# Patient Record
Sex: FEMALE | Race: NATIVE HAWAIIAN OR OTHER PACIFIC ISLANDER | NOT HISPANIC OR LATINO | Employment: OTHER | URBAN - METROPOLITAN AREA
[De-identification: names, ages, dates, MRNs, and addresses within clinical notes are randomized per-mention and may not be internally consistent; named-entity substitution may affect disease eponyms.]

---

## 2017-02-10 ENCOUNTER — OFFICE VISIT (OUTPATIENT)
Dept: MEDICAL GROUP | Facility: MEDICAL CENTER | Age: 57
End: 2017-02-10
Payer: COMMERCIAL

## 2017-02-10 VITALS
HEIGHT: 62 IN | HEART RATE: 80 BPM | RESPIRATION RATE: 12 BRPM | WEIGHT: 144 LBS | SYSTOLIC BLOOD PRESSURE: 112 MMHG | OXYGEN SATURATION: 97 % | BODY MASS INDEX: 26.5 KG/M2 | DIASTOLIC BLOOD PRESSURE: 80 MMHG | TEMPERATURE: 97.9 F

## 2017-02-10 DIAGNOSIS — J45.21 MILD INTERMITTENT ASTHMA WITH ACUTE EXACERBATION: ICD-10-CM

## 2017-02-10 DIAGNOSIS — J20.9 ACUTE BRONCHITIS, UNSPECIFIED ORGANISM: ICD-10-CM

## 2017-02-10 PROCEDURE — 99214 OFFICE O/P EST MOD 30 MIN: CPT | Performed by: NURSE PRACTITIONER

## 2017-02-10 RX ORDER — PREDNISONE 20 MG/1
20 TABLET ORAL 2 TIMES DAILY
Qty: 10 TAB | Refills: 0 | Status: SHIPPED | OUTPATIENT
Start: 2017-02-10 | End: 2018-09-11

## 2017-02-10 RX ORDER — AZITHROMYCIN 250 MG/1
TABLET, FILM COATED ORAL
Qty: 6 TAB | Refills: 0 | Status: SHIPPED | OUTPATIENT
Start: 2017-02-10 | End: 2018-01-07

## 2017-02-10 NOTE — PROGRESS NOTES
"Chief Complaint   Patient presents with   • Cough     X 2 weeks/ started fever, having congestion and mucus       HPI  57-year-old established female here with complaint of cough, congestion and not feeling well for the past 2 weeks. She feels her symptoms are worsening. She denies associated sore throat, ear pain but has occasional headache. Denies nausea, vomiting or diarrhea. She does have asthma and she is using her Symbicort 2 puffs twice daily but not her albuterol. She does not smoke. She did have to miss work yesterday because she did not feel well. 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 She is not taking any medications over-the-counter.      Past medical, surgical, family, and social history is reviewed and updated in Epic chart by me today.   Medications and allergies reviewed and updated in Epic chart by me today.     ROS:   As documented in history of present illness above    Exam:  Blood pressure 112/80, pulse 80, temperature 36.6 °C (97.9 °F), resp. rate 12, height 1.575 m (5' 2\"), weight 65.318 kg (144 lb), SpO2 97 %.  Constitutional: Alert, no distress, plus 3 vital signs  Skin:  Warm, dry, no rashes invisible areas  Eye: Equal, round and reactive, conjunctiva clear  ENMT: Bilateral tympanic membranes are translucent without erythema. Slight right-sided maxillary sinus tenderness. Oropharynx is slightly injected without exudate. No tonsillar enlargement..    Neck: Mild anterior cervical, nontender lymphadenopathy  Respiratory: Unlabored respiration, lungs clear to auscultation with expiratory wheezing to her right upper lobe.  Cardiovascular: Normal rate and rhythm, no murmur, no edema  Psych: Alert, pleasant, well-groomed, normal affect      A/P:  1. Acute bronchitis, unspecified organism  -Reminded her of the importance of using albuterol as needed for shortness of breath or wheezing. Start prednisone and a Z-Kannan. Follow up here within 5 days if not improving, sooner with " worsening. Reviewed these of the emergency medical system over the weekend with acute onset shortness of breath or difficulty breathing.  - predniSONE (DELTASONE) 20 MG Tab; Take 1 Tab by mouth 2 times a day. Take with breakfast and lunch  Dispense: 10 Tab; Refill: 0  - azithromycin (ZITHROMAX) 250 MG Tab; 500 mg day one and 250 mg day 2-5  Dispense: 6 Tab; Refill: 0    2. Mild intermittent asthma with acute exacerbation  -Discussed potential side effects of prednisone with the patient and she verbalized understanding.  - predniSONE (DELTASONE) 20 MG Tab; Take 1 Tab by mouth 2 times a day. Take with breakfast and lunch  Dispense: 10 Tab; Refill: 0

## 2017-02-10 NOTE — Clinical Note
February 10, 2017       Patient: Dayan Sanchez   YOB: 1960   Date of Visit: 2/10/2017         To Whom It May Concern:    It is my medical opinion that Dayan Sanchez be excused from work 2/9/2017 due to illness.    If you have any questions or concerns, please don't hesitate to call [phone]          Sincerely,          FIDEL Mcdaniels.  Electronically Signed

## 2017-02-10 NOTE — MR AVS SNAPSHOT
"Dayan Sanchez   2/10/2017 11:00 AM   Office Visit   MRN: [id number]    Department:  [address]   Dept Phone:  [phone]    Description:  Female : 1960   Provider:  NATALIE Mcdaniels           Reason for Visit     Cough X 2 weeks/ started fever, having congestion and mucus      Allergies as of 2/10/2017     No Known Allergies      You were diagnosed with     Acute bronchitis, unspecified organism   [0844831]       Mild intermittent asthma with acute exacerbation   [446638]         Vital Signs     Blood Pressure Pulse Temperature Respirations Height Weight    112/80 mmHg 80 36.6 °C (97.9 °F) 12 1.575 m (5' 2\") 65.318 kg (144 lb)    Body Mass Index Oxygen Saturation Smoking Status             26.33 kg/m2 97% Never Smoker          Basic Information     Date Of Birth Sex Race Ethnicity Preferred Language    1960 Female Other Non- English      Problem List              ICD-10-CM Priority Class Noted - Resolved    Hypertension I10   2011 - Present    Hepatitis B carrier (CMS-HCC) B18.1   2011 - Present    Environmental allergies Z91.09   7/3/2013 - Present    Shoulder pain, left M25.512   2015 - Present    Right foot pain M79.671   2015 - Present    Family history of diabetes mellitus (DM) Z83.3   2016 - Present    Abnormal CXR R93.8   2016 - Present    Abnormal thyroid stimulating hormone (TSH) level R79.89   2016 - Present    Abnormal CT scan of lung R91.8   2016 - Present      Health Maintenance        Date Due Completion Dates    IMM DTaP/Tdap/Td Vaccine (1 - Tdap) 1979 ---    MAMMOGRAM 10/19/2017 10/19/2016, 2015, 2013, 2012, 2011, 2010, 2010, 2008, 2008, 2007, 2007, 11/3/2006, 11/3/2006, 2005, 2004    PAP SMEAR 2018, 2012    COLONOSCOPY 2021 (Prv Comp)    Override on 2011: Previously completed            Current Immunizations    " Influenza TIV (IM) 10/1/2015    Influenza Vaccine Quad Inj (Pf) 10/1/2016      Below and/or attached are the medications your provider expects you to take. Review all of your home medications and newly ordered medications with your provider and/or pharmacist. Follow medication instructions as directed by your provider and/or pharmacist. Please keep your medication list with you and share with your provider. Update the information when medications are discontinued, doses are changed, or new medications (including over-the-counter products) are added; and carry medication information at all times in the event of emergency situations     Allergies:  No Known Allergies          Medications  Valid as of: February 10, 2017 - 11:22 AM    Generic Name Brand Name Tablet Size Instructions for use    Albuterol Sulfate (Aero Soln) albuterol 108 (90 BASE) MCG/ACT Inhale 2 Puffs by mouth every 6 hours as needed for Shortness of Breath.        Aspirin (Chew Tab) ASA 81 MG Take 1 Tab by mouth every day.        Azithromycin (Tab) ZITHROMAX 250 MG Take as directed on package. Dispense one package.        Azithromycin (Tab) ZITHROMAX 250 MG Take as directed        Azithromycin (Tab) ZITHROMAX 250  mg day one and 250 mg day 2-5        Budesonide-Formoterol Fumarate (Aerosol) SYMBICORT 160-4.5 MCG/ACT Inhale 2 Puffs by mouth 2 Times a Day. Rinse mouth after each use        Calcium Carbonate Antacid (Chew Tab) Calcium Carbonate Antacid 1000 MG Take 1 Tab by mouth every day.        Cetirizine HCl (Tab) ZYRTEC 10 MG Take 10 mg by mouth every day.        Cholecalciferol (Tab) vitamin D 2000 UNIT Take 2 Tabs by mouth every day.        Fluticasone Propionate (Suspension) FLONASE 50 MCG/ACT Spray 1 Spray in nose 2 times a day.        Hydrocod Polst-Chlorphen Polst (Liquid CR) TUSSIONEX 10-8 MG/5ML Take 5 mL by mouth every 12 hours.        Ibuprofen (Tab) MOTRIN 800 MG Take 1 Tab by mouth every 8 hours as needed for Mild Pain.         Loratadine (Tab) CLARITIN 10 MG Take 10 mg by mouth every day.        Losartan Potassium (Tab) COZAAR 50 MG TAKE 1/2 TABLET BY MOUTH IN THE MORNING THEN TAKE 1 TABLET IN THE EVENING        Montelukast Sodium (Tab) SINGULAIR 10 MG Take 10 mg by mouth every day.        Multiple Vitamin (Cap) multivitamin  Take 1 Cap by mouth every day.        Nystatin (Powder) MYCOSTATIN 784578 UNIT/GM Apply to affected areas twice daily until resolved.        Omega-3 Fatty Acids (Cap) fish oil 1000 MG Take 1 Cap by mouth 3 times a day, with meals.        Omeprazole (CAPSULE DELAYED RELEASE) PRILOSEC 20 MG Take 1 Cap by mouth every day.        Pramoxine-Menthol   by Apply externally route.        PredniSONE (Tab) DELTASONE 20 MG Take 1 Tab by mouth 2 times a day. Take with breakfast and lunch        Promethazine-Codeine (Syrup) PHENERGAN-CODEINE 6.25-10 MG/5ML Take 5 mL by mouth every 12 hours as needed for Cough.        .                 Medicines prescribed today were sent to:     [facility]ACY #[id number] - [place] - [address] [address]    Phone: [phone] Fax: [phone]    Open 24 Hours?: No      Medication refill instructions:       If your prescription bottle indicates you have medication refills left, it is not necessary to call your provider’s office. Please contact your pharmacy and they will refill your medication.    If your prescription bottle indicates you do not have any refills left, you may request refills at any time through one of the following ways: The online [employer] system (except Urgent Care), by calling your provider’s office, or by asking your pharmacy to contact your provider’s office with a refill request. Medication refills are processed only during regular business hours and may not be available until the next business day. Your provider may request additional information or to have a follow-up visit with you prior to refilling your medication.   *Please Note: Medication refills are  assigned a new Rx number when refilled electronically. Your pharmacy may indicate that no refills were authorized even though a new prescription for the same medication is available at the pharmacy. Please request the medicine by name with the pharmacy before contacting your provider for a refill.           MyChart Access Code: Activation code not generated  Current [employer] Status: Active

## 2017-07-07 RX ORDER — LOSARTAN POTASSIUM 50 MG/1
TABLET ORAL
Qty: 135 TAB | Refills: 1 | Status: SHIPPED | OUTPATIENT
Start: 2017-07-07 | End: 2018-02-01 | Stop reason: SDUPTHER

## 2017-07-07 NOTE — TELEPHONE ENCOUNTER
Was the patient seen in the last year in this department? Yes     Does patient have an active prescription for medications requested? No     Received Request Via: Pharmacy     Last visit:2/10/17

## 2017-08-03 RX ORDER — BUDESONIDE AND FORMOTEROL FUMARATE DIHYDRATE 160; 4.5 UG/1; UG/1
AEROSOL RESPIRATORY (INHALATION)
Qty: 10.2 INHALER | Refills: 2 | OUTPATIENT
Start: 2017-08-03

## 2017-08-15 ENCOUNTER — OFFICE VISIT (OUTPATIENT)
Dept: MEDICAL GROUP | Facility: PHYSICIAN GROUP | Age: 57
End: 2017-08-15
Payer: COMMERCIAL

## 2017-08-15 VITALS
OXYGEN SATURATION: 96 % | HEIGHT: 62 IN | BODY MASS INDEX: 27.97 KG/M2 | SYSTOLIC BLOOD PRESSURE: 122 MMHG | HEART RATE: 89 BPM | DIASTOLIC BLOOD PRESSURE: 66 MMHG | RESPIRATION RATE: 16 BRPM | WEIGHT: 152 LBS | TEMPERATURE: 97.9 F

## 2017-08-15 DIAGNOSIS — B18.1 HEPATITIS B CARRIER (HCC): ICD-10-CM

## 2017-08-15 DIAGNOSIS — R91.1 PULMONARY NODULE: ICD-10-CM

## 2017-08-15 DIAGNOSIS — J47.9 BRONCHIECTASIS WITHOUT COMPLICATION (HCC): ICD-10-CM

## 2017-08-15 DIAGNOSIS — J45.20 MILD INTERMITTENT ASTHMA WITHOUT COMPLICATION: ICD-10-CM

## 2017-08-15 DIAGNOSIS — I10 ESSENTIAL HYPERTENSION: ICD-10-CM

## 2017-08-15 DIAGNOSIS — Z13.220 SCREENING, LIPID: ICD-10-CM

## 2017-08-15 DIAGNOSIS — Z13.1 SCREENING FOR DIABETES MELLITUS (DM): ICD-10-CM

## 2017-08-15 PROCEDURE — 99214 OFFICE O/P EST MOD 30 MIN: CPT | Performed by: FAMILY MEDICINE

## 2017-08-15 RX ORDER — ALBUTEROL SULFATE 90 UG/1
2 AEROSOL, METERED RESPIRATORY (INHALATION) EVERY 6 HOURS PRN
Qty: 8.5 G | Refills: 1 | Status: SHIPPED | OUTPATIENT
Start: 2017-08-15 | End: 2018-09-11

## 2017-08-15 RX ORDER — BUDESONIDE AND FORMOTEROL FUMARATE DIHYDRATE 160; 4.5 UG/1; UG/1
2 AEROSOL RESPIRATORY (INHALATION) 2 TIMES DAILY
Qty: 1 INHALER | Refills: 5 | Status: SHIPPED | OUTPATIENT
Start: 2017-08-15 | End: 2020-08-05

## 2017-08-15 ASSESSMENT — PATIENT HEALTH QUESTIONNAIRE - PHQ9: CLINICAL INTERPRETATION OF PHQ2 SCORE: 0

## 2017-08-15 ASSESSMENT — PAIN SCALES - GENERAL: PAINLEVEL: NO PAIN

## 2017-08-15 NOTE — MR AVS SNAPSHOT
"        Dayan Sanchez   8/15/2017 3:40 PM   Office Visit   MRN: [id number]    Department:  [facility]   Dept Phone:  [phone]    Description:  Female : 1960   Provider:  Noni Jansen M.D.           Reason for Visit     Establish Care Dr. Mccrary    Medication Refill           Allergies as of 8/15/2017     Allergen Noted Reactions    Seasonal 08/15/2017         You were diagnosed with     Essential hypertension   [1349524]       Hepatitis B carrier   [V02.61.ICD-9-CM]       Bronchiectasis without complication (CMS-HCC)   [187205]       Pulmonary nodule   [439190]       Mild intermittent asthma without complication   [284547]       Screening, lipid   [275470]       Screening for diabetes mellitus (DM)   [789550]         Vital Signs     Blood Pressure Pulse Temperature Respirations Height Weight    122/66 mmHg 89 36.6 °C (97.9 °F) 16 1.575 m (5' 2.01\") 68.947 kg (152 lb)    Body Mass Index Oxygen Saturation Breastfeeding? Smoking Status          27.79 kg/m2 96% No Never Smoker         Basic Information     Date Of Birth Sex Race Ethnicity Preferred Language    1960 Female Other Non- English      Your appointments     Sep 20, 2017  3:00 PM   Established Patient with Noni Jansen M.D.   [facility] (--)    [address]  [address]  [facility] [address] [phone]           You will be receiving a confirmation call a few days before your appointment from our automated call confirmation system.            Sep 21, 2017  2:40 PM   Established Patient Pul with A Rotation   [facility] (--)    [address] [place] [address] [address]  [place] [id number] [phone]              Problem List              ICD-10-CM Priority Class Noted - Resolved    Hypertension I10   2011 - Present    Hepatitis B carrier B18.1   2011 - Present    Environmental allergies Z91.09   7/3/2013 - Present    Shoulder pain, left M25.512   2015 - Present    Right foot pain " M79.671   11/13/2015 - Present    Family history of diabetes mellitus (DM) Z83.3   1/12/2016 - Present    Abnormal CXR R93.8   1/12/2016 - Present    Abnormal thyroid stimulating hormone (TSH) level R79.89   1/29/2016 - Present    Abnormal CT scan of lung R91.8   1/29/2016 - Present      Health Maintenance        Date Due Completion Dates    IMM DTaP/Tdap/Td Vaccine (1 - Tdap) 1/9/1979 ---    IMM INFLUENZA (1) 9/1/2017 10/1/2016, 10/1/2015    MAMMOGRAM 10/19/2017 10/19/2016, 5/27/2015, 2/28/2013, 2/27/2012, 2/8/2011, 1/26/2010, 1/26/2010, 12/23/2008, 12/23/2008, 11/5/2007, 11/5/2007, 11/3/2006, 11/3/2006, 9/19/2005, 9/16/2004    PAP SMEAR 5/4/2018 5/4/2015, 6/19/2012    COLONOSCOPY 1/1/2021 1/1/2011 (Prv Comp)    Override on 1/1/2011: Previously completed            Current Immunizations     Influenza TIV (IM) 10/1/2015    Influenza Vaccine Quad Inj (Pf) 10/1/2016      Below and/or attached are the medications your provider expects you to take. Review all of your home medications and newly ordered medications with your provider and/or pharmacist. Follow medication instructions as directed by your provider and/or pharmacist. Please keep your medication list with you and share with your provider. Update the information when medications are discontinued, doses are changed, or new medications (including over-the-counter products) are added; and carry medication information at all times in the event of emergency situations     Allergies:  SEASONAL - (reactions not documented)               Medications  Valid as of: August 15, 2017 -  4:32 PM    Generic Name Brand Name Tablet Size Instructions for use    Albuterol Sulfate (Aero Soln) albuterol 108 (90 BASE) MCG/ACT Inhale 2 Puffs by mouth every 6 hours as needed for Shortness of Breath.        Aspirin (Chew Tab) ASA 81 MG Take 1 Tab by mouth every day.        Azithromycin (Tab) ZITHROMAX 250 MG Take as directed on package. Dispense one package.        Azithromycin (Tab)  ZITHROMAX 250 MG Take as directed        Azithromycin (Tab) ZITHROMAX 250  mg day one and 250 mg day 2-5        Budesonide-Formoterol Fumarate (Aerosol) SYMBICORT 160-4.5 MCG/ACT Inhale 2 Puffs by mouth 2 Times a Day. Rinse mouth after each use        Calcium Carbonate Antacid (Chew Tab) Calcium Carbonate Antacid 1000 MG Take 1 Tab by mouth every day.        Cetirizine HCl (Tab) ZYRTEC 10 MG Take 10 mg by mouth every day.        Cholecalciferol (Tab) vitamin D 2000 UNIT Take 2 Tabs by mouth every day.        Fluticasone Propionate (Suspension) FLONASE 50 MCG/ACT Spray 1 Spray in nose 2 times a day.        Hydrocod Polst-Chlorphen Polst (Liquid CR) TUSSIONEX 10-8 MG/5ML Take 5 mL by mouth every 12 hours.        Ibuprofen (Tab) MOTRIN 800 MG Take 1 Tab by mouth every 8 hours as needed for Mild Pain.        Loratadine (Tab) CLARITIN 10 MG Take 10 mg by mouth every day.        Losartan Potassium (Tab) COZAAR 50 MG TAKE 1/2 TABLET BY MOUTH IN THE MORNING THEN TAKE 1 TABLET IN THE EVENING        Montelukast Sodium (Tab) SINGULAIR 10 MG Take 10 mg by mouth every day.        Multiple Vitamin (Cap) multivitamin  Take 1 Cap by mouth every day.        Nystatin (Powder) MYCOSTATIN 952025 UNIT/GM Apply to affected areas twice daily until resolved.        Omega-3 Fatty Acids (Cap) fish oil 1000 MG Take 1 Cap by mouth 3 times a day, with meals.        Omeprazole (CAPSULE DELAYED RELEASE) PRILOSEC 20 MG Take 1 Cap by mouth every day.        Pramoxine-Menthol   by Apply externally route.        PredniSONE (Tab) DELTASONE 20 MG Take 1 Tab by mouth 2 times a day. Take with breakfast and lunch        Promethazine-Codeine (Syrup) PHENERGAN-CODEINE 6.25-10 MG/5ML Take 5 mL by mouth every 12 hours as needed for Cough.        .                 Medicines prescribed today were sent to:     [facility]/PHARMACY #[id number] - [place] - [address] [address]    Phone: [phone] Fax: [phone]    Open 24 Hours?: No         Medication refill instructions:       If your prescription bottle indicates you have medication refills left, it is not necessary to call your provider’s office. Please contact your pharmacy and they will refill your medication.    If your prescription bottle indicates you do not have any refills left, you may request refills at any time through one of the following ways: The online [employer] system (except Urgent Care), by calling your provider’s office, or by asking your pharmacy to contact your provider’s office with a refill request. Medication refills are processed only during regular business hours and may not be available until the next business day. Your provider may request additional information or to have a follow-up visit with you prior to refilling your medication.   *Please Note: Medication refills are assigned a new Rx number when refilled electronically. Your pharmacy may indicate that no refills were authorized even though a new prescription for the same medication is available at the pharmacy. Please request the medicine by name with the pharmacy before contacting your provider for a refill.        Your To Do List     Future Labs/Procedures Complete By Expires    CBC WITH DIFFERENTIAL  As directed 8/16/2018    COMP METABOLIC PANEL  As directed 8/16/2018    LIPID PROFILE  As directed 8/16/2018    TSH  As directed 8/16/2018         [employer] Access Code: Activation code not generated  Current [employer] Status: Active

## 2017-08-18 NOTE — PROGRESS NOTES
Subjective:      Dayan Sanchez is a 57 y.o. female who presents with Establish Care and Medication Refill            HPI     This is a 57-year-old [place] female who is here as a new patient to get established. She was previously under the care of Dr. Epps.    She has history of hypertension for which she takes losartan with good control of her blood pressure.    She is also diagnosed as a hepatitis B carrier. She is followed closely by the gastroenterologist. She is not on medication for this with yearly abdominal ultrasound and AFP tumor marker to make sure she does not develop liver cancer. Her gastrin try ologist Dr. Ledbetter has left the practice so she has to establish with another GI specialist. She denies any problems with nausea, vomiting, abdominal pain.    She had tuberculosis about 30 years ago which was treated. She had a CAT scan done in January 2016 that showed bronchiectasis and 6 mm right upper lobe nodule with spiculations and calcifications probably postinflammatory but requires follow-up in one year. She did not do a CAT scan in January of this year. She has an appointment with the pulmonary clinic in September and she said she will wait until then to get the order. She denies any cough or shortness of breath. She does have history of mild intermittent asthma which is followed by her allergist Dr. Haines. She uses Symbicort inhaler with good results.    I reviewed the following    Past Medical History   Diagnosis Date   • Hypertension    • Hepatitis B carrier    • Positive TB test      treated 1990's   • History of pneumothorax    • Environmental allergies 7/3/2013   • Bronchiectasis (CMS-HCC)    • Bronchitis    • Pneumonia    • Tuberculosis exposure    • Tuberculosis         Past Surgical History   Procedure Laterality Date   • Liver biopsy     • Tubal ligation     • Cataract extraction with iol         Allergies   Allergen Reactions   • Seasonal        Current Outpatient Prescriptions    Medication Sig Dispense Refill   • budesonide-formoterol (SYMBICORT) 160-4.5 MCG/ACT Aerosol Inhale 2 Puffs by mouth 2 Times a Day. Rinse mouth after each use 1 Inhaler 5   • albuterol 108 (90 BASE) MCG/ACT Aero Soln inhalation aerosol Inhale 2 Puffs by mouth every 6 hours as needed for Shortness of Breath. 8.5 g 1   • Multiple Vitamin (MULTIVITAMIN) capsule Take 1 Cap by mouth every day. 100 Cap 0   • Cholecalciferol (VITAMIN D) 2000 UNIT TABS Take 2 Tabs by mouth every day. 30 Tab 0   • Calcium Carbonate Antacid (CALCIUM ANTACID ULTRA) 1000 MG CHEW Take 1 Tab by mouth every day. 60 Tab 0   • Omega-3 Fatty Acids (FISH OIL) 1000 MG CAPS capsule Take 1 Cap by mouth 3 times a day, with meals. 90 Cap 0   • losartan (COZAAR) 50 MG Tab TAKE 1/2 TABLET BY MOUTH IN THE MORNING THEN TAKE 1 TABLET IN THE EVENING 135 Tab 1   • predniSONE (DELTASONE) 20 MG Tab Take 1 Tab by mouth 2 times a day. Take with breakfast and lunch 10 Tab 0   • azithromycin (ZITHROMAX) 250 MG Tab 500 mg day one and 250 mg day 2-5 6 Tab 0   • azithromycin (ZITHROMAX) 250 MG Tab Take as directed (Patient not taking: Reported on 12/22/2016) 6 Tab 0   • azithromycin (ZITHROMAX) 250 MG Tab Take as directed on package. Dispense one package. (Patient not taking: Reported on 12/22/2016) 6 Tab 0   • promethazine-codeine (PHENERGAN-CODEINE) 6.25-10 MG/5ML Syrup Take 5 mL by mouth every 12 hours as needed for Cough. (Patient not taking: Reported on 12/22/2016) 120 mL 0   • cetirizine (ZYRTEC) 10 MG Tab Take 10 mg by mouth every day.     • montelukast (SINGULAIR) 10 MG Tab Take 10 mg by mouth every day.     • Pramoxine-Menthol (GOLD BOND MEDICATED ANTI ITCH EX) by Apply externally route.     • nystatin (MYCOSTATIN) powder Apply to affected areas twice daily until resolved. (Patient not taking: Reported on 12/22/2016) 15 g 1   • omeprazole (PRILOSEC) 20 MG delayed-release capsule Take 1 Cap by mouth every day. 30 Cap 3   • fluticasone (FLONASE) 50 MCG/ACT nasal  "spray Spray 1 Spray in nose 2 times a day. (Patient not taking: Reported on 12/22/2016) 1 Bottle 3   • hydrocod polst-chlorphen polst (TUSSIONEX) 10-8 MG/5ML Liquid CR Take 5 mL by mouth every 12 hours. 120 mL 0   • aspirin (ASA) 81 MG CHEW chewable tablet Take 1 Tab by mouth every day. 100 Tab 11   • ibuprofen (MOTRIN) 800 MG TABS Take 1 Tab by mouth every 8 hours as needed for Mild Pain. 90 Tab 3   • loratadine (CLARITIN) 10 MG TABS Take 10 mg by mouth every day.       No current facility-administered medications for this visit.        Family History   Problem Relation Age of Onset   • Heart Disease Mother    • Diabetes Mother    • Heart Disease Father    • Hypertension Sister    • Other Sister      1 sister - kidney transplant   • Diabetes Sister    • Diabetes Brother      1 brother       Social History     Social History   • Marital Status:      Spouse Name: N/A   • Number of Children: 3   • Years of Education: N/A     Occupational History   • nutrition services      Social History Main Topics   • Smoking status: Never Smoker    • Smokeless tobacco: Never Used   • Alcohol Use: 0.0 oz/week     0 Standard drinks or equivalent per week      Comment: once a month   • Drug Use: No   • Sexual Activity:     Partners: Male     Other Topics Concern   • Not on file     Social History Narrative        ROS     Review of systems as per history of present illness, dresser negative       Objective:     /66 mmHg  Pulse 89  Temp(Src) 36.6 °C (97.9 °F)  Resp 16  Ht 1.575 m (5' 2.01\")  Wt 68.947 kg (152 lb)  BMI 27.79 kg/m2  SpO2 96%  Breastfeeding? No     Physical Exam     Examined alert, awake, oriented, not in distress    Neck-supple, no lymphadenopathy, no thyromegaly  Lungs-clear to auscultation, no rales, no wheezes  Heart-regular rate and rhythm, no murmur  Extremities-no edema, clubbing, cyanosis         Assessment/Plan:     1. Essential hypertension  Controlled on her medication.  - LIPID PROFILE; " Future  - COMP METABOLIC PANEL; Future  - CBC WITH DIFFERENTIAL; Future  - TSH; Future    2. Hepatitis B carrier  Followed closely by GI specialist. She needs to establish with another gastroenterologist because her previous GI specialist has left the practice. She needs yearly ultrasound and AFP tumor marker. So far these have been unremarkable and normal respectively.  - LIPID PROFILE; Future  - COMP METABOLIC PANEL; Future  - CBC WITH DIFFERENTIAL; Future  - TSH; Future    3. Bronchiectasis without complication (CMS-HCC)  Currently asymptomatic. Keep appointment with pulmonologist.  - LIPID PROFILE; Future  - COMP METABOLIC PANEL; Future  - CBC WITH DIFFERENTIAL; Future  - TSH; Future    4. Pulmonary nodule  She needs follow-up CT scan. She said she will wait until seen by the pulmonary clinic in September to get that ordered.  - LIPID PROFILE; Future  - COMP METABOLIC PANEL; Future  - CBC WITH DIFFERENTIAL; Future  - TSH; Future    5. Mild intermittent asthma without complication  Stable on Symbicort.  - budesonide-formoterol (SYMBICORT) 160-4.5 MCG/ACT Aerosol; Inhale 2 Puffs by mouth 2 Times a Day. Rinse mouth after each use  Dispense: 1 Inhaler; Refill: 5  - albuterol 108 (90 BASE) MCG/ACT Aero Soln inhalation aerosol; Inhale 2 Puffs by mouth every 6 hours as needed for Shortness of Breath.  Dispense: 8.5 g; Refill: 1    6. Screening, lipid  We will do screening labs.  - LIPID PROFILE; Future  - COMP METABOLIC PANEL; Future  - CBC WITH DIFFERENTIAL; Future  - TSH; Future    7. Screening for diabetes mellitus (DM)  We will do screening labs.  - LIPID PROFILE; Future  - COMP METABOLIC PANEL; Future  - CBC WITH DIFFERENTIAL; Future  - TSH; Future  She will return in one month for GYN exam/Pap smear.    Please note that this dictation was created using voice recognition software. I have worked with consultants from the vendor as well as technical experts from [employer] to optimize the interface. I have made  every reasonable attempt to correct obvious errors, but I expect that there are errors of grammar and possibly content I did not discover before finalizing the note.

## 2017-09-16 ENCOUNTER — HOSPITAL ENCOUNTER (OUTPATIENT)
Dept: LAB | Facility: MEDICAL CENTER | Age: 57
End: 2017-09-16
Attending: FAMILY MEDICINE
Payer: COMMERCIAL

## 2017-09-16 DIAGNOSIS — Z13.220 SCREENING, LIPID: ICD-10-CM

## 2017-09-16 DIAGNOSIS — Z13.1 SCREENING FOR DIABETES MELLITUS (DM): ICD-10-CM

## 2017-09-16 DIAGNOSIS — R91.1 PULMONARY NODULE: ICD-10-CM

## 2017-09-16 DIAGNOSIS — B18.1 HEPATITIS B CARRIER (HCC): ICD-10-CM

## 2017-09-16 DIAGNOSIS — J47.9 BRONCHIECTASIS WITHOUT COMPLICATION (HCC): ICD-10-CM

## 2017-09-16 DIAGNOSIS — I10 ESSENTIAL HYPERTENSION: ICD-10-CM

## 2017-09-16 LAB
ALBUMIN SERPL BCP-MCNC: 4.2 G/DL (ref 3.2–4.9)
ALBUMIN/GLOB SERPL: 1.3 G/DL
ALP SERPL-CCNC: 63 U/L (ref 30–99)
ALT SERPL-CCNC: 22 U/L (ref 2–50)
ANION GAP SERPL CALC-SCNC: 8 MMOL/L (ref 0–11.9)
AST SERPL-CCNC: 22 U/L (ref 12–45)
BASOPHILS # BLD AUTO: 1.2 % (ref 0–1.8)
BASOPHILS # BLD: 0.07 K/UL (ref 0–0.12)
BILIRUB SERPL-MCNC: 0.6 MG/DL (ref 0.1–1.5)
BUN SERPL-MCNC: 11 MG/DL (ref 8–22)
CALCIUM SERPL-MCNC: 9.2 MG/DL (ref 8.5–10.5)
CHLORIDE SERPL-SCNC: 107 MMOL/L (ref 96–112)
CHOLEST SERPL-MCNC: 179 MG/DL (ref 100–199)
CO2 SERPL-SCNC: 26 MMOL/L (ref 20–33)
CREAT SERPL-MCNC: 0.64 MG/DL (ref 0.5–1.4)
EOSINOPHIL # BLD AUTO: 0.43 K/UL (ref 0–0.51)
EOSINOPHIL NFR BLD: 7.6 % (ref 0–6.9)
ERYTHROCYTE [DISTWIDTH] IN BLOOD BY AUTOMATED COUNT: 38.3 FL (ref 35.9–50)
GFR SERPL CREATININE-BSD FRML MDRD: >60 ML/MIN/1.73 M 2
GLOBULIN SER CALC-MCNC: 3.3 G/DL (ref 1.9–3.5)
GLUCOSE SERPL-MCNC: 86 MG/DL (ref 65–99)
HCT VFR BLD AUTO: 43 % (ref 37–47)
HDLC SERPL-MCNC: 70 MG/DL
HGB BLD-MCNC: 14.3 G/DL (ref 12–16)
IMM GRANULOCYTES # BLD AUTO: 0 K/UL (ref 0–0.11)
IMM GRANULOCYTES NFR BLD AUTO: 0 % (ref 0–0.9)
LDLC SERPL CALC-MCNC: 84 MG/DL
LYMPHOCYTES # BLD AUTO: 2.24 K/UL (ref 1–4.8)
LYMPHOCYTES NFR BLD: 39.6 % (ref 22–41)
MCH RBC QN AUTO: 29.7 PG (ref 27–33)
MCHC RBC AUTO-ENTMCNC: 33.3 G/DL (ref 33.6–35)
MCV RBC AUTO: 89.2 FL (ref 81.4–97.8)
MONOCYTES # BLD AUTO: 0.4 K/UL (ref 0–0.85)
MONOCYTES NFR BLD AUTO: 7.1 % (ref 0–13.4)
NEUTROPHILS # BLD AUTO: 2.51 K/UL (ref 2–7.15)
NEUTROPHILS NFR BLD: 44.5 % (ref 44–72)
NRBC # BLD AUTO: 0 K/UL
NRBC BLD AUTO-RTO: 0 /100 WBC
PLATELET # BLD AUTO: 299 K/UL (ref 164–446)
PMV BLD AUTO: 9.7 FL (ref 9–12.9)
POTASSIUM SERPL-SCNC: 4.1 MMOL/L (ref 3.6–5.5)
PROT SERPL-MCNC: 7.5 G/DL (ref 6–8.2)
RBC # BLD AUTO: 4.82 M/UL (ref 4.2–5.4)
SODIUM SERPL-SCNC: 141 MMOL/L (ref 135–145)
TRIGL SERPL-MCNC: 126 MG/DL (ref 0–149)
TSH SERPL DL<=0.005 MIU/L-ACNC: 0.36 UIU/ML (ref 0.3–3.7)
WBC # BLD AUTO: 5.7 K/UL (ref 4.8–10.8)

## 2017-09-16 PROCEDURE — 80061 LIPID PANEL: CPT

## 2017-09-16 PROCEDURE — 36415 COLL VENOUS BLD VENIPUNCTURE: CPT

## 2017-09-16 PROCEDURE — 80053 COMPREHEN METABOLIC PANEL: CPT

## 2017-09-16 PROCEDURE — 85025 COMPLETE CBC W/AUTO DIFF WBC: CPT

## 2017-09-16 PROCEDURE — 84443 ASSAY THYROID STIM HORMONE: CPT

## 2017-09-20 ENCOUNTER — OFFICE VISIT (OUTPATIENT)
Dept: MEDICAL GROUP | Facility: PHYSICIAN GROUP | Age: 57
End: 2017-09-20
Payer: COMMERCIAL

## 2017-09-20 ENCOUNTER — HOSPITAL ENCOUNTER (OUTPATIENT)
Dept: RADIOLOGY | Facility: MEDICAL CENTER | Age: 57
End: 2017-09-20
Attending: PHYSICIAN ASSISTANT
Payer: COMMERCIAL

## 2017-09-20 ENCOUNTER — HOSPITAL ENCOUNTER (OUTPATIENT)
Facility: MEDICAL CENTER | Age: 57
End: 2017-09-20
Attending: FAMILY MEDICINE
Payer: COMMERCIAL

## 2017-09-20 VITALS
OXYGEN SATURATION: 96 % | HEART RATE: 83 BPM | BODY MASS INDEX: 27.83 KG/M2 | WEIGHT: 151.24 LBS | DIASTOLIC BLOOD PRESSURE: 60 MMHG | TEMPERATURE: 97.9 F | SYSTOLIC BLOOD PRESSURE: 100 MMHG | HEIGHT: 62 IN

## 2017-09-20 DIAGNOSIS — Z01.419 ENCOUNTER FOR ROUTINE GYNECOLOGICAL EXAMINATION WITH PAPANICOLAOU SMEAR OF CERVIX: ICD-10-CM

## 2017-09-20 DIAGNOSIS — Z11.51 SCREENING FOR HUMAN PAPILLOMAVIRUS (HPV): ICD-10-CM

## 2017-09-20 DIAGNOSIS — B18.1 HEPATITIS B CARRIER (HCC): ICD-10-CM

## 2017-09-20 PROCEDURE — 99396 PREV VISIT EST AGE 40-64: CPT | Performed by: FAMILY MEDICINE

## 2017-09-20 PROCEDURE — 76705 ECHO EXAM OF ABDOMEN: CPT

## 2017-09-20 PROCEDURE — 99000 SPECIMEN HANDLING OFFICE-LAB: CPT | Performed by: FAMILY MEDICINE

## 2017-09-20 PROCEDURE — 88175 CYTOPATH C/V AUTO FLUID REDO: CPT

## 2017-09-20 NOTE — LETTER
[employer] [facility]  Noni Jansen M.D.  [address]  [place] [address]  Fax: [phone]   Authorization for Release/Disclosure of   Protected Health Information   Name: JACEY CHRISTIE : 1960 SSN: [id number]   Address: [address] [place] [address]  [place] [address] Phone:    [phone] (home)    I authorize the entity listed below to release/disclose the PHI below to:   [employer] [facility]/Noni Jansen M.D. and Noni Jansen M.D.   Provider or Entity Name:    [facility]   Address   City, State, Zip   Phone:      Fax:     Reason for request: continuity of care   Information to be released:    [ x ] LAST COLONOSCOPY,  including any PATH REPORT and follow-up  [  ] LAST FIT/COLOGUARD RESULT [  ] LAST DEXA  [  ] LAST MAMMOGRAM  [  ] LAST PAP  [  ] LAST LABS [  ] RETINA EXAM REPORT  [  ] IMMUNIZATION RECORDS  [  ] Release all info      [  ] Check here and initial the line next to each item to release ALL health information INCLUDING  _____ Care and treatment for drug and / or alcohol abuse  _____ HIV testing, infection status, or AIDS  _____ Genetic Testing    DATES OF SERVICE OR TIME PERIOD TO BE DISCLOSED: _____________  I understand and acknowledge that:  * This Authorization may be revoked at any time by you in writing, except if your health information has already been used or disclosed.  * Your health information that will be used or disclosed as a result of you signing this authorization could be re-disclosed by the recipient. If this occurs, your re-disclosed health information may no longer be protected by State or Federal laws.  * You may refuse to sign this Authorization. Your refusal will not affect your ability to obtain treatment.  * This Authorization becomes effective upon signing and will  on (date) __________.      If no date is indicated, this Authorization will  one (1) year from the signature date.    Name: Jacey Christie    Signature:   Date:     2017       PLEASE  FAX REQUESTED RECORDS BACK TO: [phone]

## 2017-09-21 ENCOUNTER — OFFICE VISIT (OUTPATIENT)
Dept: PULMONOLOGY | Facility: HOSPICE | Age: 57
End: 2017-09-21
Payer: COMMERCIAL

## 2017-09-21 VITALS
RESPIRATION RATE: 14 BRPM | TEMPERATURE: 98.3 F | DIASTOLIC BLOOD PRESSURE: 80 MMHG | WEIGHT: 150 LBS | HEIGHT: 62 IN | OXYGEN SATURATION: 94 % | BODY MASS INDEX: 27.6 KG/M2 | SYSTOLIC BLOOD PRESSURE: 118 MMHG | HEART RATE: 68 BPM

## 2017-09-21 DIAGNOSIS — Z86.11 HISTORY OF TUBERCULOSIS: ICD-10-CM

## 2017-09-21 DIAGNOSIS — J47.9 BRONCHIECTASIS WITHOUT COMPLICATION (HCC): ICD-10-CM

## 2017-09-21 DIAGNOSIS — J45.909 UNCOMPLICATED ASTHMA, UNSPECIFIED ASTHMA SEVERITY: ICD-10-CM

## 2017-09-21 LAB
CYTOLOGY REG CYTOL: NORMAL
HPV HR 12 DNA CVX QL NAA+PROBE: NEGATIVE
HPV16 DNA SPEC QL NAA+PROBE: NEGATIVE
HPV18 DNA SPEC QL NAA+PROBE: NEGATIVE
SPECIMEN SOURCE: NORMAL

## 2017-09-21 PROCEDURE — 99214 OFFICE O/P EST MOD 30 MIN: CPT | Performed by: INTERNAL MEDICINE

## 2017-09-21 RX ORDER — BUDESONIDE AND FORMOTEROL FUMARATE DIHYDRATE 160; 4.5 UG/1; UG/1
2 AEROSOL RESPIRATORY (INHALATION) 2 TIMES DAILY
Qty: 1 INHALER | Refills: 0 | Status: SHIPPED | OUTPATIENT
Start: 2017-09-21 | End: 2018-09-11

## 2017-09-21 NOTE — PATIENT INSTRUCTIONS
She will continue Symbicort and albuterol  We will give her a sample for Spiriva and if it helps she can fill a prescription for Spiriva  We will see her back in 6 months after a repeat CT scan for her bronchiectasis/nodules  She will continue to see  for her allergies, asthma, and immunotherapy

## 2017-09-21 NOTE — PROGRESS NOTES
Subjective:      Dayan Sanchez is a 57 y.o. female who presents with Gynecologic Exam            Gynecologic Exam         Patient is here for routine GYN exam/Pap smear. Her last Pap smear was in May 2015 that came back normal. No history of abnormal Pap smears. No history of STDs. She is  4 para 3, one miscarriage. Sband apostle. Her last mammogram was in 2016 which was within normal limits. Her last colonoscopy was in . She said she went to the health fair last weekend on 17 and she got tetanus shot, pneumonia shot and flu shot. She does not have documentation with her.    I reviewed the following    Past Medical History:   Diagnosis Date   • Environmental allergies 7/3/2013   • Bronchiectasis (CMS-HCC)    • Bronchitis    • Hepatitis B carrier    • History of pneumothorax    • Hypertension    • Pneumonia    • Positive TB test     treated    • Tuberculosis    • Tuberculosis exposure         Past Surgical History:   Procedure Laterality Date   • CATARACT EXTRACTION WITH IOL     • LIVER BIOPSY     • TUBAL LIGATION         Allergies   Allergen Reactions   • Seasonal        Current Outpatient Prescriptions   Medication Sig Dispense Refill   • budesonide-formoterol (SYMBICORT) 160-4.5 MCG/ACT Aerosol Inhale 2 Puffs by mouth 2 Times a Day. Rinse mouth after each use 1 Inhaler 5   • albuterol 108 (90 BASE) MCG/ACT Aero Soln inhalation aerosol Inhale 2 Puffs by mouth every 6 hours as needed for Shortness of Breath. 8.5 g 1   • losartan (COZAAR) 50 MG Tab TAKE 1/2 TABLET BY MOUTH IN THE MORNING THEN TAKE 1 TABLET IN THE EVENING 135 Tab 1   • cetirizine (ZYRTEC) 10 MG Tab Take 10 mg by mouth every day.     • nystatin (MYCOSTATIN) powder Apply to affected areas twice daily until resolved. 15 g 1   • omeprazole (PRILOSEC) 20 MG delayed-release capsule Take 1 Cap by mouth every day. 30 Cap 3   • fluticasone (FLONASE) 50 MCG/ACT nasal spray Spray 1 Spray in nose 2 times a day. 1 Bottle 3   •  Multiple Vitamin (MULTIVITAMIN) capsule Take 1 Cap by mouth every day. 100 Cap 0   • Cholecalciferol (VITAMIN D) 2000 UNIT TABS Take 2 Tabs by mouth every day. 30 Tab 0   • aspirin (ASA) 81 MG CHEW chewable tablet Take 1 Tab by mouth every day. 100 Tab 11   • Omega-3 Fatty Acids (FISH OIL) 1000 MG CAPS capsule Take 1 Cap by mouth 3 times a day, with meals. 90 Cap 0   • predniSONE (DELTASONE) 20 MG Tab Take 1 Tab by mouth 2 times a day. Take with breakfast and lunch 10 Tab 0   • azithromycin (ZITHROMAX) 250 MG Tab 500 mg day one and 250 mg day 2-5 6 Tab 0   • azithromycin (ZITHROMAX) 250 MG Tab Take as directed (Patient not taking: Reported on 12/22/2016) 6 Tab 0   • azithromycin (ZITHROMAX) 250 MG Tab Take as directed on package. Dispense one package. (Patient not taking: Reported on 12/22/2016) 6 Tab 0   • promethazine-codeine (PHENERGAN-CODEINE) 6.25-10 MG/5ML Syrup Take 5 mL by mouth every 12 hours as needed for Cough. (Patient not taking: Reported on 12/22/2016) 120 mL 0   • montelukast (SINGULAIR) 10 MG Tab Take 10 mg by mouth every day.     • Pramoxine-Menthol (GOLD BOND MEDICATED ANTI ITCH EX) by Apply externally route.     • hydrocod polst-chlorphen polst (TUSSIONEX) 10-8 MG/5ML Liquid CR Take 5 mL by mouth every 12 hours. 120 mL 0   • Calcium Carbonate Antacid (CALCIUM ANTACID ULTRA) 1000 MG CHEW Take 1 Tab by mouth every day. 60 Tab 0   • ibuprofen (MOTRIN) 800 MG TABS Take 1 Tab by mouth every 8 hours as needed for Mild Pain. 90 Tab 3   • loratadine (CLARITIN) 10 MG TABS Take 10 mg by mouth every day.       No current facility-administered medications for this visit.         Family History   Problem Relation Age of Onset   • Heart Disease Mother    • Diabetes Mother    • Heart Disease Father    • Hypertension Sister    • Other Sister      1 sister - kidney transplant   • Diabetes Sister    • Diabetes Brother      1 brother       Social History     Social History   • Marital status:      Spouse  "name: N/A   • Number of children: 3   • Years of education: N/A     Occupational History   • nutrition services      Social History Main Topics   • Smoking status: Never Smoker   • Smokeless tobacco: Never Used   • Alcohol use 0.0 oz/week      Comment: once a month   • Drug use: No   • Sexual activity: Yes     Partners: Male     Other Topics Concern   • Not on file     Social History Narrative   • No narrative on file        ROS     Review of systems as per history of present illness, the rest are negative.       Objective:     /60   Pulse 83   Temp 36.6 °C (97.9 °F)   Ht 1.575 m (5' 2\")   Wt 68.6 kg (151 lb 3.8 oz)   SpO2 96%   BMI 27.66 kg/m²      Physical Exam   Constitutional: She is oriented to person, place, and time. She appears well-developed and well-nourished. No distress.   HENT:   Head: Normocephalic and atraumatic.   Right Ear: External ear normal.   Left Ear: External ear normal.   Nose: Nose normal.   Mouth/Throat: Oropharynx is clear and moist. No oropharyngeal exudate.   Eyes: Conjunctivae and EOM are normal. Pupils are equal, round, and reactive to light. Right eye exhibits no discharge. Left eye exhibits no discharge. No scleral icterus.   Neck: Normal range of motion. Neck supple. No tracheal deviation present. No thyromegaly present.   Cardiovascular: Normal rate, regular rhythm and normal heart sounds.  Exam reveals no gallop.    No murmur heard.  Pulmonary/Chest: Effort normal and breath sounds normal. No stridor. No respiratory distress. She has no wheezes. She has no rales. Right breast exhibits no inverted nipple, no mass, no nipple discharge, no skin change and no tenderness. Left breast exhibits no inverted nipple, no mass, no nipple discharge, no skin change and no tenderness. Breasts are symmetrical.   Abdominal: Soft. Bowel sounds are normal. She exhibits no distension and no mass. There is no tenderness. There is no rebound and no guarding. Hernia confirmed negative in the " right inguinal area and confirmed negative in the left inguinal area.   Genitourinary: Vagina normal and uterus normal. Rectal exam shows no external hemorrhoid, no internal hemorrhoid, no fissure, no mass, no tenderness, anal tone normal and guaiac negative stool. No breast tenderness, discharge or bleeding. Pelvic exam was performed with patient supine. No labial fusion. There is no rash, tenderness, lesion or injury on the right labia. There is no rash, tenderness or injury on the left labia. Uterus is not deviated, not enlarged, not fixed and not tender. Cervix exhibits no motion tenderness, no discharge and no friability. Right adnexum displays no mass, no tenderness and no fullness. Left adnexum displays no mass, no tenderness and no fullness. No tenderness or bleeding in the vagina. No foreign body in the vagina. No vaginal discharge found.       Musculoskeletal: Normal range of motion. She exhibits no edema or tenderness.   Lymphadenopathy:     She has no cervical adenopathy.        Right: No inguinal adenopathy present.        Left: No inguinal adenopathy present.   Neurological: She is alert and oriented to person, place, and time. She displays normal reflexes. No cranial nerve deficit. She exhibits normal muscle tone. Coordination normal.   Skin: Skin is warm. No rash noted. She is not diaphoretic. No erythema. No pallor.   Psychiatric: She has a normal mood and affect. Her behavior is normal. Thought content normal.                    Assessment/Plan:     1. Encounter for routine gynecological examination with Papanicolaou smear of cervix  Complete exam was done. Pap smear was done. Specimen was packaged and sent to the lab. Up-to-date with immunizations. We will get records from [facility] of what she has received through the health fair this weekend. Up-to-date with mammogram and colonoscopy. I will get the colonoscopy report  - THINPREP PAP WITH HPV; Future    2. Screening for human papillomavirus  (HPV)  Screening HPV done. The cement was packaged and sent to the lab.  - THINPREP PAP WITH HPV; Future      Please note that this dictation was created using voice recognition software. I have worked with consultants from the vendor as well as technical experts from [facility] to optimize the interface. I have made every reasonable attempt to correct obvious errors, but I expect that there are errors of grammar and possibly content I did not discover before finalizing the note.

## 2017-09-22 NOTE — PROGRESS NOTES
Chief Complaint   Patient presents with   • Follow-Up     Dyspnea       HPI:     She has a remote history of tuberculosis some 30 years ago, treated in [place] with medications over a period of a year.  She has a history of chronic asthma, followed by Dr. Haines.  She had been on Symbicort 160/4.5 at 2 puffs twice a day in addition to an albuterol metered-dose inhaler.  At this time her symptoms are stable with chronic cough that produces only occasional amounts of clear sputum without purulence or hemoptysis.  She enjoys reasonable exercise tolerance and is not awakening at night with respiratory symptoms.  Recent pulmonary function studies demonstrated some decrease in mid maximal expiratory flow rates and some possible hyperinflation.Dr. Haines has suggested the addition of Spiriva if possible.     In addition she has a chronic cough and radiographic evidence for at least mild bronchiectasis involving primarily the right middle lobe with some scarring at both lung bases.    Past Medical History:   Diagnosis Date   • Environmental allergies 7/3/2013   • Bronchiectasis (CMS-HCC)    • Bronchitis    • Hepatitis B carrier    • History of pneumothorax    • Hypertension    • Pneumonia    • Positive TB test     treated 1990's   • Tuberculosis    • Tuberculosis exposure        ROS:   Constitutional: Denies fevers, chills, night sweats, fatigue or weight loss  Eyes: Denies vision loss, pain, drainage, double vision  Ears, Nose, Throat: Denies earache, tinnitus, hoarseness  Cardiovascular: Denies chest pain, tightness, palpitations  Respiratory: Denies shortness of breath, sputum production, cough, hemoptysis  Sleep: Denies, snoring, apnea  GI: Denies abdominal pain, nausea, vomiting, diarrhea  : Denies frequent urination, hematuria, painful urination  Musculoskeletal: Denies back pain, painful joints, sore muscles  Neurological: Denies headaches, seizures  Skin: Denies rashes, color changes  Psychiatric: Denies depression  or thoughts of suicide  Hematologic: Denies bleeding tendency or clotting tendency  Allergic/Immunologic: Denies rhinitis, skin sensitivity    Social History     Social History   • Marital status:      Spouse name: N/A   • Number of children: 3   • Years of education: N/A     Occupational History   • nutrition services      Social History Main Topics   • Smoking status: Never Smoker   • Smokeless tobacco: Never Used   • Alcohol use 0.0 oz/week      Comment: once a month   • Drug use: No   • Sexual activity: Yes     Partners: Male     Other Topics Concern   • Not on file     Social History Narrative   • No narrative on file     Seasonal  Current Outpatient Prescriptions on File Prior to Visit   Medication Sig Dispense Refill   • budesonide-formoterol (SYMBICORT) 160-4.5 MCG/ACT Aerosol Inhale 2 Puffs by mouth 2 Times a Day. Rinse mouth after each use 1 Inhaler 5   • albuterol 108 (90 BASE) MCG/ACT Aero Soln inhalation aerosol Inhale 2 Puffs by mouth every 6 hours as needed for Shortness of Breath. 8.5 g 1   • losartan (COZAAR) 50 MG Tab TAKE 1/2 TABLET BY MOUTH IN THE MORNING THEN TAKE 1 TABLET IN THE EVENING 135 Tab 1   • cetirizine (ZYRTEC) 10 MG Tab Take 10 mg by mouth every day.     • nystatin (MYCOSTATIN) powder Apply to affected areas twice daily until resolved. 15 g 1   • omeprazole (PRILOSEC) 20 MG delayed-release capsule Take 1 Cap by mouth every day. 30 Cap 3   • fluticasone (FLONASE) 50 MCG/ACT nasal spray Spray 1 Spray in nose 2 times a day. 1 Bottle 3   • Multiple Vitamin (MULTIVITAMIN) capsule Take 1 Cap by mouth every day. 100 Cap 0   • Cholecalciferol (VITAMIN D) 2000 UNIT TABS Take 2 Tabs by mouth every day. 30 Tab 0   • aspirin (ASA) 81 MG CHEW chewable tablet Take 1 Tab by mouth every day. 100 Tab 11   • Omega-3 Fatty Acids (FISH OIL) 1000 MG CAPS capsule Take 1 Cap by mouth 3 times a day, with meals. 90 Cap 0   • predniSONE (DELTASONE) 20 MG Tab Take 1 Tab by mouth 2 times a day. Take with  "breakfast and lunch 10 Tab 0   • azithromycin (ZITHROMAX) 250 MG Tab 500 mg day one and 250 mg day 2-5 6 Tab 0   • azithromycin (ZITHROMAX) 250 MG Tab Take as directed (Patient not taking: Reported on 12/22/2016) 6 Tab 0   • azithromycin (ZITHROMAX) 250 MG Tab Take as directed on package. Dispense one package. (Patient not taking: Reported on 12/22/2016) 6 Tab 0   • promethazine-codeine (PHENERGAN-CODEINE) 6.25-10 MG/5ML Syrup Take 5 mL by mouth every 12 hours as needed for Cough. (Patient not taking: Reported on 12/22/2016) 120 mL 0   • montelukast (SINGULAIR) 10 MG Tab Take 10 mg by mouth every day.     • Pramoxine-Menthol (GOLD BOND MEDICATED ANTI ITCH EX) by Apply externally route.     • hydrocod polst-chlorphen polst (TUSSIONEX) 10-8 MG/5ML Liquid CR Take 5 mL by mouth every 12 hours. 120 mL 0   • Calcium Carbonate Antacid (CALCIUM ANTACID ULTRA) 1000 MG CHEW Take 1 Tab by mouth every day. 60 Tab 0   • ibuprofen (MOTRIN) 800 MG TABS Take 1 Tab by mouth every 8 hours as needed for Mild Pain. 90 Tab 3   • loratadine (CLARITIN) 10 MG TABS Take 10 mg by mouth every day.       No current facility-administered medications on file prior to visit.      Blood pressure 118/80, pulse 68, temperature 36.8 °C (98.3 °F), resp. rate 14, height 1.575 m (5' 2\"), weight 68 kg (150 lb), SpO2 94 %.  Family History   Problem Relation Age of Onset   • Heart Disease Mother    • Diabetes Mother    • Heart Disease Father    • Hypertension Sister    • Other Sister      1 sister - kidney transplant   • Diabetes Sister    • Diabetes Brother      1 brother       Physical Exam:    HEENT: PERRLA, EOMI, no scleral icterus, no nasal or oral lesions  Neck: No thyromegaly, no adenopathy, no bruits  Mallampatti: Grade II  Lungs: Equal breath sounds, no wheezes or crackles  Heart: Regular rate and rhythm, no gallops or murmurs  Abdomen: Soft, benign, no organomegaly  Extremities: No clubbing, cyanosis, or edema  Neurologic: Cranial nerve, motor, " and sensory exam are normal    1. Bronchiectasis without complication (CMS-HCC)    2. Uncomplicated asthma, unspecified asthma severity    3. History of tuberculosis        She will continue Symbicort and albuterol  We will give her a sample for Spiriva and if it helps she can fill a prescription for Spiriva  We will see her back in 6 months after a repeat CT scan for her bronchiectasis/nodules  She will continue to see  for her allergies, asthma, and immunotherapy

## 2017-09-26 ENCOUNTER — HOSPITAL ENCOUNTER (OUTPATIENT)
Dept: LAB | Facility: MEDICAL CENTER | Age: 57
End: 2017-09-26
Attending: PHYSICIAN ASSISTANT
Payer: COMMERCIAL

## 2017-09-26 PROCEDURE — 86803 HEPATITIS C AB TEST: CPT

## 2017-09-26 PROCEDURE — 87340 HEPATITIS B SURFACE AG IA: CPT

## 2017-09-26 PROCEDURE — 86706 HEP B SURFACE ANTIBODY: CPT

## 2017-09-26 PROCEDURE — 82107 ALPHA-FETOPROTEIN L3: CPT

## 2017-09-26 PROCEDURE — 87517 HEPATITIS B DNA QUANT: CPT

## 2017-09-26 PROCEDURE — 86704 HEP B CORE ANTIBODY TOTAL: CPT

## 2017-09-26 PROCEDURE — 36415 COLL VENOUS BLD VENIPUNCTURE: CPT

## 2017-09-27 LAB
HBV CORE AB SERPL QL IA: REACTIVE
HBV SURFACE AB SERPL IA-ACNC: >1000 MIU/ML (ref 0–10)
HBV SURFACE AG SER QL: NEGATIVE
HCV AB SER QL: NEGATIVE

## 2017-09-28 LAB
AFP L3 MFR SERPL: <0.5 % (ref 0–9.9)
AFP SERPL-MCNC: 3 NG/ML (ref 0–15)

## 2017-09-30 LAB
HBV DNA PCR 551511: NORMAL IU/ML
LOG10 HBV IUML Z4769: NORMAL LOG10IU/ML
REF LAB TEST REF RANGE: NORMAL

## 2017-10-13 ENCOUNTER — HOSPITAL ENCOUNTER (OUTPATIENT)
Dept: RADIOLOGY | Facility: MEDICAL CENTER | Age: 57
End: 2017-10-13
Attending: INTERNAL MEDICINE
Payer: COMMERCIAL

## 2017-10-13 DIAGNOSIS — J47.9 BRONCHIECTASIS WITHOUT COMPLICATION (HCC): ICD-10-CM

## 2017-10-13 PROCEDURE — 71250 CT THORAX DX C-: CPT

## 2018-01-07 ENCOUNTER — OFFICE VISIT (OUTPATIENT)
Dept: URGENT CARE | Facility: CLINIC | Age: 58
End: 2018-01-07
Payer: COMMERCIAL

## 2018-01-07 VITALS
HEART RATE: 95 BPM | TEMPERATURE: 97.4 F | RESPIRATION RATE: 15 BRPM | WEIGHT: 155.2 LBS | SYSTOLIC BLOOD PRESSURE: 102 MMHG | BODY MASS INDEX: 28.56 KG/M2 | OXYGEN SATURATION: 93 % | DIASTOLIC BLOOD PRESSURE: 56 MMHG | HEIGHT: 62 IN

## 2018-01-07 DIAGNOSIS — J40 BRONCHITIS: ICD-10-CM

## 2018-01-07 PROCEDURE — 99214 OFFICE O/P EST MOD 30 MIN: CPT | Performed by: PHYSICIAN ASSISTANT

## 2018-01-07 RX ORDER — AZITHROMYCIN 250 MG/1
TABLET, FILM COATED ORAL
Qty: 6 TAB | Refills: 0 | Status: SHIPPED | OUTPATIENT
Start: 2018-01-07 | End: 2018-09-11

## 2018-01-07 RX ORDER — BENZONATATE 100 MG/1
100 CAPSULE ORAL 3 TIMES DAILY PRN
Qty: 60 CAP | Refills: 0 | Status: SHIPPED | OUTPATIENT
Start: 2018-01-07 | End: 2018-09-11

## 2018-01-07 ASSESSMENT — COPD QUESTIONNAIRES: COPD: 1

## 2018-01-07 ASSESSMENT — ENCOUNTER SYMPTOMS
DIZZINESS: 0
VOMITING: 0
WHEEZING: 1
ABDOMINAL PAIN: 0
SPUTUM PRODUCTION: 1
FEVER: 0
MUSCULOSKELETAL NEGATIVE: 1
NAUSEA: 0
SHORTNESS OF BREATH: 0
SORE THROAT: 0
CHILLS: 0
DIARRHEA: 0
COUGH: 1

## 2018-01-07 NOTE — PROGRESS NOTES
"Subjective:      Dayan Sanchez is a 57 y.o. female who presents with Cough (x1week, cough, wheezing)            Cough   This is a new problem. The current episode started 1 to 4 weeks ago (1 week). The problem has been unchanged. The problem occurs every few minutes. The cough is productive of sputum. Associated symptoms include nasal congestion, postnasal drip and wheezing. Pertinent negatives include no chest pain, chills, ear congestion, ear pain, fever, rash, sore throat or shortness of breath. The symptoms are aggravated by lying down. She has tried OTC cough suppressant for the symptoms. The treatment provided mild relief. Her past medical history is significant for bronchitis and COPD. There is no history of asthma or pneumonia.     Patient presents to urgent care reporting a 1 week history of a worsening cough, slightly productive. She has COPD and currently takes Singular and symbicort daily. No fevers, chills, body aches, chest pain or SOB. No history of pneumonia or recent use of antibiotics.     Review of Systems   Constitutional: Negative for chills and fever.   HENT: Positive for congestion and postnasal drip. Negative for ear pain and sore throat.    Respiratory: Positive for cough, sputum production and wheezing. Negative for shortness of breath.    Cardiovascular: Negative for chest pain.   Gastrointestinal: Negative for abdominal pain, diarrhea, nausea and vomiting.   Genitourinary: Negative.    Musculoskeletal: Negative.    Skin: Negative for rash.   Neurological: Negative for dizziness.        Objective:     /56   Pulse 95   Temp 36.3 °C (97.4 °F)   Resp 15   Ht 1.575 m (5' 2\")   Wt 70.4 kg (155 lb 3.2 oz)   SpO2 93%   BMI 28.39 kg/m²      Physical Exam   Constitutional: She is oriented to person, place, and time. She appears well-developed and well-nourished. No distress.   HENT:   Head: Normocephalic and atraumatic.   Right Ear: Hearing, tympanic membrane, external ear and ear " canal normal.   Left Ear: Hearing, tympanic membrane, external ear and ear canal normal.   Mouth/Throat: Oropharynx is clear and moist. No oropharyngeal exudate, posterior oropharyngeal edema or posterior oropharyngeal erythema.   Eyes: Conjunctivae are normal. Pupils are equal, round, and reactive to light.   Neck: Normal range of motion.   Cardiovascular: Normal rate, regular rhythm and normal heart sounds.    No murmur heard.  Pulmonary/Chest: Effort normal. No respiratory distress. She has wheezes. She has rales.   Musculoskeletal: Normal range of motion.   Lymphadenopathy:     She has no cervical adenopathy.   Neurological: She is alert and oriented to person, place, and time.   Skin: Skin is warm and dry. She is not diaphoretic.   Psychiatric: She has a normal mood and affect. Her behavior is normal.   Nursing note and vitals reviewed.         PMH:  has a past medical history of Bronchiectasis (CMS-HCC); Bronchitis; Environmental allergies (7/3/2013); Hepatitis B carrier (CMS-HCC); History of pneumothorax; Hypertension; Pneumonia; Positive TB test; Tuberculosis; and Tuberculosis exposure. She also has no past medical history of Breast cancer (CMS-HCC).  MEDS:   Current Outpatient Prescriptions:   •  benzonatate (TESSALON) 100 MG Cap, Take 1 Cap by mouth 3 times a day as needed for Cough., Disp: 60 Cap, Rfl: 0  •  azithromycin (ZITHROMAX) 250 MG Tab, Take 2 tablets by mouth on day one, then 1 tablet by mouth on days two through five, Disp: 6 Tab, Rfl: 0  •  budesonide-formoterol (SYMBICORT) 160-4.5 MCG/ACT Aerosol, Inhale 2 Puffs by mouth 2 Times a Day. Rinse mouth after each use, Disp: 1 Inhaler, Rfl: 5  •  albuterol 108 (90 BASE) MCG/ACT Aero Soln inhalation aerosol, Inhale 2 Puffs by mouth every 6 hours as needed for Shortness of Breath., Disp: 8.5 g, Rfl: 1  •  losartan (COZAAR) 50 MG Tab, TAKE 1/2 TABLET BY MOUTH IN THE MORNING THEN TAKE 1 TABLET IN THE EVENING, Disp: 135 Tab, Rfl: 1  •  Multiple Vitamin  (MULTIVITAMIN) capsule, Take 1 Cap by mouth every day., Disp: 100 Cap, Rfl: 0  •  Cholecalciferol (VITAMIN D) 2000 UNIT TABS, Take 2 Tabs by mouth every day., Disp: 30 Tab, Rfl: 0  •  aspirin (ASA) 81 MG CHEW chewable tablet, Take 1 Tab by mouth every day., Disp: 100 Tab, Rfl: 11  •  Omega-3 Fatty Acids (FISH OIL) 1000 MG CAPS capsule, Take 1 Cap by mouth 3 times a day, with meals., Disp: 90 Cap, Rfl: 0  •  Tiotropium Bromide Monohydrate (SPIRIVA RESPIMAT) 2.5 MCG/ACT Aero Soln, Inhale 2 Inhalation by mouth every day. Two inhalations once daily.  Assemble and prime., Disp: 60 Inhaler, Rfl: 11  •  Tiotropium Bromide Monohydrate (SPIRIVA RESPIMAT) 2.5 MCG/ACT Aero Soln, Inhale 2 Inhalation by mouth every day. Two inhalations once daily.  Assemble and prime., Disp: 60 Inhaler, Rfl: 0  •  budesonide-formoterol (SYMBICORT) 160-4.5 MCG/ACT Aerosol, Inhale 2 Puffs by mouth 2 Times a Day. Use with spacer.  Rinse mouth after each use., Disp: 1 Inhaler, Rfl: 0  •  predniSONE (DELTASONE) 20 MG Tab, Take 1 Tab by mouth 2 times a day. Take with breakfast and lunch, Disp: 10 Tab, Rfl: 0  •  promethazine-codeine (PHENERGAN-CODEINE) 6.25-10 MG/5ML Syrup, Take 5 mL by mouth every 12 hours as needed for Cough. (Patient not taking: Reported on 12/22/2016), Disp: 120 mL, Rfl: 0  •  cetirizine (ZYRTEC) 10 MG Tab, Take 10 mg by mouth every day., Disp: , Rfl:   •  montelukast (SINGULAIR) 10 MG Tab, Take 10 mg by mouth every day., Disp: , Rfl:   •  Pramoxine-Menthol (GOLD BOND MEDICATED ANTI ITCH EX), by Apply externally route., Disp: , Rfl:   •  nystatin (MYCOSTATIN) powder, Apply to affected areas twice daily until resolved., Disp: 15 g, Rfl: 1  •  omeprazole (PRILOSEC) 20 MG delayed-release capsule, Take 1 Cap by mouth every day., Disp: 30 Cap, Rfl: 3  •  fluticasone (FLONASE) 50 MCG/ACT nasal spray, Spray 1 Spray in nose 2 times a day., Disp: 1 Bottle, Rfl: 3  •  hydrocod polst-chlorphen polst (TUSSIONEX) 10-8 MG/5ML Liquid CR, Take 5  mL by mouth every 12 hours., Disp: 120 mL, Rfl: 0  •  Calcium Carbonate Antacid (CALCIUM ANTACID ULTRA) 1000 MG CHEW, Take 1 Tab by mouth every day., Disp: 60 Tab, Rfl: 0  •  ibuprofen (MOTRIN) 800 MG TABS, Take 1 Tab by mouth every 8 hours as needed for Mild Pain., Disp: 90 Tab, Rfl: 3  •  loratadine (CLARITIN) 10 MG TABS, Take 10 mg by mouth every day., Disp: , Rfl:   ALLERGIES:   Allergies   Allergen Reactions   • Seasonal      SURGHX:   Past Surgical History:   Procedure Laterality Date   • CATARACT EXTRACTION WITH IOL     • LIVER BIOPSY     • TUBAL LIGATION       SOCHX:  reports that she has never smoked. She has never used smokeless tobacco. She reports that she drinks alcohol. She reports that she does not use drugs.  FH: family history includes Diabetes in her brother, mother, and sister; Heart Disease in her father and mother; Hypertension in her sister; Other in her sister.       Assessment/Plan:     1. Bronchitis  - benzonatate (TESSALON) 100 MG Cap; Take 1 Cap by mouth 3 times a day as needed for Cough.  Dispense: 60 Cap; Refill: 0  - azithromycin (ZITHROMAX) 250 MG Tab; Take 2 tablets by mouth on day one, then 1 tablet by mouth on days two through five  Dispense: 6 Tab; Refill: 0   - Complete full course of antibiotics as prescribed     Advised patient symptoms are most likely viral in etiology, recommend supportive care. Increased fluids and rest. Contingent abx given if symptoms persist/worsen after 3-4 days. Call or return to office if symptoms persist or worsen. The patient demonstrated a good understanding and agreed with the treatment plan.

## 2018-04-03 ENCOUNTER — HOSPITAL ENCOUNTER (OUTPATIENT)
Dept: RADIOLOGY | Facility: MEDICAL CENTER | Age: 58
End: 2018-04-03
Attending: PHYSICIAN ASSISTANT
Payer: COMMERCIAL

## 2018-04-03 DIAGNOSIS — B18.1 HEPATITIS B CARRIER (HCC): ICD-10-CM

## 2018-04-03 PROCEDURE — 76705 ECHO EXAM OF ABDOMEN: CPT

## 2018-05-01 ENCOUNTER — HOSPITAL ENCOUNTER (OUTPATIENT)
Dept: LAB | Facility: MEDICAL CENTER | Age: 58
End: 2018-05-01
Attending: FAMILY MEDICINE
Payer: COMMERCIAL

## 2018-05-01 LAB
ALBUMIN SERPL BCP-MCNC: 4.3 G/DL (ref 3.2–4.9)
ALP SERPL-CCNC: 59 U/L (ref 30–99)
ALT SERPL-CCNC: 30 U/L (ref 2–50)
AST SERPL-CCNC: 26 U/L (ref 12–45)
BILIRUB CONJ SERPL-MCNC: <0.1 MG/DL (ref 0.1–0.5)
BILIRUB INDIRECT SERPL-MCNC: NORMAL MG/DL (ref 0–1)
BILIRUB SERPL-MCNC: 0.4 MG/DL (ref 0.1–1.5)
PROT SERPL-MCNC: 7.8 G/DL (ref 6–8.2)

## 2018-05-01 PROCEDURE — 36415 COLL VENOUS BLD VENIPUNCTURE: CPT

## 2018-05-01 PROCEDURE — 80076 HEPATIC FUNCTION PANEL: CPT

## 2018-05-01 PROCEDURE — 87517 HEPATITIS B DNA QUANT: CPT

## 2018-05-01 PROCEDURE — 82107 ALPHA-FETOPROTEIN L3: CPT

## 2018-05-04 LAB
AFP L3 MFR SERPL: <0.5 % (ref 0–9.9)
AFP SERPL-MCNC: 3 NG/ML (ref 0–15)
HBV DNA PCR 551511: NORMAL IU/ML
LOG10 HBV IUML Z4769: NORMAL LOG10 IU/ML
REF LAB TEST REF RANGE: NORMAL

## 2018-07-25 ENCOUNTER — HOSPITAL ENCOUNTER (EMERGENCY)
Facility: MEDICAL CENTER | Age: 58
End: 2018-07-25
Attending: EMERGENCY MEDICINE
Payer: COMMERCIAL

## 2018-07-25 VITALS
DIASTOLIC BLOOD PRESSURE: 80 MMHG | SYSTOLIC BLOOD PRESSURE: 140 MMHG | WEIGHT: 151.01 LBS | OXYGEN SATURATION: 94 % | TEMPERATURE: 96.9 F | HEART RATE: 77 BPM | RESPIRATION RATE: 18 BRPM | BODY MASS INDEX: 27.79 KG/M2 | HEIGHT: 62 IN

## 2018-07-25 DIAGNOSIS — S00.03XA HEMATOMA OF SCALP, INITIAL ENCOUNTER: ICD-10-CM

## 2018-07-25 DIAGNOSIS — S09.90XA CLOSED HEAD INJURY, INITIAL ENCOUNTER: ICD-10-CM

## 2018-07-25 DIAGNOSIS — S00.83XA FACIAL CONTUSION, INITIAL ENCOUNTER: ICD-10-CM

## 2018-07-25 PROCEDURE — 99283 EMERGENCY DEPT VISIT LOW MDM: CPT

## 2018-07-25 ASSESSMENT — PAIN SCALES - GENERAL: PAINLEVEL_OUTOF10: 8

## 2018-07-26 NOTE — ED NOTES
Patient reports falling on deck, denies blood thinners, lump to back of head, and swelling to right side of face.

## 2018-07-26 NOTE — ED PROVIDER NOTES
ED Provider Note    CHIEF COMPLAINT  Chief Complaint   Patient presents with   • Closed Head Injury       HPI  Dayan Sanchez is a 58 y.o. female who presents several hours after she had a mechanical fall from about a foot high up on a step, striking her head.  She had no loss of consciousness.  She got herself up and got in touch with her family.  She has had no vomiting since then.  They put ice on the bumps on her head, and they have gone down significantly.  There has been no unusual behavior.  She was dizzy when she first stood up, but she states that has improved as well.  She occasionally takes baby aspirin and Motrin, but she has not on any blood thinners.    REVIEW OF SYSTEMS  See HPI for further details.  No vomiting, no loss of consciousness    PAST MEDICAL HISTORY  Past Medical History:   Diagnosis Date   • Environmental allergies 7/3/2013   • Bronchiectasis (CMS-HCC) (HCC)    • Bronchitis    • Hepatitis B carrier (CMS-HCC) (HCC)    • History of pneumothorax    • Hypertension    • Pneumonia    • Positive TB test     treated 1990's   • Tuberculosis    • Tuberculosis exposure        FAMILY HISTORY  Family History   Problem Relation Age of Onset   • Heart Disease Mother    • Diabetes Mother    • Heart Disease Father    • Hypertension Sister    • Other Sister         1 sister - kidney transplant   • Diabetes Sister    • Diabetes Brother         1 brother       SOCIAL HISTORY  Social History     Social History   • Marital status:      Spouse name: N/A   • Number of children: 3   • Years of education: N/A     Occupational History   • nutrition services      Social History Main Topics   • Smoking status: Never Smoker   • Smokeless tobacco: Never Used   • Alcohol use 0.0 oz/week      Comment: once a month   • Drug use: No   • Sexual activity: Yes     Partners: Male     Other Topics Concern   • Not on file     Social History Narrative   • No narrative on file       SURGICAL HISTORY  Past Surgical  History:   Procedure Laterality Date   • CATARACT EXTRACTION WITH IOL     • LIVER BIOPSY     • TUBAL LIGATION         CURRENT MEDICATIONS  No current facility-administered medications for this encounter.     Current Outpatient Prescriptions:   •  losartan (COZAAR) 50 MG Tab, TAKE 1/2 TABLET BY MOUTH IN THE MORNING THEN TAKE 1 TABLET IN THE EVENING, Disp: 135 Tab, Rfl: 1  •  benzonatate (TESSALON) 100 MG Cap, Take 1 Cap by mouth 3 times a day as needed for Cough., Disp: 60 Cap, Rfl: 0  •  azithromycin (ZITHROMAX) 250 MG Tab, Take 2 tablets by mouth on day one, then 1 tablet by mouth on days two through five, Disp: 6 Tab, Rfl: 0  •  Tiotropium Bromide Monohydrate (SPIRIVA RESPIMAT) 2.5 MCG/ACT Aero Soln, Inhale 2 Inhalation by mouth every day. Two inhalations once daily.  Assemble and prime., Disp: 60 Inhaler, Rfl: 11  •  Tiotropium Bromide Monohydrate (SPIRIVA RESPIMAT) 2.5 MCG/ACT Aero Soln, Inhale 2 Inhalation by mouth every day. Two inhalations once daily.  Assemble and prime., Disp: 60 Inhaler, Rfl: 0  •  budesonide-formoterol (SYMBICORT) 160-4.5 MCG/ACT Aerosol, Inhale 2 Puffs by mouth 2 Times a Day. Use with spacer.  Rinse mouth after each use., Disp: 1 Inhaler, Rfl: 0  •  budesonide-formoterol (SYMBICORT) 160-4.5 MCG/ACT Aerosol, Inhale 2 Puffs by mouth 2 Times a Day. Rinse mouth after each use, Disp: 1 Inhaler, Rfl: 5  •  albuterol 108 (90 BASE) MCG/ACT Aero Soln inhalation aerosol, Inhale 2 Puffs by mouth every 6 hours as needed for Shortness of Breath., Disp: 8.5 g, Rfl: 1  •  predniSONE (DELTASONE) 20 MG Tab, Take 1 Tab by mouth 2 times a day. Take with breakfast and lunch, Disp: 10 Tab, Rfl: 0  •  promethazine-codeine (PHENERGAN-CODEINE) 6.25-10 MG/5ML Syrup, Take 5 mL by mouth every 12 hours as needed for Cough. (Patient not taking: Reported on 12/22/2016), Disp: 120 mL, Rfl: 0  •  cetirizine (ZYRTEC) 10 MG Tab, Take 10 mg by mouth every day., Disp: , Rfl:   •  montelukast (SINGULAIR) 10 MG Tab, Take 10  "mg by mouth every day., Disp: , Rfl:   •  Pramoxine-Menthol (GOLD BOND MEDICATED ANTI ITCH EX), by Apply externally route., Disp: , Rfl:   •  nystatin (MYCOSTATIN) powder, Apply to affected areas twice daily until resolved., Disp: 15 g, Rfl: 1  •  omeprazole (PRILOSEC) 20 MG delayed-release capsule, Take 1 Cap by mouth every day., Disp: 30 Cap, Rfl: 3  •  fluticasone (FLONASE) 50 MCG/ACT nasal spray, Spray 1 Spray in nose 2 times a day., Disp: 1 Bottle, Rfl: 3  •  hydrocod polst-chlorphen polst (TUSSIONEX) 10-8 MG/5ML Liquid CR, Take 5 mL by mouth every 12 hours., Disp: 120 mL, Rfl: 0  •  Multiple Vitamin (MULTIVITAMIN) capsule, Take 1 Cap by mouth every day., Disp: 100 Cap, Rfl: 0  •  Cholecalciferol (VITAMIN D) 2000 UNIT TABS, Take 2 Tabs by mouth every day., Disp: 30 Tab, Rfl: 0  •  Calcium Carbonate Antacid (CALCIUM ANTACID ULTRA) 1000 MG CHEW, Take 1 Tab by mouth every day., Disp: 60 Tab, Rfl: 0  •  aspirin (ASA) 81 MG CHEW chewable tablet, Take 1 Tab by mouth every day., Disp: 100 Tab, Rfl: 11  •  Omega-3 Fatty Acids (FISH OIL) 1000 MG CAPS capsule, Take 1 Cap by mouth 3 times a day, with meals., Disp: 90 Cap, Rfl: 0  •  ibuprofen (MOTRIN) 800 MG TABS, Take 1 Tab by mouth every 8 hours as needed for Mild Pain., Disp: 90 Tab, Rfl: 3  •  loratadine (CLARITIN) 10 MG TABS, Take 10 mg by mouth every day., Disp: , Rfl:       ALLERGIES  Allergies   Allergen Reactions   • Seasonal        PHYSICAL EXAM  VITAL SIGNS: /93   Pulse 74   Temp 36.1 °C (97 °F)   Resp 16   Ht 1.575 m (5' 2\")   Wt 68.5 kg (151 lb 0.2 oz)   SpO2 97%   BMI 27.62 kg/m²  @[facility][[id number]::@   Constitutional: Well developed, Well nourished, No acute respiratory distress, Non-toxic appearance.   HENT: Normocephalic, small hematomas above the right eyebrow and top of the occiput, Bilateral external ears normal, Oropharynx clear, mucous membranes are moist.  No hemotympanum bilaterally  Eyes: Conjunctiva normal, No discharge. No icterus.  " PERRLA at 4 mm bilaterally.  Extraocular movements are intact  Neck: Normal range of motion. Supple.  No tenderness to the C-spine  Skin: Warm, Dry, No erythema, No rash.  Ecchymosis above the right eyebrow  Musculoskeletal: Good range of motion in all major joints. Normal gait.  Neurologic: Alert & oriented x 3. No focal deficits noted.  Sensation is intact bilateral upper extremities and face.        COURSE & MEDICAL DECISION MAKING  Pertinent Labs & Imaging studies reviewed. (See chart for details)  Patient is neurologically intact.  No signs of skull fracture, intracranial hemorrhage.  She is not on any blood thinners.  It is many hours after the initial event, and she has done nothing but improved.  They are comfortable going home without radiographs.     The patient will return for new or worsening symptoms and is stable at the time of discharge.    The patient is referred to a primary physician for blood pressure management, diabetic screening, and for all other preventative health concerns.    DISPOSITION:  Patient will be discharged home in stable condition.    FOLLOW UP:  [facility], Emergency Dept  [facility] [facility] [id number] [phone]    As needed, If symptoms worsen      OUTPATIENT MEDICATIONS:  New Prescriptions    No medications on file       FINAL IMPRESSION  1. Closed head injury, initial encounter    2. Facial contusion, initial encounter    3. Hematoma of scalp, initial encounter               Electronically signed by: Rosalba Barton, 7/25/2018 9:46 PM

## 2018-08-13 RX ORDER — LOSARTAN POTASSIUM 50 MG/1
TABLET ORAL
Qty: 135 TAB | Refills: 1 | Status: SHIPPED | OUTPATIENT
Start: 2018-08-13 | End: 2019-02-27 | Stop reason: SDUPTHER

## 2018-08-24 ENCOUNTER — HOSPITAL ENCOUNTER (OUTPATIENT)
Dept: RADIOLOGY | Facility: MEDICAL CENTER | Age: 58
End: 2018-08-24
Attending: FAMILY MEDICINE
Payer: COMMERCIAL

## 2018-08-24 DIAGNOSIS — Z12.31 VISIT FOR SCREENING MAMMOGRAM: ICD-10-CM

## 2018-08-24 PROCEDURE — 77067 SCR MAMMO BI INCL CAD: CPT

## 2018-09-05 ENCOUNTER — APPOINTMENT (OUTPATIENT)
Dept: MEDICAL GROUP | Facility: PHYSICIAN GROUP | Age: 58
End: 2018-09-05
Payer: COMMERCIAL

## 2018-09-11 ENCOUNTER — OFFICE VISIT (OUTPATIENT)
Dept: MEDICAL GROUP | Facility: PHYSICIAN GROUP | Age: 58
End: 2018-09-11
Payer: COMMERCIAL

## 2018-09-11 VITALS
HEIGHT: 62 IN | SYSTOLIC BLOOD PRESSURE: 120 MMHG | HEART RATE: 88 BPM | DIASTOLIC BLOOD PRESSURE: 70 MMHG | TEMPERATURE: 97.6 F | BODY MASS INDEX: 26.98 KG/M2 | WEIGHT: 146.61 LBS | OXYGEN SATURATION: 97 %

## 2018-09-11 DIAGNOSIS — Z23 NEED FOR IMMUNIZATION AGAINST INFLUENZA: ICD-10-CM

## 2018-09-11 DIAGNOSIS — Z56.6 STRESS AT WORK: ICD-10-CM

## 2018-09-11 DIAGNOSIS — Z13.220 SCREENING, LIPID: ICD-10-CM

## 2018-09-11 DIAGNOSIS — B18.1 CHRONIC HEPATITIS B (HCC): ICD-10-CM

## 2018-09-11 DIAGNOSIS — Z00.00 ROUTINE PHYSICAL EXAMINATION: ICD-10-CM

## 2018-09-11 DIAGNOSIS — F32.9 REACTIVE DEPRESSION (SITUATIONAL): ICD-10-CM

## 2018-09-11 DIAGNOSIS — Z13.1 SCREENING FOR DIABETES MELLITUS (DM): ICD-10-CM

## 2018-09-11 PROCEDURE — 90471 IMMUNIZATION ADMIN: CPT | Performed by: FAMILY MEDICINE

## 2018-09-11 PROCEDURE — 99214 OFFICE O/P EST MOD 30 MIN: CPT | Mod: 25 | Performed by: FAMILY MEDICINE

## 2018-09-11 PROCEDURE — 99396 PREV VISIT EST AGE 40-64: CPT | Mod: 25 | Performed by: FAMILY MEDICINE

## 2018-09-11 PROCEDURE — 90686 IIV4 VACC NO PRSV 0.5 ML IM: CPT | Performed by: FAMILY MEDICINE

## 2018-09-11 RX ORDER — ESCITALOPRAM OXALATE 5 MG/1
5 TABLET ORAL DAILY
Qty: 30 TAB | Refills: 1 | Status: SHIPPED | OUTPATIENT
Start: 2018-09-11 | End: 2018-10-11 | Stop reason: SDUPTHER

## 2018-09-11 SDOH — HEALTH STABILITY - MENTAL HEALTH: OTHER PHYSICAL AND MENTAL STRAIN RELATED TO WORK: Z56.6

## 2018-09-13 ENCOUNTER — TELEPHONE (OUTPATIENT)
Dept: MEDICAL GROUP | Facility: PHYSICIAN GROUP | Age: 58
End: 2018-09-13

## 2018-09-13 ASSESSMENT — PATIENT HEALTH QUESTIONNAIRE - PHQ9
5. POOR APPETITE OR OVEREATING: 3 - NEARLY EVERY DAY
CLINICAL INTERPRETATION OF PHQ2 SCORE: 6
SUM OF ALL RESPONSES TO PHQ QUESTIONS 1-9: 24

## 2018-09-13 NOTE — PROGRESS NOTES
Subjective:      Dayan Sanchez is a 58 y.o. female who presents with Anxiety and Annual Exam (pap)          HPI    Patient is here for annual physical exam.  She is not due for GYN exam/Pap smear because we did that in 9/17 that came back normal and she has no abnormal Pap smears before.  Her last mammogram was done in 8/18 that came back no evidence of malignancy.  Her last Tdap was in 9/17.  Last colonoscopy was in 2011.  She needs flu shot today.    She has history of chronic hepatitis B infection followed closely by GI specialist.  Recently her blood work came back she now has hepatitis B surface antibodies and hepatitis B core antibodies therefore she has converted and now has immunity to hepatitis B virus.  Her hepatitis B surface antigen has come back negative recently.  Her gastroenterologist continues to follow her up regularly.  She was informed that there is still potential for her to have active hepatitis B infection especially in the setting of immunosuppressive drugs or when she is immunosuppressed such as inserted diseases.  So far there has not been any evidence of hepatocarcinoma with normal alpha-fetoprotein.  Ultrasound has been unremarkable.    She is also here because she has been having problems with anxiety and depression.  This started about a month ago.  She works for [employer].  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  She said she was told that her position has not been created so she has been getting the same salary as when she was doing production line.  She has been trying to get the position officially as a cook until about 2 weeks ago she was told that somebody got the position and she has to move to the production line.  She felt very discriminated.  She also has hearing some offensive remarks from other coworkers that  she did not get the job.  She filed a complaint regarding discrimination and harassment.  She has learned that her position went to a family member of the manager/supervisor.  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  She said she has not been able to sleep well.  She has been crying a lot.  She has not been able to concentrate.  She has been overeating.  She denies any suicidal thoughts.    No previous history of depression, anxiety.  She has not been treated before for these problems.    She wants paperwork filled out because she wants to have a time off from work from September 12-30, 2018.  Friday last week she only worked for half a day and she did not report yesterday.  This is because she has not been able to drive due to stress and above symptoms.  She does not feel she will be able to do her job because she cannot concentrate due to the amount of stress.      I reviewed the following    Past Medical History:   Diagnosis Date   • Bronchiectasis (HCC)    • Bronchitis    • Environmental allergies 7/3/2013   • Hepatitis B carrier (HCC)    • History of pneumothorax    • Hypertension    • Pneumonia    • Positive TB test     treated 1990's   • Tuberculosis    • Tuberculosis exposure         Past Surgical History:   Procedure Laterality Date   • CATARACT EXTRACTION WITH IOL     • LIVER BIOPSY     • TUBAL LIGATION         Allergies   Allergen Reactions   • Seasonal        Current Outpatient Prescriptions   Medication Sig Dispense Refill   • escitalopram (LEXAPRO) 5 MG tablet Take 1 Tab by mouth every day. 30 Tab 1   • losartan (COZAAR) 50 MG Tab TAKE 1/2 TABLET BY MOUTH IN THE MORNING THEN TAKE 1 TABLET IN THE EVENING 135 Tab 1   • Tiotropium Bromide Monohydrate (SPIRIVA RESPIMAT) 2.5 MCG/ACT Aero Soln Inhale 2 Inhalation by mouth every day. Two inhalations once daily.  Assemble and prime. 60 Inhaler 11   • budesonide-formoterol (SYMBICORT) 160-4.5 MCG/ACT  "Aerosol Inhale 2 Puffs by mouth 2 Times a Day. Rinse mouth after each use 1 Inhaler 5   • cetirizine (ZYRTEC) 10 MG Tab Take 10 mg by mouth every day.     • fluticasone (FLONASE) 50 MCG/ACT nasal spray Spray 1 Spray in nose 2 times a day. 1 Bottle 3   • Multiple Vitamin (MULTIVITAMIN) capsule Take 1 Cap by mouth every day. 100 Cap 0   • Cholecalciferol (VITAMIN D) 2000 UNIT TABS Take 2 Tabs by mouth every day. 30 Tab 0   • aspirin (ASA) 81 MG CHEW chewable tablet Take 1 Tab by mouth every day. 100 Tab 11   • Omega-3 Fatty Acids (FISH OIL) 1000 MG CAPS capsule Take 1 Cap by mouth 3 times a day, with meals. 90 Cap 0   • ibuprofen (MOTRIN) 800 MG TABS Take 1 Tab by mouth every 8 hours as needed for Mild Pain. 90 Tab 3   • loratadine (CLARITIN) 10 MG TABS Take 10 mg by mouth every day.       No current facility-administered medications for this visit.         Family History   Problem Relation Age of Onset   • Heart Disease Mother    • Diabetes Mother    • Heart Disease Father    • Hypertension Sister    • Other Sister         1 sister - kidney transplant   • Diabetes Sister    • Diabetes Brother         1 brother       Social History     Social History   • Marital status:      Spouse name: N/A   • Number of children: 3   • Years of education: N/A     Occupational History   • nutrition services      Social History Main Topics   • Smoking status: Never Smoker   • Smokeless tobacco: Never Used   • Alcohol use 0.0 oz/week      Comment: once a month   • Drug use: No   • Sexual activity: Yes     Partners: Male     Other Topics Concern   • Not on file     Social History Narrative   • No narrative on file                  ROS   As per HPI, the rest are negative.         Objective:     /70   Pulse 88   Temp 36.4 °C (97.6 °F)   Ht 1.575 m (5' 2\")   Wt 66.5 kg (146 lb 9.7 oz)   SpO2 97%   BMI 26.81 kg/m²      Physical Exam   Constitutional: She is oriented to person, place, and time. She appears well-developed " and well-nourished. No distress.   HENT:   Head: Normocephalic and atraumatic.   Right Ear: External ear normal.   Left Ear: External ear normal.   Nose: Nose normal.   Mouth/Throat: Oropharynx is clear and moist. No oropharyngeal exudate.   Eyes: Pupils are equal, round, and reactive to light. Conjunctivae and EOM are normal. Right eye exhibits no discharge. Left eye exhibits no discharge. No scleral icterus.   Neck: Normal range of motion. Neck supple. No JVD present. No tracheal deviation present. No thyromegaly present.   Cardiovascular: Normal rate, regular rhythm, normal heart sounds and intact distal pulses.  Exam reveals no gallop and no friction rub.    No murmur heard.  Pulmonary/Chest: Effort normal and breath sounds normal. No stridor. No respiratory distress. She has no wheezes. She has no rales. She exhibits no tenderness. Right breast exhibits no inverted nipple, no mass, no nipple discharge, no skin change and no tenderness. Left breast exhibits no inverted nipple, no mass, no nipple discharge, no skin change and no tenderness. Breasts are symmetrical. There is no breast swelling.   Abdominal: Soft. Bowel sounds are normal. She exhibits no distension and no mass. There is no tenderness. There is no rebound and no guarding. No hernia.   Genitourinary: No breast tenderness, discharge or bleeding.   Musculoskeletal: Normal range of motion. She exhibits no edema, tenderness or deformity.   Lymphadenopathy:     She has no cervical adenopathy.   Neurological: She is alert and oriented to person, place, and time. She has normal reflexes. She displays normal reflexes. No cranial nerve deficit. She exhibits normal muscle tone. Coordination normal.   Skin: Skin is warm and dry. No rash noted. She is not diaphoretic. No erythema. No pallor.   Psychiatric: She has a normal mood and affect. Her behavior is normal. Judgment and thought content normal.   She appears sad, she is tearful   Nursing note and vitals  reviewed.       PHQ-9 came back score is 24 consistent with severe depression, DEBRA-7 score is 18 consistent with severe anxiety            Assessment/Plan:     1. Routine physical examination  Complete exam was done.  We will send her for screening labs.  Up-to-date with Tdap.  Up-to-date with mammogram and Pap smear.  Up-to-date with colonoscopy.  - LIPID PROFILE; Future  - COMP METABOLIC PANEL; Future  - CBC WITH DIFFERENTIAL; Future    2. Screening, lipid  Screening labs ordered.  - LIPID PROFILE; Future  - COMP METABOLIC PANEL; Future  - CBC WITH DIFFERENTIAL; Future    3. Screening for diabetes mellitus (DM)  Screening labs ordered.  - LIPID PROFILE; Future  - COMP METABOLIC PANEL; Future  - CBC WITH DIFFERENTIAL; Future    4. Chronic hepatitis B (HCC)  She has converted with presence of hepatitis B surface antibody and hepatitis B core antibody with negative hepatitis B surface antigen.  Continue follow-up with GI specialist.    5. Reactive depression (situational)  Patient has severe reactive/situational depression due to stress at work.  We discussed treatment with SSRI and she agrees to proceed.  I will start with low-dose Lexapro 5 mg 1 tablet daily and reevaluate in 3 weeks.  We will check TSH to make sure we are not dealing with thyroid disorder.  Referral placed to psychologist for counseling.  Made aware it can take 2 weeks to about a month for the medication to work.  - escitalopram (LEXAPRO) 5 MG tablet; Take 1 Tab by mouth every day.  Dispense: 30 Tab; Refill: 1  - REFERRAL TO PSYCHOLOGY  - TSH; Future    6. Stress at work  She has situational anxiety which is severe.  We will put her on SSRI which is Lexapro 5 mg 1 tablet daily and reevaluate in 3 weeks.  We will follow-up in 3 weeks.  Referral placed to psychologist.  We will check TSH to rule out thyroid disorder.  - escitalopram (LEXAPRO) 5 MG tablet; Take 1 Tab by mouth every day.  Dispense: 30 Tab; Refill: 1  - REFERRAL TO PSYCHOLOGY  - TSH;  Future    7. Need for immunization against influenza  Flu shot was given.  - INFLUENZA VACCINE QUAD INJ >3Y(PF)      Please note that this dictation was created using voice recognition software. I have worked with consultants from the vendor as well as technical experts from [facility] to optimize the interface. I have made every reasonable attempt to correct obvious errors, but I expect that there are errors of grammar and possibly content I did not discover before finalizing the note.

## 2018-09-13 NOTE — TELEPHONE ENCOUNTER
Phone Number Called: [phone] (home)     Message: Spoke with patient, notified her she may  in front office.     Left Message for patient to call back: N\A

## 2018-09-14 ENCOUNTER — HOSPITAL ENCOUNTER (OUTPATIENT)
Dept: LAB | Facility: MEDICAL CENTER | Age: 58
End: 2018-09-14
Attending: FAMILY MEDICINE
Payer: COMMERCIAL

## 2018-09-14 ENCOUNTER — HOSPITAL ENCOUNTER (OUTPATIENT)
Dept: LAB | Facility: MEDICAL CENTER | Age: 58
End: 2018-09-14
Payer: COMMERCIAL

## 2018-09-14 DIAGNOSIS — F32.9 REACTIVE DEPRESSION (SITUATIONAL): ICD-10-CM

## 2018-09-14 DIAGNOSIS — Z13.220 SCREENING, LIPID: ICD-10-CM

## 2018-09-14 DIAGNOSIS — Z00.00 ROUTINE PHYSICAL EXAMINATION: ICD-10-CM

## 2018-09-14 DIAGNOSIS — Z56.6 STRESS AT WORK: ICD-10-CM

## 2018-09-14 DIAGNOSIS — Z13.1 SCREENING FOR DIABETES MELLITUS (DM): ICD-10-CM

## 2018-09-14 LAB
ALBUMIN SERPL BCP-MCNC: 4.5 G/DL (ref 3.2–4.9)
ALBUMIN/GLOB SERPL: 1.6 G/DL
ALP SERPL-CCNC: 56 U/L (ref 30–99)
ALT SERPL-CCNC: 21 U/L (ref 2–50)
ANION GAP SERPL CALC-SCNC: 9 MMOL/L (ref 0–11.9)
AST SERPL-CCNC: 20 U/L (ref 12–45)
BASOPHILS # BLD AUTO: 1 % (ref 0–1.8)
BASOPHILS # BLD: 0.05 K/UL (ref 0–0.12)
BDY FAT % MEASURED: 35.6 %
BILIRUB SERPL-MCNC: 0.6 MG/DL (ref 0.1–1.5)
BP DIAS: 66 MMHG
BP SYS: 124 MMHG
BUN SERPL-MCNC: 21 MG/DL (ref 8–22)
CALCIUM SERPL-MCNC: 9.2 MG/DL (ref 8.5–10.5)
CHLORIDE SERPL-SCNC: 104 MMOL/L (ref 96–112)
CHOLEST SERPL-MCNC: 169 MG/DL (ref 100–199)
CHOLEST SERPL-MCNC: 170 MG/DL (ref 100–199)
CO2 SERPL-SCNC: 27 MMOL/L (ref 20–33)
CREAT SERPL-MCNC: 0.57 MG/DL (ref 0.5–1.4)
DIABETES HTDIA: NO
EOSINOPHIL # BLD AUTO: 0.23 K/UL (ref 0–0.51)
EOSINOPHIL NFR BLD: 4.5 % (ref 0–6.9)
ERYTHROCYTE [DISTWIDTH] IN BLOOD BY AUTOMATED COUNT: 40.4 FL (ref 35.9–50)
EVENT NAME HTEVT: NORMAL
FASTING STATUS PATIENT QL REPORTED: NORMAL
FASTING STATUS PATIENT QL REPORTED: NORMAL
GLOBULIN SER CALC-MCNC: 2.9 G/DL (ref 1.9–3.5)
GLUCOSE SERPL-MCNC: 97 MG/DL (ref 65–99)
GLUCOSE SERPL-MCNC: 98 MG/DL (ref 65–99)
HCT VFR BLD AUTO: 42.2 % (ref 37–47)
HDLC SERPL-MCNC: 70 MG/DL
HDLC SERPL-MCNC: 72 MG/DL
HGB BLD-MCNC: 13.9 G/DL (ref 12–16)
HYPERTENSION HTHYP: YES
IMM GRANULOCYTES # BLD AUTO: 0.01 K/UL (ref 0–0.11)
IMM GRANULOCYTES NFR BLD AUTO: 0.2 % (ref 0–0.9)
LDLC SERPL CALC-MCNC: 79 MG/DL
LDLC SERPL CALC-MCNC: 83 MG/DL
LYMPHOCYTES # BLD AUTO: 1.6 K/UL (ref 1–4.8)
LYMPHOCYTES NFR BLD: 31.1 % (ref 22–41)
MCH RBC QN AUTO: 30.2 PG (ref 27–33)
MCHC RBC AUTO-ENTMCNC: 32.9 G/DL (ref 33.6–35)
MCV RBC AUTO: 91.7 FL (ref 81.4–97.8)
MONOCYTES # BLD AUTO: 0.28 K/UL (ref 0–0.85)
MONOCYTES NFR BLD AUTO: 5.4 % (ref 0–13.4)
NEUTROPHILS # BLD AUTO: 2.98 K/UL (ref 2–7.15)
NEUTROPHILS NFR BLD: 57.8 % (ref 44–72)
NRBC # BLD AUTO: 0 K/UL
NRBC BLD-RTO: 0 /100 WBC
PLATELET # BLD AUTO: 300 K/UL (ref 164–446)
PMV BLD AUTO: 10 FL (ref 9–12.9)
POTASSIUM SERPL-SCNC: 3.9 MMOL/L (ref 3.6–5.5)
PROT SERPL-MCNC: 7.4 G/DL (ref 6–8.2)
RBC # BLD AUTO: 4.6 M/UL (ref 4.2–5.4)
SCREENING LOC CITY HTCIT: NORMAL
SCREENING LOC STATE HTSTA: NORMAL
SCREENING LOCATION HTLOC: NORMAL
SMOKING HTSMO: NO
SODIUM SERPL-SCNC: 140 MMOL/L (ref 135–145)
SUBSCRIBER ID HTSID: NORMAL
TRIGL SERPL-MCNC: 87 MG/DL (ref 0–149)
TRIGL SERPL-MCNC: 90 MG/DL (ref 0–149)
TSH SERPL DL<=0.005 MIU/L-ACNC: 0.36 UIU/ML (ref 0.38–5.33)
WBC # BLD AUTO: 5.2 K/UL (ref 4.8–10.8)

## 2018-09-14 PROCEDURE — 85025 COMPLETE CBC W/AUTO DIFF WBC: CPT

## 2018-09-14 PROCEDURE — 36415 COLL VENOUS BLD VENIPUNCTURE: CPT

## 2018-09-14 PROCEDURE — 80053 COMPREHEN METABOLIC PANEL: CPT

## 2018-09-14 PROCEDURE — 80061 LIPID PANEL: CPT

## 2018-09-14 PROCEDURE — 84443 ASSAY THYROID STIM HORMONE: CPT

## 2018-09-14 SDOH — HEALTH STABILITY - MENTAL HEALTH: OTHER PHYSICAL AND MENTAL STRAIN RELATED TO WORK: Z56.6

## 2018-09-17 DIAGNOSIS — R79.89 LOW TSH LEVEL: ICD-10-CM

## 2018-10-10 ENCOUNTER — APPOINTMENT (OUTPATIENT)
Dept: MEDICAL GROUP | Facility: PHYSICIAN GROUP | Age: 58
End: 2018-10-10
Payer: COMMERCIAL

## 2018-10-11 ENCOUNTER — OFFICE VISIT (OUTPATIENT)
Dept: MEDICAL GROUP | Facility: PHYSICIAN GROUP | Age: 58
End: 2018-10-11
Payer: COMMERCIAL

## 2018-10-11 VITALS
BODY MASS INDEX: 27.06 KG/M2 | TEMPERATURE: 97.6 F | HEART RATE: 81 BPM | WEIGHT: 147.05 LBS | HEIGHT: 62 IN | DIASTOLIC BLOOD PRESSURE: 80 MMHG | OXYGEN SATURATION: 95 % | SYSTOLIC BLOOD PRESSURE: 120 MMHG

## 2018-10-11 DIAGNOSIS — Z56.6 STRESS AT WORK: ICD-10-CM

## 2018-10-11 DIAGNOSIS — R79.89 LOW TSH LEVEL: ICD-10-CM

## 2018-10-11 DIAGNOSIS — F32.9 REACTIVE DEPRESSION (SITUATIONAL): ICD-10-CM

## 2018-10-11 PROCEDURE — 99213 OFFICE O/P EST LOW 20 MIN: CPT | Performed by: FAMILY MEDICINE

## 2018-10-11 RX ORDER — ESCITALOPRAM OXALATE 5 MG/1
5 TABLET ORAL DAILY
Qty: 30 TAB | Refills: 4 | Status: SHIPPED | OUTPATIENT
Start: 2018-10-11 | End: 2019-01-07 | Stop reason: SDUPTHER

## 2018-10-11 SDOH — HEALTH STABILITY - MENTAL HEALTH: OTHER PHYSICAL AND MENTAL STRAIN RELATED TO WORK: Z56.6

## 2018-10-13 NOTE — PROGRESS NOTES
"Subjective:      Dayan Sanchez is a 58 y.o. female who presents with Anxiety            HPI     She returns for one-month follow-up of reactive/situational depression due to stress at work.  I started her on Lexapro 5 mg 1 tablet daily a month ago.  She said this is working well to help with her symptoms.  I referred her to psychologist for counseling and her appointment is next month.  We had her off work until September 30 and she went back to work on October 1.  She filed a complaint to the higher authority and they called her to have a hearing scheduled later this month.  She feels relieved that they are taking action on her complaint.    We did her blood work and her TSH is very mildly low.  She already had a slightly low TSH back in 2016 with mildly high T3.  Her TSH came back this is still very slightly low.  We need to do additional workup.    Past medical history, past surgical history, family history reviewed-no changes    Social history reviewed-no changes    Allergies reviewed-no changes    Medications reviewed-no changes    ROS     As per HPI, the rest are negative.     Objective:     /80 (BP Location: Left arm, Patient Position: Sitting, BP Cuff Size: Adult)   Pulse 81   Temp 36.4 °C (97.6 °F) (Temporal)   Ht 1.575 m (5' 2\")   Wt 66.7 kg (147 lb 0.8 oz)   SpO2 95%   BMI 26.90 kg/m²      Physical Exam     Examined alert, awake, oriented, not in distress    Neck-supple, no lymphadenopathy, no thyromegaly  Lungs-clear to auscultation, no rales, no wheezes  Heart-regular rate and rhythm, no murmur  Extremities-no edema, clubbing, cyanosis  Psych-she is more calm and composed, she is not tearful anymore, judgment normal, behavior normal, thought content normal            Assessment/Plan:     1. Reactive depression (situational)  Good results with Lexapro 5 mg 1 tablet daily.  She will stay on this medication for at least 6 months in depending on how she does we can slowly take her off " the medication after 6 months if she does not need it anymore.  - escitalopram (LEXAPRO) 5 MG tablet; Take 1 Tab by mouth every day.  Dispense: 30 Tab; Refill: 4    2. Stress at work  Plan the same as #1.  - escitalopram (LEXAPRO) 5 MG tablet; Take 1 Tab by mouth every day.  Dispense: 30 Tab; Refill: 4    3. Low TSH level  We will do additional workup TSH free T3, free T4, and check antibodies.  - TSH; Future  - FREE THYROXINE; Future  - T3 FREE; Future  - THYROID PEROXIDASE  (TPO) AB; Future  - ANTITHYROGLOBULIN AB; Future    I will see her for follow-up in 3 months or sooner if needed.      Please note that this dictation was created using voice recognition software. I have worked with consultants from the vendor as well as technical experts from [facility] to optimize the interface. I have made every reasonable attempt to correct obvious errors, but I expect that there are errors of grammar and possibly content I did not discover before finalizing the note.

## 2018-10-15 ENCOUNTER — OFFICE VISIT (OUTPATIENT)
Dept: PULMONOLOGY | Facility: HOSPICE | Age: 58
End: 2018-10-15
Payer: COMMERCIAL

## 2018-10-15 VITALS
OXYGEN SATURATION: 98 % | HEART RATE: 70 BPM | WEIGHT: 147 LBS | SYSTOLIC BLOOD PRESSURE: 118 MMHG | TEMPERATURE: 98.4 F | BODY MASS INDEX: 27.05 KG/M2 | DIASTOLIC BLOOD PRESSURE: 80 MMHG | HEIGHT: 62 IN | RESPIRATION RATE: 14 BRPM

## 2018-10-15 DIAGNOSIS — J45.20 MILD INTERMITTENT ASTHMA WITHOUT COMPLICATION: ICD-10-CM

## 2018-10-15 DIAGNOSIS — Z86.11 HISTORY OF TUBERCULOSIS: ICD-10-CM

## 2018-10-15 DIAGNOSIS — J47.9 BRONCHIECTASIS WITHOUT COMPLICATION (HCC): ICD-10-CM

## 2018-10-15 PROCEDURE — 99214 OFFICE O/P EST MOD 30 MIN: CPT | Performed by: INTERNAL MEDICINE

## 2018-10-15 RX ORDER — BUDESONIDE AND FORMOTEROL FUMARATE DIHYDRATE 80; 4.5 UG/1; UG/1
2 AEROSOL RESPIRATORY (INHALATION) 2 TIMES DAILY
Qty: 1 INHALER | Refills: 11 | Status: SHIPPED | OUTPATIENT
Start: 2018-10-15 | End: 2018-10-25

## 2018-10-15 ASSESSMENT — PAIN SCALES - GENERAL: PAINLEVEL: NO PAIN

## 2018-10-15 NOTE — PROGRESS NOTES
Chief Complaint   Patient presents with   • Follow-Up     Asthma       HPI:  The patient is a 58-year-old woman.  She has a remote history of tuberculosis some 30 years ago, treated in [place] with medications over a period of a year.  She has a history of chronic asthma, followed by Dr. Haines.  She had been on Symbicort 160/4.5 at 2 puffs twice a day in addition to an albuterol metered-dose inhaler.  Her Symbicort dose has been decreased to 80/4.5.  At this time her symptoms are stable with chronic cough that produces only occasional amounts of clear sputum without purulence or hemoptysis.  She enjoys reasonable exercise tolerance and is not awakening at night with respiratory symptoms.  Recent pulmonary function studies demonstrated some decrease in mid maximal expiratory flow rates and some possible hyperinflation. She has been getting allergy immunotherapy for the past 2 years and at this time feels that her breathing is much better.     In addition she has a mild chronic cough and radiographic evidence for at least mild bronchiectasis involving primarily the right middle lobe with some scarring at both lung bases.   She has been doing quite well over the past year.  She did not continue use of the Spiriva.    Past Medical History:   Diagnosis Date   • Bronchiectasis (HCC)    • Bronchitis    • Environmental allergies 7/3/2013   • Hepatitis B carrier (HCC)    • History of pneumothorax    • Hypertension    • Pneumonia    • Positive TB test     treated 1990's   • Tuberculosis    • Tuberculosis exposure        ROS:   Constitutional: Denies fevers, chills, night sweats, fatigue or weight loss  Eyes: Denies vision loss, pain, drainage, double vision  Ears, Nose, Throat: Denies earache, tinnitus, hoarseness  Cardiovascular: Denies chest pain, tightness, palpitations  Respiratory: See HPI  Sleep: Denies, snoring, apnea  GI: Denies abdominal pain, nausea, vomiting, diarrhea  : Denies frequent urination, hematuria,  painful urination  Musculoskeletal: Denies back pain, painful joints, sore muscles  Neurological: Denies headaches, seizures  Skin: Denies rashes, color changes  Psychiatric: Denies depression or thoughts of suicide  Hematologic: Denies bleeding tendency or clotting tendency  Allergic/Immunologic: Denies rhinitis, skin sensitivity    Social History     Social History   • Marital status:      Spouse name: N/A   • Number of children: 3   • Years of education: N/A     Occupational History   • nutrition services      Social History Main Topics   • Smoking status: Never Smoker   • Smokeless tobacco: Never Used   • Alcohol use 0.0 oz/week      Comment: once a month   • Drug use: No   • Sexual activity: Yes     Partners: Male     Other Topics Concern   • Not on file     Social History Narrative   • No narrative on file     Seasonal  Current Outpatient Prescriptions on File Prior to Visit   Medication Sig Dispense Refill   • escitalopram (LEXAPRO) 5 MG tablet Take 1 Tab by mouth every day. 30 Tab 4   • losartan (COZAAR) 50 MG Tab TAKE 1/2 TABLET BY MOUTH IN THE MORNING THEN TAKE 1 TABLET IN THE EVENING 135 Tab 1   • budesonide-formoterol (SYMBICORT) 160-4.5 MCG/ACT Aerosol Inhale 2 Puffs by mouth 2 Times a Day. Rinse mouth after each use 1 Inhaler 5   • cetirizine (ZYRTEC) 10 MG Tab Take 10 mg by mouth every day.     • fluticasone (FLONASE) 50 MCG/ACT nasal spray Spray 1 Spray in nose 2 times a day. 1 Bottle 3   • Multiple Vitamin (MULTIVITAMIN) capsule Take 1 Cap by mouth every day. 100 Cap 0   • Cholecalciferol (VITAMIN D) 2000 UNIT TABS Take 2 Tabs by mouth every day. 30 Tab 0   • aspirin (ASA) 81 MG CHEW chewable tablet Take 1 Tab by mouth every day. 100 Tab 11   • Omega-3 Fatty Acids (FISH OIL) 1000 MG CAPS capsule Take 1 Cap by mouth 3 times a day, with meals. 90 Cap 0   • loratadine (CLARITIN) 10 MG TABS Take 10 mg by mouth every day.     • Tiotropium Bromide Monohydrate (SPIRIVA RESPIMAT) 2.5 MCG/ACT Aero  "Soln Inhale 2 Inhalation by mouth every day. Two inhalations once daily.  Assemble and prime. 60 Inhaler 11   • ibuprofen (MOTRIN) 800 MG TABS Take 1 Tab by mouth every 8 hours as needed for Mild Pain. 90 Tab 3     No current facility-administered medications on file prior to visit.      Blood pressure 118/80, pulse 70, temperature 36.9 °C (98.4 °F), temperature source Oral, resp. rate 14, height 1.575 m (5' 2\"), weight 66.7 kg (147 lb), SpO2 98 %, not currently breastfeeding.  Family History   Problem Relation Age of Onset   • Heart Disease Mother    • Diabetes Mother    • Heart Disease Father    • Hypertension Sister    • Other Sister         1 sister - kidney transplant   • Diabetes Sister    • Diabetes Brother         1 brother       Physical Exam:    HEENT: PERRLA, EOMI, no scleral icterus, no nasal or oral lesions  Neck: No thyromegaly, no adenopathy, no bruits  Mallampatti: Grade II  Lungs: Equal breath sounds, no wheezes or crackles  Heart: Regular rate and rhythm, no gallops or murmurs  Abdomen: Soft, benign, no organomegaly  Extremities: No clubbing, cyanosis, or edema  Neurologic: Cranial nerve, motor, and sensory exam are normal    1. Mild intermittent asthma without complication    2. Bronchiectasis without complication (HCC)    3. History of tuberculosis        She will continue Symbicort 80/4.5, 2 puffs twice daily via spacer and then rinse.  She does have the higher dose still available and will use just 1 puff twice daily.  We will make no changes in her current therapy.  She will continue to follow-up with .  At this time I seen no reason to regularly repeat her CT scan.  "

## 2018-10-15 NOTE — LETTER
Demian Beach M.D.  [facility] Pulmonary Medicine   [address] [place] [address] [place]  [place] [address]  Phone: [phone] - Fax: [phone]           Encounter Date: 10/15/2018  Provider: Demian Beach M.D.  Location of Care: [facility] PULMONARY MEDICINE      Patient:   Dayan Sanchez   MR Number: [id number]   YOB: 1960     PROGRESS NOTE:  Chief Complaint   Patient presents with   • Follow-Up     Asthma       HPI:  The patient is a 58-year-old woman.  She has a remote history of tuberculosis some 30 years ago, treated in [place] with medications over a period of a year.  She has a history of chronic asthma, followed by Dr. Haines.  She had been on Symbicort 160/4.5 at 2 puffs twice a day in addition to an albuterol metered-dose inhaler.  Her Symbicort dose has been decreased to 80/4.5.  At this time her symptoms are stable with chronic cough that produces only occasional amounts of clear sputum without purulence or hemoptysis.  She enjoys reasonable exercise tolerance and is not awakening at night with respiratory symptoms.  Recent pulmonary function studies demonstrated some decrease in mid maximal expiratory flow rates and some possible hyperinflation. She has been getting allergy immunotherapy for the past 2 years and at this time feels that her breathing is much better.     In addition she has a mild chronic cough and radiographic evidence for at least mild bronchiectasis involving primarily the right middle lobe with some scarring at both lung bases.   She has been doing quite well over the past year.  She did not continue use of the Spiriva.    Past Medical History:   Diagnosis Date   • Bronchiectasis (HCC)    • Bronchitis    • Environmental allergies 7/3/2013   • Hepatitis B carrier (HCC)    • History of pneumothorax    • Hypertension    • Pneumonia    • Positive TB test     treated 1990's   • Tuberculosis    • Tuberculosis exposure        ROS:      Constitutional: Denies fevers, chills, night sweats, fatigue or weight loss  Eyes: Denies vision loss, pain, drainage, double vision  Ears, Nose, Throat: Denies earache, tinnitus, hoarseness  Cardiovascular: Denies chest pain, tightness, palpitations  Respiratory: See HPI  Sleep: Denies, snoring, apnea  GI: Denies abdominal pain, nausea, vomiting, diarrhea  : Denies frequent urination, hematuria, painful urination  Musculoskeletal: Denies back pain, painful joints, sore muscles  Neurological: Denies headaches, seizures  Skin: Denies rashes, color changes  Psychiatric: Denies depression or thoughts of suicide  Hematologic: Denies bleeding tendency or clotting tendency  Allergic/Immunologic: Denies rhinitis, skin sensitivity    Social History     Social History   • Marital status:      Spouse name: N/A   • Number of children: 3   • Years of education: N/A     Occupational History   • nutrition services      Social History Main Topics   • Smoking status: Never Smoker   • Smokeless tobacco: Never Used   • Alcohol use 0.0 oz/week      Comment: once a month   • Drug use: No   • Sexual activity: Yes     Partners: Male     Other Topics Concern   • Not on file     Social History Narrative   • No narrative on file     Seasonal  Current Outpatient Prescriptions on File Prior to Visit   Medication Sig Dispense Refill   • escitalopram (LEXAPRO) 5 MG tablet Take 1 Tab by mouth every day. 30 Tab 4   • losartan (COZAAR) 50 MG Tab TAKE 1/2 TABLET BY MOUTH IN THE MORNING THEN TAKE 1 TABLET IN THE EVENING 135 Tab 1   • budesonide-formoterol (SYMBICORT) 160-4.5 MCG/ACT Aerosol Inhale 2 Puffs by mouth 2 Times a Day. Rinse mouth after each use 1 Inhaler 5   • cetirizine (ZYRTEC) 10 MG Tab Take 10 mg by mouth every day.     • fluticasone (FLONASE) 50 MCG/ACT nasal spray Spray 1 Spray in nose 2 times a day. 1 Bottle 3   • Multiple Vitamin (MULTIVITAMIN) capsule Take 1 Cap by mouth every day. 100 Cap 0   • Cholecalciferol  "(VITAMIN D) 2000 UNIT TABS Take 2 Tabs by mouth every day. 30 Tab 0   • aspirin (ASA) 81 MG CHEW chewable tablet Take 1 Tab by mouth every day. 100 Tab 11   • Omega-3 Fatty Acids (FISH OIL) 1000 MG CAPS capsule Take 1 Cap by mouth 3 times a day, with meals. 90 Cap 0   • loratadine (CLARITIN) 10 MG TABS Take 10 mg by mouth every day.     • Tiotropium Bromide Monohydrate (SPIRIVA RESPIMAT) 2.5 MCG/ACT Aero Soln Inhale 2 Inhalation by mouth every day. Two inhalations once daily.  Assemble and prime. 60 Inhaler 11   • ibuprofen (MOTRIN) 800 MG TABS Take 1 Tab by mouth every 8 hours as needed for Mild Pain. 90 Tab 3     No current facility-administered medications on file prior to visit.      Blood pressure 118/80, pulse 70, temperature 36.9 °C (98.4 °F), temperature source Oral, resp. rate 14, height 1.575 m (5' 2\"), weight 66.7 kg (147 lb), SpO2 98 %, not currently breastfeeding.  Family History   Problem Relation Age of Onset   • Heart Disease Mother    • Diabetes Mother    • Heart Disease Father    • Hypertension Sister    • Other Sister         1 sister - kidney transplant   • Diabetes Sister    • Diabetes Brother         1 brother       Physical Exam:    HEENT: PERRLA, EOMI, no scleral icterus, no nasal or oral lesions  Neck: No thyromegaly, no adenopathy, no bruits  Mallampatti: Grade II  Lungs: Equal breath sounds, no wheezes or crackles  Heart: Regular rate and rhythm, no gallops or murmurs  Abdomen: Soft, benign, no organomegaly  Extremities: No clubbing, cyanosis, or edema  Neurologic: Cranial nerve, motor, and sensory exam are normal    1. Mild intermittent asthma without complication    2. Bronchiectasis without complication (HCC)    3. History of tuberculosis        She will continue Symbicort 80/4.5, 2 puffs twice daily via spacer and then rinse.  She does have the higher dose still available and will use just 1 puff twice daily.  We will make no changes in her current therapy.  She will continue to " follow-up with .  At this time I seen no reason to regularly repeat her CT scan.      Electronically signed by Demian Beach M.D.  on 10/15/18    No Recipients

## 2018-10-16 ENCOUNTER — TELEPHONE (OUTPATIENT)
Dept: MEDICAL GROUP | Facility: PHYSICIAN GROUP | Age: 58
End: 2018-10-16

## 2018-10-16 NOTE — TELEPHONE ENCOUNTER
Pt spouse came in and dropped off [facility] paperwork on my desk and left before I could ask any questions. He said  was expecting the paperwork. I left the envelope on MA's desk

## 2018-10-16 NOTE — TELEPHONE ENCOUNTER
"· [place] paperwork received from [facility]  requiring provider signature.     · All appropriate fields completed by Medical Assistant: Yes    · Paperwork placed in \"MA to Provider\" folder/basket. Awaiting provider completion/signature.    "

## 2018-10-20 ENCOUNTER — HOSPITAL ENCOUNTER (OUTPATIENT)
Dept: LAB | Facility: MEDICAL CENTER | Age: 58
End: 2018-10-20
Attending: FAMILY MEDICINE
Payer: COMMERCIAL

## 2018-10-20 DIAGNOSIS — R79.89 LOW TSH LEVEL: ICD-10-CM

## 2018-10-20 LAB
T3FREE SERPL-MCNC: 4.73 PG/ML (ref 2.4–4.2)
T4 FREE SERPL-MCNC: 1.09 NG/DL (ref 0.53–1.43)
THYROPEROXIDASE AB SERPL-ACNC: <0.2 IU/ML (ref 0–9)
TSH SERPL DL<=0.005 MIU/L-ACNC: 0.83 UIU/ML (ref 0.38–5.33)

## 2018-10-20 PROCEDURE — 84439 ASSAY OF FREE THYROXINE: CPT

## 2018-10-20 PROCEDURE — 86376 MICROSOMAL ANTIBODY EACH: CPT

## 2018-10-20 PROCEDURE — 84443 ASSAY THYROID STIM HORMONE: CPT

## 2018-10-20 PROCEDURE — 84481 FREE ASSAY (FT-3): CPT

## 2018-10-20 PROCEDURE — 36415 COLL VENOUS BLD VENIPUNCTURE: CPT

## 2018-10-20 PROCEDURE — 86800 THYROGLOBULIN ANTIBODY: CPT

## 2018-10-22 ENCOUNTER — TELEPHONE (OUTPATIENT)
Dept: MEDICAL GROUP | Facility: PHYSICIAN GROUP | Age: 58
End: 2018-10-22

## 2018-10-22 LAB — THYROGLOB AB SERPL-ACNC: <0.9 IU/ML (ref 0–4)

## 2018-10-25 ENCOUNTER — APPOINTMENT (OUTPATIENT)
Dept: RADIOLOGY | Facility: MEDICAL CENTER | Age: 58
End: 2018-10-25
Attending: INTERNAL MEDICINE
Payer: COMMERCIAL

## 2018-10-25 ENCOUNTER — APPOINTMENT (OUTPATIENT)
Dept: RADIOLOGY | Facility: MEDICAL CENTER | Age: 58
End: 2018-10-25
Attending: EMERGENCY MEDICINE
Payer: COMMERCIAL

## 2018-10-25 ENCOUNTER — HOSPITAL ENCOUNTER (OUTPATIENT)
Facility: MEDICAL CENTER | Age: 58
End: 2018-10-25
Attending: EMERGENCY MEDICINE | Admitting: INTERNAL MEDICINE
Payer: COMMERCIAL

## 2018-10-25 VITALS
RESPIRATION RATE: 18 BRPM | HEIGHT: 62 IN | SYSTOLIC BLOOD PRESSURE: 125 MMHG | WEIGHT: 147.93 LBS | OXYGEN SATURATION: 96 % | HEART RATE: 67 BPM | TEMPERATURE: 97.7 F | BODY MASS INDEX: 27.22 KG/M2 | DIASTOLIC BLOOD PRESSURE: 70 MMHG

## 2018-10-25 DIAGNOSIS — R42 VERTIGO: ICD-10-CM

## 2018-10-25 DIAGNOSIS — R20.2 PARESTHESIA: ICD-10-CM

## 2018-10-25 PROBLEM — F41.8 DEPRESSION WITH ANXIETY: Status: ACTIVE | Noted: 2018-10-25

## 2018-10-25 PROBLEM — R07.9 CHEST PAIN: Status: RESOLVED | Noted: 2018-10-25 | Resolved: 2018-10-25

## 2018-10-25 PROBLEM — R29.90 STROKE-LIKE SYMPTOMS: Status: ACTIVE | Noted: 2018-10-25

## 2018-10-25 PROBLEM — R07.9 CHEST PAIN: Status: ACTIVE | Noted: 2018-10-25

## 2018-10-25 PROBLEM — R29.90 STROKE-LIKE SYMPTOMS: Status: RESOLVED | Noted: 2018-10-25 | Resolved: 2018-10-25

## 2018-10-25 LAB
ALBUMIN SERPL BCP-MCNC: 4.9 G/DL (ref 3.2–4.9)
ALBUMIN/GLOB SERPL: 1.3 G/DL
ALP SERPL-CCNC: 77 U/L (ref 30–99)
ALT SERPL-CCNC: 24 U/L (ref 2–50)
ANION GAP SERPL CALC-SCNC: 6 MMOL/L (ref 0–11.9)
APTT PPP: 27.1 SEC (ref 24.7–36)
AST SERPL-CCNC: 27 U/L (ref 12–45)
BASOPHILS # BLD AUTO: 0.9 % (ref 0–1.8)
BASOPHILS # BLD: 0.06 K/UL (ref 0–0.12)
BILIRUB SERPL-MCNC: 0.7 MG/DL (ref 0.1–1.5)
BUN SERPL-MCNC: 16 MG/DL (ref 8–22)
CALCIUM SERPL-MCNC: 9.5 MG/DL (ref 8.4–10.2)
CHLORIDE SERPL-SCNC: 106 MMOL/L (ref 96–112)
CO2 SERPL-SCNC: 27 MMOL/L (ref 20–33)
CREAT SERPL-MCNC: 0.77 MG/DL (ref 0.5–1.4)
EOSINOPHIL # BLD AUTO: 0.29 K/UL (ref 0–0.51)
EOSINOPHIL NFR BLD: 4.4 % (ref 0–6.9)
ERYTHROCYTE [DISTWIDTH] IN BLOOD BY AUTOMATED COUNT: 38.4 FL (ref 35.9–50)
EST. AVERAGE GLUCOSE BLD GHB EST-MCNC: 114 MG/DL
GLOBULIN SER CALC-MCNC: 3.9 G/DL (ref 1.9–3.5)
GLUCOSE BLD-MCNC: 96 MG/DL (ref 65–99)
GLUCOSE SERPL-MCNC: 100 MG/DL (ref 65–99)
HBA1C MFR BLD: 5.6 % (ref 0–5.6)
HCT VFR BLD AUTO: 45.4 % (ref 37–47)
HGB BLD-MCNC: 15.4 G/DL (ref 12–16)
IMM GRANULOCYTES # BLD AUTO: 0.01 K/UL (ref 0–0.11)
IMM GRANULOCYTES NFR BLD AUTO: 0.2 % (ref 0–0.9)
INR PPP: 0.91 (ref 0.87–1.13)
LYMPHOCYTES # BLD AUTO: 1.68 K/UL (ref 1–4.8)
LYMPHOCYTES NFR BLD: 25.3 % (ref 22–41)
MCH RBC QN AUTO: 30.3 PG (ref 27–33)
MCHC RBC AUTO-ENTMCNC: 33.9 G/DL (ref 33.6–35)
MCV RBC AUTO: 89.4 FL (ref 81.4–97.8)
MONOCYTES # BLD AUTO: 0.44 K/UL (ref 0–0.85)
MONOCYTES NFR BLD AUTO: 6.6 % (ref 0–13.4)
NEUTROPHILS # BLD AUTO: 4.17 K/UL (ref 2–7.15)
NEUTROPHILS NFR BLD: 62.6 % (ref 44–72)
NRBC # BLD AUTO: 0 K/UL
NRBC BLD-RTO: 0 /100 WBC
PLATELET # BLD AUTO: 295 K/UL (ref 164–446)
PMV BLD AUTO: 9.1 FL (ref 9–12.9)
POTASSIUM SERPL-SCNC: 4 MMOL/L (ref 3.6–5.5)
PROT SERPL-MCNC: 8.8 G/DL (ref 6–8.2)
PROTHROMBIN TIME: 12.2 SEC (ref 12–14.6)
RBC # BLD AUTO: 5.08 M/UL (ref 4.2–5.4)
SODIUM SERPL-SCNC: 139 MMOL/L (ref 135–145)
TROPONIN I SERPL-MCNC: <0.02 NG/ML (ref 0–0.04)
TROPONIN I SERPL-MCNC: <0.02 NG/ML (ref 0–0.04)
WBC # BLD AUTO: 6.7 K/UL (ref 4.8–10.8)

## 2018-10-25 PROCEDURE — 96374 THER/PROPH/DIAG INJ IV PUSH: CPT

## 2018-10-25 PROCEDURE — 700105 HCHG RX REV CODE 258: Performed by: INTERNAL MEDICINE

## 2018-10-25 PROCEDURE — 80053 COMPREHEN METABOLIC PANEL: CPT

## 2018-10-25 PROCEDURE — 85025 COMPLETE CBC W/AUTO DIFF WBC: CPT

## 2018-10-25 PROCEDURE — 71045 X-RAY EXAM CHEST 1 VIEW: CPT

## 2018-10-25 PROCEDURE — 99285 EMERGENCY DEPT VISIT HI MDM: CPT

## 2018-10-25 PROCEDURE — 70450 CT HEAD/BRAIN W/O DYE: CPT

## 2018-10-25 PROCEDURE — 70551 MRI BRAIN STEM W/O DYE: CPT

## 2018-10-25 PROCEDURE — G8978 MOBILITY CURRENT STATUS: HCPCS | Mod: CI

## 2018-10-25 PROCEDURE — G8980 MOBILITY D/C STATUS: HCPCS | Mod: CI

## 2018-10-25 PROCEDURE — 70498 CT ANGIOGRAPHY NECK: CPT

## 2018-10-25 PROCEDURE — 700102 HCHG RX REV CODE 250 W/ 637 OVERRIDE(OP): Performed by: INTERNAL MEDICINE

## 2018-10-25 PROCEDURE — G0378 HOSPITAL OBSERVATION PER HR: HCPCS

## 2018-10-25 PROCEDURE — 700111 HCHG RX REV CODE 636 W/ 250 OVERRIDE (IP): Performed by: INTERNAL MEDICINE

## 2018-10-25 PROCEDURE — 36415 COLL VENOUS BLD VENIPUNCTURE: CPT

## 2018-10-25 PROCEDURE — 85610 PROTHROMBIN TIME: CPT

## 2018-10-25 PROCEDURE — 83036 HEMOGLOBIN GLYCOSYLATED A1C: CPT

## 2018-10-25 PROCEDURE — A9270 NON-COVERED ITEM OR SERVICE: HCPCS | Performed by: EMERGENCY MEDICINE

## 2018-10-25 PROCEDURE — 93005 ELECTROCARDIOGRAM TRACING: CPT | Performed by: EMERGENCY MEDICINE

## 2018-10-25 PROCEDURE — 84484 ASSAY OF TROPONIN QUANT: CPT

## 2018-10-25 PROCEDURE — 700117 HCHG RX CONTRAST REV CODE 255: Performed by: EMERGENCY MEDICINE

## 2018-10-25 PROCEDURE — G8979 MOBILITY GOAL STATUS: HCPCS | Mod: CI

## 2018-10-25 PROCEDURE — 700102 HCHG RX REV CODE 250 W/ 637 OVERRIDE(OP): Performed by: EMERGENCY MEDICINE

## 2018-10-25 PROCEDURE — 82962 GLUCOSE BLOOD TEST: CPT

## 2018-10-25 PROCEDURE — 70496 CT ANGIOGRAPHY HEAD: CPT

## 2018-10-25 PROCEDURE — 99219 PR INITIAL OBSERVATION CARE,LEVL II: CPT | Performed by: INTERNAL MEDICINE

## 2018-10-25 PROCEDURE — 97161 PT EVAL LOW COMPLEX 20 MIN: CPT

## 2018-10-25 PROCEDURE — A9270 NON-COVERED ITEM OR SERVICE: HCPCS | Performed by: INTERNAL MEDICINE

## 2018-10-25 PROCEDURE — 85730 THROMBOPLASTIN TIME PARTIAL: CPT

## 2018-10-25 RX ORDER — BUDESONIDE AND FORMOTEROL FUMARATE DIHYDRATE 160; 4.5 UG/1; UG/1
2 AEROSOL RESPIRATORY (INHALATION) 2 TIMES DAILY
Status: DISCONTINUED | OUTPATIENT
Start: 2018-10-25 | End: 2018-10-25 | Stop reason: HOSPADM

## 2018-10-25 RX ORDER — ASPIRIN 600 MG/1
300 SUPPOSITORY RECTAL DAILY
Status: DISCONTINUED | OUTPATIENT
Start: 2018-10-26 | End: 2018-10-25 | Stop reason: HOSPADM

## 2018-10-25 RX ORDER — LORATADINE 10 MG/1
10 TABLET ORAL DAILY
Status: DISCONTINUED | OUTPATIENT
Start: 2018-10-25 | End: 2018-10-25 | Stop reason: HOSPADM

## 2018-10-25 RX ORDER — ASPIRIN 81 MG/1
324 TABLET, CHEWABLE ORAL DAILY
Status: DISCONTINUED | OUTPATIENT
Start: 2018-10-26 | End: 2018-10-25 | Stop reason: HOSPADM

## 2018-10-25 RX ORDER — ASPIRIN 81 MG/1
324 TABLET, CHEWABLE ORAL ONCE
Status: COMPLETED | OUTPATIENT
Start: 2018-10-25 | End: 2018-10-25

## 2018-10-25 RX ORDER — LABETALOL HYDROCHLORIDE 5 MG/ML
10 INJECTION, SOLUTION INTRAVENOUS EVERY 4 HOURS PRN
Status: DISCONTINUED | OUTPATIENT
Start: 2018-10-25 | End: 2018-10-25 | Stop reason: HOSPADM

## 2018-10-25 RX ORDER — ONDANSETRON 4 MG/1
4 TABLET, ORALLY DISINTEGRATING ORAL EVERY 4 HOURS PRN
Status: DISCONTINUED | OUTPATIENT
Start: 2018-10-25 | End: 2018-10-25 | Stop reason: HOSPADM

## 2018-10-25 RX ORDER — ACETAMINOPHEN 500 MG
1000 TABLET ORAL EVERY 8 HOURS PRN
COMMUNITY

## 2018-10-25 RX ORDER — CHLORAL HYDRATE 500 MG
1000 CAPSULE ORAL DAILY
COMMUNITY

## 2018-10-25 RX ORDER — SODIUM CHLORIDE 9 MG/ML
INJECTION, SOLUTION INTRAVENOUS CONTINUOUS
Status: DISCONTINUED | OUTPATIENT
Start: 2018-10-25 | End: 2018-10-25 | Stop reason: HOSPADM

## 2018-10-25 RX ORDER — PROMETHAZINE HYDROCHLORIDE 25 MG/1
12.5-25 SUPPOSITORY RECTAL EVERY 4 HOURS PRN
Status: DISCONTINUED | OUTPATIENT
Start: 2018-10-25 | End: 2018-10-25 | Stop reason: HOSPADM

## 2018-10-25 RX ORDER — ESCITALOPRAM OXALATE 10 MG/1
5 TABLET ORAL DAILY
Status: DISCONTINUED | OUTPATIENT
Start: 2018-10-25 | End: 2018-10-25 | Stop reason: HOSPADM

## 2018-10-25 RX ORDER — POLYETHYLENE GLYCOL 3350 17 G/17G
1 POWDER, FOR SOLUTION ORAL
Status: DISCONTINUED | OUTPATIENT
Start: 2018-10-25 | End: 2018-10-25 | Stop reason: HOSPADM

## 2018-10-25 RX ORDER — LOSARTAN POTASSIUM 25 MG/1
25 TABLET ORAL 2 TIMES DAILY
Status: DISCONTINUED | OUTPATIENT
Start: 2018-10-25 | End: 2018-10-25 | Stop reason: HOSPADM

## 2018-10-25 RX ORDER — ASPIRIN 325 MG
325 TABLET ORAL DAILY
Status: DISCONTINUED | OUTPATIENT
Start: 2018-10-26 | End: 2018-10-25 | Stop reason: HOSPADM

## 2018-10-25 RX ORDER — AMOXICILLIN 250 MG
2 CAPSULE ORAL 2 TIMES DAILY
Status: DISCONTINUED | OUTPATIENT
Start: 2018-10-25 | End: 2018-10-25 | Stop reason: HOSPADM

## 2018-10-25 RX ORDER — ATORVASTATIN CALCIUM 40 MG/1
80 TABLET, FILM COATED ORAL EVERY EVENING
Status: DISCONTINUED | OUTPATIENT
Start: 2018-10-25 | End: 2018-10-25 | Stop reason: HOSPADM

## 2018-10-25 RX ORDER — ACETAMINOPHEN 500 MG
1000 TABLET ORAL EVERY 8 HOURS PRN
Status: DISCONTINUED | OUTPATIENT
Start: 2018-10-25 | End: 2018-10-25 | Stop reason: HOSPADM

## 2018-10-25 RX ORDER — MULTIVIT-MIN/IRON/FOLIC ACID/K 18-600-40
2000 CAPSULE ORAL DAILY
COMMUNITY

## 2018-10-25 RX ORDER — BISACODYL 10 MG
10 SUPPOSITORY, RECTAL RECTAL
Status: DISCONTINUED | OUTPATIENT
Start: 2018-10-25 | End: 2018-10-25 | Stop reason: HOSPADM

## 2018-10-25 RX ORDER — PROMETHAZINE HYDROCHLORIDE 25 MG/1
12.5-25 TABLET ORAL EVERY 4 HOURS PRN
Status: DISCONTINUED | OUTPATIENT
Start: 2018-10-25 | End: 2018-10-25 | Stop reason: HOSPADM

## 2018-10-25 RX ORDER — ONDANSETRON 2 MG/ML
4 INJECTION INTRAMUSCULAR; INTRAVENOUS EVERY 4 HOURS PRN
Status: DISCONTINUED | OUTPATIENT
Start: 2018-10-25 | End: 2018-10-25 | Stop reason: HOSPADM

## 2018-10-25 RX ORDER — LORAZEPAM 2 MG/ML
1 INJECTION INTRAMUSCULAR ONCE
Status: COMPLETED | OUTPATIENT
Start: 2018-10-25 | End: 2018-10-25

## 2018-10-25 RX ADMIN — ASPIRIN 81 MG 324 MG: 81 TABLET ORAL at 08:38

## 2018-10-25 RX ADMIN — SODIUM CHLORIDE: 9 INJECTION, SOLUTION INTRAVENOUS at 09:00

## 2018-10-25 RX ADMIN — ESCITALOPRAM OXALATE 5 MG: 10 TABLET ORAL at 10:56

## 2018-10-25 RX ADMIN — LORAZEPAM 1 MG: 2 INJECTION INTRAMUSCULAR; INTRAVENOUS at 15:55

## 2018-10-25 RX ADMIN — IOHEXOL 100 ML: 350 INJECTION, SOLUTION INTRAVENOUS at 07:15

## 2018-10-25 RX ADMIN — LORATADINE 10 MG: 10 TABLET ORAL at 10:55

## 2018-10-25 RX ADMIN — BUDESONIDE AND FORMOTEROL FUMARATE DIHYDRATE 2 PUFF: 160; 4.5 AEROSOL RESPIRATORY (INHALATION) at 13:26

## 2018-10-25 ASSESSMENT — ENCOUNTER SYMPTOMS
WEAKNESS: 0
FEVER: 0
SHORTNESS OF BREATH: 0
CHILLS: 0
SENSORY CHANGE: 1
CONSTIPATION: 0
DIZZINESS: 1
HEADACHES: 1
DEPRESSION: 0
ABDOMINAL PAIN: 0
VOMITING: 0
FALLS: 0
MYALGIAS: 0
SPUTUM PRODUCTION: 0
STRIDOR: 0
NECK PAIN: 1
DIARRHEA: 0
TINGLING: 0
PALPITATIONS: 0
NAUSEA: 0
COUGH: 0
LOSS OF CONSCIOUSNESS: 0

## 2018-10-25 ASSESSMENT — PATIENT HEALTH QUESTIONNAIRE - PHQ9
1. LITTLE INTEREST OR PLEASURE IN DOING THINGS: NOT AT ALL
2. FEELING DOWN, DEPRESSED, IRRITABLE, OR HOPELESS: NOT AT ALL
SUM OF ALL RESPONSES TO PHQ9 QUESTIONS 1 AND 2: 0

## 2018-10-25 ASSESSMENT — GAIT ASSESSMENTS
GAIT LEVEL OF ASSIST: SUPERVISED
DISTANCE (FEET): 150

## 2018-10-25 NOTE — ASSESSMENT & PLAN NOTE
-Not cardiac in nature, it is reproducible with palpation  -Musculoskeletal in nature  -Possibly also a component of anxiety  -Trend troponins, EKG is not concerning

## 2018-10-25 NOTE — ED NOTES
Med Rec completed per patient  Allergies reviewed  No ORAL antibiotics in last 30 days    Patient stated she had a full tablet of Losartan 10-25-18 instead of a half   Gets allergy shots every 5 weeks

## 2018-10-25 NOTE — H&P
Hospital Medicine History & Physical Note    Date of Service  10/25/2018    Primary Care Physician  Noni Jansen M.D.    Consultants  None    Code Status  Full    Chief Complaint  Vertigo    History of Presenting Illness  58 y.o. female who presented 10/25/2018 with vertigo.  Patient states she had sudden onset last night, she had just finished washing dishes after dinner, states it felt like the house was moving, initially she thought there was an earthquake.  She states this lasted approximately 5 seconds.  Patient then later in the evening had trouble sleeping, also noted some left-sided chest pain as well as some numbness and tingling on her left side.  This morning she woke with a headache.  She took a Tylenol, took a shower then ate and it drove to work.  While she was driving she had another episode where she thought the steering wheel was spinning.  Prior to that she did think a truck was going to hit her which caused her to become stressed, she slowed way down.  Patient does admit to left arm and leg numbness, intermittent.  Patient has also noted increased stress at work.  She does have a family history of both stroke and heart attack however this is in family members that smoked and drank alcohol.  Patient states she is never had anything happen like this before.  Patient states her chest pain is located on the left, mild in nature, no provoking or alleviating factors, reproducible with palpation.    Review of Systems  Review of Systems   Constitutional: Negative for chills, fever and malaise/fatigue.   HENT: Negative for congestion.    Respiratory: Negative for cough, sputum production, shortness of breath and stridor.    Cardiovascular: Positive for chest pain. Negative for palpitations and leg swelling.   Gastrointestinal: Negative for abdominal pain, constipation, diarrhea, nausea and vomiting.   Genitourinary: Negative for dysuria and urgency.   Musculoskeletal: Positive for neck pain. Negative for  falls and myalgias.   Neurological: Positive for dizziness, sensory change and headaches. Negative for tingling, loss of consciousness and weakness.   Psychiatric/Behavioral: Negative for depression and suicidal ideas.   All other systems reviewed and are negative.      Past Medical History   has a past medical history of Bronchiectasis (HCC); Bronchitis; Environmental allergies (7/3/2013); Hepatitis B carrier (HCC); History of pneumothorax; Hypertension; Pneumonia; Positive TB test; Tuberculosis; and Tuberculosis exposure.    Surgical History   has a past surgical history that includes liver biopsy; tubal ligation; and cataract extraction with iol.     Family History  family history includes Diabetes in her brother, mother, and sister; Heart Disease in her father and mother; Hypertension in her sister; Other in her sister.  Stroke in her brother and father    Social History   reports that she has never smoked. She has never used smokeless tobacco. She reports that she drinks alcohol. She reports that she does not use drugs.    Allergies  Allergies   Allergen Reactions   • Seasonal        Medications  Prior to Admission Medications   Prescriptions Last Dose Informant Patient Reported? Taking?   Cholecalciferol (VITAMIN D) 2000 units Cap 10/24/2018 at AM Patient Yes Yes   Sig: Take 2,000 Units by mouth every day.   Multiple Vitamin (MULTIVITAMIN) capsule 10/24/2018 at AM Patient No No   Sig: Take 1 Cap by mouth every day.   Omega-3 Fatty Acids (FISH OIL) 1000 MG Cap capsule 10/24/2018 at AM Patient Yes Yes   Sig: Take 1,000 mg by mouth every day.   acetaminophen (TYLENOL) 500 MG Tab 10/25/2018 at AM Patient Yes Yes   Sig: Take 1,000 mg by mouth every 8 hours as needed.   aspirin (ASA) 81 MG CHEW chewable tablet 10/24/2018 at AM Patient No No   Sig: Take 1 Tab by mouth every day.   budesonide-formoterol (SYMBICORT) 160-4.5 MCG/ACT Aerosol Taking Patient No No   Sig: Inhale 2 Puffs by mouth 2 Times a Day. Rinse mouth  after each use   escitalopram (LEXAPRO) 5 MG tablet 10/24/2018 at AM Patient No No   Sig: Take 1 Tab by mouth every day.   fluticasone (FLONASE) 50 MCG/ACT nasal spray 10/24/2018 at PM Patient No No   Sig: Spray 1 Spray in nose 2 times a day.   loratadine (CLARITIN) 10 MG TABS 10/24/2018 at AM Patient Yes No   Sig: Take 10 mg by mouth every day.   losartan (COZAAR) 50 MG Tab 10/25/2018 at AM Patient No No   Sig: TAKE 1/2 TABLET BY MOUTH IN THE MORNING THEN TAKE 1 TABLET IN THE EVENING      Facility-Administered Medications: None       Physical Exam  Temp:  [36.2 °C (97.1 °F)] 36.2 °C (97.1 °F)  Pulse:  [78-81] 78  Resp:  [15-18] 15  BP: (140)/(90) 140/90    Physical Exam   Constitutional: She is oriented to person, place, and time. She appears well-developed. She is cooperative. No distress.   HENT:   Head: Normocephalic and atraumatic. Not macrocephalic and not microcephalic. Head is without raccoon's eyes and without Pierre's sign.   Mouth/Throat: Oropharynx is clear and moist. No oropharyngeal exudate.   Eyes: Right eye exhibits no discharge. Left eye exhibits no discharge.   Neck: Neck supple. No tracheal deviation present.   Cardiovascular: Normal rate, regular rhythm and intact distal pulses.  Exam reveals no gallop, no distant heart sounds and no friction rub.    No murmur heard.  Pulmonary/Chest: Effort normal and breath sounds normal. No accessory muscle usage or stridor. No tachypnea and no bradypnea. No respiratory distress. She has no wheezes. She has no rales. She exhibits no tenderness.   Abdominal: Soft. Bowel sounds are normal. She exhibits no distension. There is no splenomegaly or hepatomegaly. There is no tenderness. There is no rebound and no guarding.   Musculoskeletal: Normal range of motion. She exhibits no edema or tenderness.   Lymphadenopathy:     She has no cervical adenopathy.   Neurological: She is alert and oriented to person, place, and time. No cranial nerve deficit. She displays no  seizure activity.   Skin: Skin is warm, dry and intact. No rash noted. She is not diaphoretic. No erythema. No pallor.   Psychiatric: She has a normal mood and affect. Her speech is normal and behavior is normal. Judgment and thought content normal. Cognition and memory are normal.   Nursing note and vitals reviewed.      Laboratory:  Recent Labs      10/25/18   0647   WBC  6.7   RBC  5.08   HEMOGLOBIN  15.4   HEMATOCRIT  45.4   MCV  89.4   MCH  30.3   MCHC  33.9   RDW  38.4   PLATELETCT  295   MPV  9.1     Recent Labs      10/25/18   0647   SODIUM  139   POTASSIUM  4.0   CHLORIDE  106   CO2  27   GLUCOSE  100*   BUN  16   CREATININE  0.77   CALCIUM  9.5     Recent Labs      10/25/18   0647   ALTSGPT  24   ASTSGOT  27   ALKPHOSPHAT  77   TBILIRUBIN  0.7   GLUCOSE  100*     Recent Labs      10/25/18   0647   APTT  27.1   INR  0.91             Recent Labs      10/25/18   0647   TROPONINI  <0.02       Urinalysis:    No results found     Imaging:  DX-CHEST-PORTABLE (1 VIEW)   Final Result      No acute cardiopulmonary abnormality.      CT-CTA NECK WITH & W/O-POST PROCESSING   Final Result      CT angiogram of the neck within normal limits.      CT-CTA HEAD WITH & W/O-POST PROCESS   Final Result      CT angiogram of the [place] within normal limits.      CT-HEAD W/O   Final Result      Normal CT scan of the head without contrast.               INTERPRETING LOCATION:  [address] [place] [address], [id number]      MR-BRAIN-W/O    (Results Pending)         Assessment/Plan:  I anticipate this patient is appropriate for observation status at this time.    Stroke-like symptoms   Assessment & Plan    -Symptoms currently resolved  -No need for permissive hypertension  -I think stroke is less likely, I think this is more likely to be stress/anxiety related  -Vertigo was only short lasting however patient is anxious about it, will obtain MRI of the brain for further evaluation  -Patient does understand and wants to proceed with MRI  brain        Depression with anxiety   Assessment & Plan    -Continue Lexapro  -Ativan will be given for MRI  -May need additional medications for anxiety however would advise against driving if Xanax was taken  -Anxiety and increased stress is likely the cause of her        Chest pain   Assessment & Plan    -Not cardiac in nature, it is reproducible with palpation  -Musculoskeletal in nature  -Possibly also a component of anxiety  -Trend troponins, EKG is not concerning        Hypertension- (present on admission)   Assessment & Plan    -Controlled with losartan, continue  -Place PRN labetalol  -No need for permissive hypertension at this time            VTE prophylaxis: SCDs

## 2018-10-25 NOTE — THERAPY
"Physical Therapy Evaluation completed.   Bed Mobility:  Supine to Sit: Supervised  Transfers: Sit to Stand: Supervised  Gait: Level Of Assist: Supervised with No Equipment Needed       Plan of Care: Patient with no further skilled PT needs in the acute care setting at this time  Discharge Recommendations: Equipment: No Equipment Needed. Post-acute therapy Discharge to home with outpatient or home health for additional skilled therapy services.    See \"Rehab Therapy-Acute\" Patient Summary Report for complete documentation.   Pt is a 58 y.o.f. who presented to the hospital with c/o 2 incidence of dizziness/vertigo this date.  The pt also c/o tingling in her left UE and chest.  Pt states she has experienced tingling in her low back , but this has been relieved by a chiropractic visit.  The pt has seen a chiropractor for her neck too, but she never had the tingling before.  Pt presents with increased muscle tightness in her upper back and neck.  She demonstrated guarding of her neck and maintained the right scapula in elevation in stance.  Pt educated in gentle chair stretch for her upper back and neck. Pt educated in shoulder rolls, scapular squeezes, and use of a small ball to decrease muscle spasms in her upper back.  Pt stated she was feeling better following therapy session.  Pt's  was present during therapy.  Discussed benefit of good posture and good upper back strength.  Pt demonstrated understanding of exercises.  Pt would benefit from cont skilled PT in OPPT following d/c to progress with exercise program.  Pt has no further skilled PT needs in acute care setting.  "

## 2018-10-25 NOTE — ED NOTES
Instructed to call Code stroke by Dr. Gupta.  However, she also stated pt's symptoms were resolving and non-reproducible and that she didn't need the whole team down here.  Pt would also not need tele-neuro.  CT called for Stat CT- EKG done, IV started and labs drawn and sent to lab. NIH score done by RN

## 2018-10-25 NOTE — ED NOTES
"Chief Complaint   Patient presents with   • Dizziness   • Numbness       Reports near syncope last evening.  Pt reports dizziness and numbness to upper extremities this morning.     /90   Pulse 81   Temp 36.2 °C (97.1 °F)   Resp 18   Ht 1.575 m (5' 2\")   Wt 67.1 kg (147 lb 14.9 oz)   BMI 27.06 kg/m²       "

## 2018-10-25 NOTE — ASSESSMENT & PLAN NOTE
-Continue Lexapro  -Ativan will be given for MRI  -May need additional medications for anxiety however would advise against driving if Xanax was taken  -Anxiety and increased stress is likely the cause of her

## 2018-10-25 NOTE — ED NOTES
On preparation for admission, this pt states that she has no symptoms now. The episodes described were that of room spinning, she is under a lot of stress at work and is planning to go out on a stress leave. 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

## 2018-10-25 NOTE — ASSESSMENT & PLAN NOTE
-Symptoms currently resolved  -No need for permissive hypertension  -I think stroke is less likely, I think this is more likely to be stress/anxiety related  -Vertigo was only short lasting however patient is anxious about it, will obtain MRI of the brain for further evaluation  -Patient does understand and wants to proceed with MRI brain

## 2018-10-25 NOTE — THERAPY
Occupational Therapy Contact Note    OT order received. Per RN, pt is up supervised with nursing. Per screen with pt and spouse, pt reports no ADL deficits, waiting on test results regarding intermittent dizziness. Pt does not appear to have OT needs in this setting at this time. Full OT evaluation not completed based on nursing recommendation and patient denial of need. Please re-order should pt's condition change.    Christina Lott, OTR/L

## 2018-10-25 NOTE — ED NOTES
Assumed care, all labs and imaging is done at this time. Pt was up to the bathroom, she is steady and alert with her  at the bedside. No complaints now. BS done by Too was 96.

## 2018-10-25 NOTE — ED PROVIDER NOTES
"ED Provider Note    CHIEF COMPLAINT  Chief Complaint   Patient presents with   • Dizziness   • Numbness       HPI  Dayan Sanchez is a 58 y.o. female who ambulates to the emergency department with her  through triage for dizziness and left-sided numbness.  Patient states dizziness began last night after dinner while she was doing the dishes, she called her  to hold her because she felt as though she was going to pass out.  Patient describes dizziness, the room was spinning around her.  She was able to drink some water and get assisted to a seated position and got better.  She did not sleep well overnight but got up this morning and was driving to work when she had another episode of \"the car caved in on me,\"\" then the steering will caved in.\"  Patient describes spinning sensation again.  She was able to get to the parking lot at work but felt again as though she might fall or pass out and was assisted by another employee inside before calling her  for help.  Recurrent spinning feeling began at 0345 this morning, associated with left-sided headache and neck pain as well.  Patient states while waiting at work for her  she began having left-sided numbness and tingling, denies focal weakness but does describe an intermittent \"heavy\" of her arm and leg.  No slurred speech.  No facial droop.  No chest pain, palpitations or shortness of breath.  No back pain or abdominal pain.  No nausea or vomiting.    Patient denies history of similar symptoms.  She states she has had increased stress at work and is being treated with Lexapro for anxiety and depression over the last month but states \"things have gotten better.\"    REVIEW OF SYSTEMS  See HPI for further details. All other systems are negative.     PAST MEDICAL HISTORY   has a past medical history of Bronchiectasis (HCC); Bronchitis; Environmental allergies (7/3/2013); Hepatitis B carrier (HCC); History of pneumothorax; Hypertension; " "Pneumonia; Positive TB test; Tuberculosis; and Tuberculosis exposure.    SOCIAL HISTORY  Social History     Social History Main Topics   • Smoking status: Never Smoker   • Smokeless tobacco: Never Used   • Alcohol use 0.0 oz/week      Comment: once a month   • Drug use: No   • Sexual activity: Yes     Partners: Male       SURGICAL HISTORY   has a past surgical history that includes liver biopsy; tubal ligation; and cataract extraction with iol.    CURRENT MEDICATIONS  Home Medications    **Home medications have not yet been reviewed for this encounter**         ALLERGIES  Allergies   Allergen Reactions   • Seasonal        PHYSICAL EXAM  VITAL SIGNS: /90   Pulse 81   Temp 36.2 °C (97.1 °F)   Resp 18   Ht 1.575 m (5' 2\")   Wt 67.1 kg (147 lb 14.9 oz)   BMI 27.06 kg/m²   Pulse ox interpretation:I interpret this pulse ox as normal.  Constitutional: Alert in no apparent distress.  HENT: Normocephalic, atraumatic. Bilateral external ears normal, Nose normal. Moist mucous membranes.    Eyes: Pupils are equal and reactive, Conjunctiva normal.  EOMI.  No nystagmus.  Neck: Normal range of motion, Supple.  No meningismus.  Reproducible discomfort with palpation of the left paravertebral cervical musculature that extends across the trapezius muscles.  Lymphatic: No lymphadenopathy noted.   Cardiovascular: Regular rate and rhythm, no murmurs. Distal pulses intact.  No peripheral edema.  Thorax & Lungs: Normal breath sounds.  No wheezing/rales/ronchi. No increased work of breathing  Abdomen: Soft, non-distended, non-tender to palpation. No palpable or pulsatile masses.  Skin: Warm, Dry, No erythema, No rash.   Musculoskeletal: Good range of motion in all major joints.   Neurologic: Alert and oriented x4.  Speech clear and cohesive.  No facial droop.  Cranial nerves II through XII intact bilaterally.  5 out of 5 strength in 4 extremities.  Gait stable independently without ataxia.  Straight leg raise intact " bilaterally, no leg drift.  No pronator drift.  Finger to nose intact bilaterally.  Psychiatric: Flat affect otherwise, Judgment normal, Mood normal.       DIAGNOSTIC STUDIES / PROCEDURES    EKG  I interpreted this EKG myself.  This is a 12-lead study.  The rhythm is sinus with a rate of 76.  There are no ST segment abnormalities.  Poor R wave progression.  Nonspecific T wave changes inferiorly, Q waves inferiorly.  Normal intervals.  Interpretation: No ST segment elevation myocardial infarction.  No ectopy.  No arrhythmia.    LABS  Results for orders placed or performed during the hospital encounter of 10/25/18   CBC WITH DIFFERENTIAL   Result Value Ref Range    WBC 6.7 4.8 - 10.8 K/uL    RBC 5.08 4.20 - 5.40 M/uL    Hemoglobin 15.4 12.0 - 16.0 g/dL    Hematocrit 45.4 37.0 - 47.0 %    MCV 89.4 81.4 - 97.8 fL    MCH 30.3 27.0 - 33.0 pg    MCHC 33.9 33.6 - 35.0 g/dL    RDW 38.4 35.9 - 50.0 fL    Platelet Count 295 164 - 446 K/uL    MPV 9.1 9.0 - 12.9 fL    Neutrophils-Polys 62.60 44.00 - 72.00 %    Lymphocytes 25.30 22.00 - 41.00 %    Monocytes 6.60 0.00 - 13.40 %    Eosinophils 4.40 0.00 - 6.90 %    Basophils 0.90 0.00 - 1.80 %    Immature Granulocytes 0.20 0.00 - 0.90 %    Nucleated RBC 0.00 /100 WBC    Neutrophils (Absolute) 4.17 2.00 - 7.15 K/uL    Lymphs (Absolute) 1.68 1.00 - 4.80 K/uL    Monos (Absolute) 0.44 0.00 - 0.85 K/uL    Eos (Absolute) 0.29 0.00 - 0.51 K/uL    Baso (Absolute) 0.06 0.00 - 0.12 K/uL    Immature Granulocytes (abs) 0.01 0.00 - 0.11 K/uL    NRBC (Absolute) 0.00 K/uL   COMP METABOLIC PANEL   Result Value Ref Range    Sodium 139 135 - 145 mmol/L    Potassium 4.0 3.6 - 5.5 mmol/L    Chloride 106 96 - 112 mmol/L    Co2 27 20 - 33 mmol/L    Anion Gap 6.0 0.0 - 11.9    Glucose 100 (H) 65 - 99 mg/dL    Bun 16 8 - 22 mg/dL    Creatinine 0.77 0.50 - 1.40 mg/dL    Calcium 9.5 8.4 - 10.2 mg/dL    AST(SGOT) 27 12 - 45 U/L    ALT(SGPT) 24 2 - 50 U/L    Alkaline Phosphatase 77 30 - 99 U/L    Total  Bilirubin 0.7 0.1 - 1.5 mg/dL    Albumin 4.9 3.2 - 4.9 g/dL    Total Protein 8.8 (H) 6.0 - 8.2 g/dL    Globulin 3.9 (H) 1.9 - 3.5 g/dL    A-G Ratio 1.3 g/dL   TROPONIN   Result Value Ref Range    Troponin I <0.02 0.00 - 0.04 ng/mL   PROTHROMBIN TIME   Result Value Ref Range    PT 12.2 12.0 - 14.6 sec    INR 0.91 0.87 - 1.13   APTT   Result Value Ref Range    APTT 27.1 24.7 - 36.0 sec   ESTIMATED GFR   Result Value Ref Range    GFR If African American >60 >60 mL/min/1.73 m 2    GFR If Non African American >60 >60 mL/min/1.73 m 2   ACCU-CHEK GLUCOSE   Result Value Ref Range    Glucose - Accu-Ck 96 65 - 99 mg/dL   EKG (NOW)   Result Value Ref Range    Report       [facility] Emergency Dept.    Test Date:  2018-10-25  Pt Name:    JACEY CHRISTIE              Department: [facility]  MRN:        [id number]                      [facility]  Gender:     Female                       Technician:   :        1960                   Requested By:MISSY BAXTER  Order #:    [id number]                    Reading MD:    Measurements  Intervals                                Axis  Rate:       76                           P:          61  FL:         157                          QRS:        -8  QRSD:       82                           T:          33  QT:         386  QTc:        435    Interpretive Statements  Sinus rhythm  Low voltage, precordial leads  Abnormal R-wave progression, early transition  No previous ECG available for comparison         RADIOLOGY  DX-CHEST-PORTABLE (1 VIEW)   Final Result      No acute cardiopulmonary abnormality.      CT-CTA NECK WITH & W/O-POST PROCESSING   Final Result      CT angiogram of the neck within normal limits.      CT-CTA HEAD WITH & W/O-POST PROCESS   Final Result      CT angiogram of the [place] within normal limits.      CT-HEAD W/O   Final Result      Normal CT scan of the head without contrast.               INTERPRETING LOCATION:  [address]  [place], [address]          COURSE & MEDICAL DECISION MAKING  Nursing notes and vital signs were reviewed. (See chart for details)  The patients records were reviewed, history was obtained from the patient and her ;    ED evaluation for vertigo and left-sided paresthesias is unrevealing, however symptomatology is concerning for TIA.  A cerebellar process or insult has not been entirely excluded either.  However, patient's left-sided numbness and heaviness resolved before my exam was complete.  NIH is 0 on multiple evaluations..  She is not a TPA candidate.  Initial evaluation is unremarkable, labs are within normal limits as are the CT head, CTA head and neck.  Blood pressure is well controlled and has improved without treatment since arrival.  Patient denies chest pain or true syncope at any time.  EKG within normal limits, first troponin negative.  Doubt ACS.  Aspirin was given.  She will be admitted to the hospitalist for further evaluation and treatment.  Patient and her  are aware the findings and agreeable to the disposition and plan .    8:05 AM Dr. Liang is aware the patient agreeable to admission.    The total critical care time on this patient is 40 minutes, continues hemodynamic monitoring multiple bedside evaluations and neurologic checks, resuscitating patient, speaking with admitting physician, and deciphering test results, and arranging hospital admission. This 40 minutes is exclusive of separately billable procedures.    FINAL IMPRESSION  (R42) Vertigo  (R20.2) Paresthesia      Electronically signed by: Hanh Gupta, 10/25/2018 7:51 AM      This dictation was created using voice recognition software. The accuracy of the dictation is limited to the abilities of the software. I expect there may be some errors of grammar and possibly content. The nursing notes were reviewed and certain aspects of this information were incorporated into this note.

## 2018-10-25 NOTE — ASSESSMENT & PLAN NOTE
-Controlled with losartan, continue  -Place PRN labetalol  -No need for permissive hypertension at this time

## 2018-10-25 NOTE — ED NOTES
[place] fall assessment completed. Pt is High risk for fall. Interventions complete. Pt placed in yellow non slip socks, wrist band placed, green sign on door. Bed locked in low position, call light in place. Personal possessions in place. Personal needs assessed. Charge nurse notified to move pt to a more visible room if available. Safety assessed. Will monitor frequently.

## 2018-10-26 NOTE — DISCHARGE INSTRUCTIONS
Discharge Instructions    Discharged to home by car with relative. Discharged via wheelchair, hospital escort: Yes.  Special equipment needed: Not Applicable    Be sure to schedule a follow-up appointment with your primary care doctor or any specialists as instructed.     Discharge Plan:   Diet Plan: Discussed  Activity Level: Discussed  Confirmed Follow up Appointment: Appointment Scheduled  Confirmed Symptoms Management: Discussed  Medication Reconciliation Updated: Yes    I understand that a diet low in cholesterol, fat, and sodium is recommended for good health. Unless I have been given specific instructions below for another diet, I accept this instruction as my diet prescription.   Other diet: as previously prescribed    Special Instructions: None    · Is patient discharged on Warfarin / Coumadin?   No       Stroke Prevention  Some health problems and behaviors may make it more likely for you to have a stroke. Below are ways to lessen your risk of having a stroke.  · Be active for at least 30 minutes on most or all days.  · Do not smoke. Try not to be around others who smoke.  · Do not drink too much alcohol.  ¨ Do not have more than 2 drinks a day if you are a man.  ¨ Do not have more than 1 drink a day if you are a woman and are not pregnant.  · Eat healthy foods, such as fruits and vegetables. If you were put on a specific diet, follow the diet as told.  · Keep your cholesterol levels under control through diet and medicines. Look for foods that are low in saturated fat, trans fat, cholesterol, and are high in fiber.  · If you have diabetes, follow all diet plans and take your medicine as told.  · Ask your doctor if you need treatment to lower your blood pressure. If you have high blood pressure (hypertension), follow all diet plans and take your medicine as told by your doctor.  · If you are 18-39 years old, have your blood pressure checked every 3-5 years. If you are age 40 or older, have your blood  pressure checked every year.  · Keep a healthy weight. Eat foods that are low in calories, salt, saturated fat, trans fat, and cholesterol.  · Do not take drugs.  · Avoid birth control pills, if this applies. Talk to your doctor about the risks of taking birth control pills.  · Talk to your doctor if you have sleep problems (sleep apnea).  · Take all medicine as told by your doctor.  ¨ You may be told to take aspirin or blood thinner medicine. Take this medicine as told by your doctor.  ¨ Understand your medicine instructions.  · Make sure any other conditions you have are being taken care of.  Get help right away if:  · You suddenly lose feeling (you feel numb) or have weakness in your face, arm, or leg.  · Your face or eyelid hangs down to one side.  · You suddenly feel confused.  · You have trouble talking (aphasia) or understanding what people are saying.  · You suddenly have trouble seeing in one or both eyes.  · You suddenly have trouble walking.  · You are dizzy.  · You lose your balance or your movements are clumsy (uncoordinated).  · You suddenly have a very bad headache and you do not know the cause.  · You have new chest pain.  · Your heart feels like it is fluttering or skipping a beat (irregular heartbeat).  Do not wait to see if the symptoms above go away. Get help right away. Call your local emergency services (911 in [place] Do not drive yourself to the hospital.   This information is not intended to replace advice given to you by your health care provider. Make sure you discuss any questions you have with your health care provider.  Document Released: 06/18/2013 Document Revised: 05/25/2017 Document Reviewed: 06/20/2014  [employer] Interactive Patient Education © 2017 [employer].        Vertigo  Introduction  Vertigo means that you feel like you are moving when you are not. Vertigo can also make you feel like things around you are moving when they are not. This feeling can come and go at any time.  Vertigo often goes away on its own.  Follow these instructions at home:  · Avoid making fast movements.  · Avoid driving.  · Avoid using heavy machinery.  · Avoid doing any task or activity that might cause danger to you or other people if you would have a vertigo attack while you are doing it.  · Sit down right away if you feel dizzy or have trouble with your balance.  · Take over-the-counter and prescription medicines only as told by your doctor.  · Follow instructions from your doctor about which positions or movements you should avoid.  · Drink enough fluid to keep your pee (urine) clear or pale yellow.  · Keep all follow-up visits as told by your doctor. This is important.  Contact a doctor if:  · Medicine does not help your vertigo.  · You have a fever.  · Your problems get worse or you have new symptoms.  · Your family or friends see changes in your behavior.  · You feel sick to your stomach (nauseous) or you throw up (vomit).  · You have a “pins and needles” feeling or you are numb in part of your body.  Get help right away if:  · You have trouble moving or talking.  · You are always dizzy.  · You pass out (faint).  · You get very bad headaches.  · You feel weak or have trouble using your hands, arms, or legs.  · You have changes in your hearing.  · You have changes in your seeing (vision).  · You get a stiff neck.  · Bright light starts to bother you.  This information is not intended to replace advice given to you by your health care provider. Make sure you discuss any questions you have with your health care provider.  Document Released: 09/26/2009 Document Revised: 05/25/2017 Document Reviewed: 04/11/2016  © 2017 Elsevier      Depression / Suicide Risk    As you are discharged from this [facility] facility, it is important to learn how to keep safe from harming yourself.    Recognize the warning signs:  · Abrupt changes in personality, positive or negative- including increase in energy   · Giving away  possessions  · Change in eating patterns- significant weight changes-  positive or negative  · Change in sleeping patterns- unable to sleep or sleeping all the time   · Unwillingness or inability to communicate  · Depression  · Unusual sadness, discouragement and loneliness  · Talk of wanting to die  · Neglect of personal appearance   · Rebelliousness- reckless behavior  · Withdrawal from people/activities they love  · Confusion- inability to concentrate     If you or a loved one observes any of these behaviors or has concerns about self-harm, here's what you can do:  · Talk about it- your feelings and reasons for harming yourself  · Remove any means that you might use to hurt yourself (examples: pills, rope, extension cords, firearm)  · Get professional help from the community (Mental Health, Substance Abuse, psychological counseling)  · Do not be alone:Call your Safe Contact- someone whom you trust who will be there for you.  · Call your local CRISIS HOTLINE 993-1667 or 717-070-2162  · Call your local Children's Mobile Crisis Response Team [place] (640) 363-1929 or www.[employer]  · Call the toll free National Suicide Prevention Hotlines   · National Suicide Prevention Lifeline 914-553-HSPU (8552)  · National Hope Line Network 800-SUICIDE (686-7215)

## 2018-10-26 NOTE — DISCHARGE SUMMARY
Discharge Summary    CHIEF COMPLAINT ON ADMISSION  Chief Complaint   Patient presents with   • Dizziness   • Numbness       Reason for Admission  Syncope, Numbness     Admission Date  10/25/2018    CODE STATUS  Prior    HPI & HOSPITAL COURSE  This is a 58 y.o. female here with vertigo.  Patient states this happened twice prior to admission.  The first time she was washing dishes and felt like the house was moving, initially she thought there was an earthquake.  She states both episodes were short lasting, a matter of seconds.  Patient states the next time she was driving to work and she thought the steering wheel was spinning.  She has been under increased stress at work, just prior to the steering well and said that she also thought the truck was going to hit her.  She also complained of some left-sided numbness and tingling, upper extremity and lower extremity during the night as well as some chest pain.  Chest pain was left upper chest, reproducible with palpation.  Upon arrival, patient stated the chest pain was not very significant, this was deemed to be musculoskeletal only since it was reproducible with palpation.  She did have troponins which remained negative as well as an EKG that showed no concerning changes, was monitored on telemetry.  At this point in time I do not feel her chest pain is concerning considering its reproducible and she has been under increased stress.  She was very concerned though about the vertigo, because of this an MRI brain was obtained.  MRI brain was normal, no acute changes.  Patient has had resolution of her symptoms, have not noticed anything on telemetry monitoring.  She has been explained the results of the MRI and is anxious to leave the hospital.  At this point in time I think she has approached her baseline, I do not think any of her symptoms were worrisome.       Therefore, she is discharged in good and stable condition to home with close outpatient  follow-up.      Discharge Date  10/25/2018    FOLLOW UP ITEMS POST DISCHARGE  Follow-up with primary care provider within 1 week  Emergency department or 911 in case of emergency    DISCHARGE DIAGNOSES  Active Problems:    Hypertension POA: Yes    Depression with anxiety POA: Unknown  Resolved Problems:    Stroke-like symptoms POA: Unknown    Chest pain POA: Unknown      FOLLOW UP  Future Appointments  Date Time Provider Department Center   11/21/2018 7:30 AM [address]   11/26/2018 2:00 PM Barbi Addison L.C[facility] OP [address]   12/11/2018 2:40 PM Noni Jansen M.D. [facility] None   10/14/2019 2:30 PM [facility] None     Noni Jansen M.D.  [address]  [place] [address]  [phone]            MEDICATIONS ON DISCHARGE     Medication List      CONTINUE taking these medications      Instructions   acetaminophen 500 MG Tabs  Commonly known as:  TYLENOL   Take 1,000 mg by mouth every 8 hours as needed.  Dose:  1000 mg     aspirin 81 MG Chew chewable tablet  Commonly known as:  ASA   Take 1 Tab by mouth every day.  Dose:  81 mg     budesonide-formoterol 160-4.5 MCG/ACT Aero  Commonly known as:  SYMBICORT   Inhale 2 Puffs by mouth 2 Times a Day. Rinse mouth after each use  Dose:  2 Puff     escitalopram 5 MG tablet  Commonly known as:  LEXAPRO   Take 1 Tab by mouth every day.  Dose:  5 mg     fish oil 1000 MG Caps capsule   Take 1,000 mg by mouth every day.  Dose:  1000 mg     fluticasone 50 MCG/ACT nasal spray  Commonly known as:  FLONASE   Spray 1 Spray in nose 2 times a day.  Dose:  1 Spray     loratadine 10 MG Tabs  Commonly known as:  CLARITIN   Take 10 mg by mouth every day.  Dose:  10 mg     losartan 50 MG Tabs  Commonly known as:  COZAAR   TAKE 1/2 TABLET BY MOUTH IN THE MORNING THEN TAKE 1 TABLET IN THE EVENING     multivitamin capsule   Take 1 Cap by mouth every day.  Dose:  1 Cap     Vitamin D 2000 units Caps   Take 2,000 Units by mouth every day.  Dose:  2000  Units            Allergies  Allergies   Allergen Reactions   • Seasonal    • Clavulanic Acid Hives and Nausea       DIET  No orders of the defined types were placed in this encounter.      ACTIVITY  As tolerated.  Weight bearing as tolerated    CONSULTATIONS  None    PROCEDURES  None    LABORATORY  Lab Results   Component Value Date    SODIUM 139 10/25/2018    POTASSIUM 4.0 10/25/2018    CHLORIDE 106 10/25/2018    CO2 27 10/25/2018    GLUCOSE 100 (H) 10/25/2018    BUN 16 10/25/2018    CREATININE 0.77 10/25/2018    CREATININE 0.72 03/20/2013        Lab Results   Component Value Date    WBC 6.7 10/25/2018    WBC 6.8 03/20/2013    HEMOGLOBIN 15.4 10/25/2018    HEMATOCRIT 45.4 10/25/2018    PLATELETCT 295 10/25/2018

## 2018-11-07 ENCOUNTER — OFFICE VISIT (OUTPATIENT)
Dept: MEDICAL GROUP | Facility: PHYSICIAN GROUP | Age: 58
End: 2018-11-07
Payer: COMMERCIAL

## 2018-11-07 VITALS
BODY MASS INDEX: 27.18 KG/M2 | WEIGHT: 147.71 LBS | OXYGEN SATURATION: 96 % | TEMPERATURE: 97.2 F | HEIGHT: 62 IN | DIASTOLIC BLOOD PRESSURE: 70 MMHG | HEART RATE: 82 BPM | SYSTOLIC BLOOD PRESSURE: 136 MMHG

## 2018-11-07 DIAGNOSIS — R07.89 ATYPICAL CHEST PAIN: ICD-10-CM

## 2018-11-07 DIAGNOSIS — R42 DIZZINESS: ICD-10-CM

## 2018-11-07 PROCEDURE — 99214 OFFICE O/P EST MOD 30 MIN: CPT | Performed by: FAMILY MEDICINE

## 2018-11-07 NOTE — LETTER
November 7, 2018         Patient: Dayan Sanchez   YOB: 1960   Date of Visit: 11/7/2018           To Whom it May Concern:    Dayan Sanchez was seen in my clinic on 11/7/2018.    Because of dizziness, I recommend that patient does not work at the fast [employer] table from 11/8/2018 to 12/11/2018.        Sincerely,           Noni Jansen M.D.  Electronically Signed

## 2018-11-08 NOTE — PROGRESS NOTES
"Subjective:      Dayan Sanchez is a 58 y.o. female who presents with Follow-Up            HPI   Patient returns for follow-up after recent hospitalization at [facility] for dizziness and atypical chest pain.      Troponins were negative x2.  EKG for interpretation showed sinus rhythm, low voltage, early transition, abnormal R wave progression.  No EKG image available.    She had labs which all came back within normal limits.    She had imaging studies with normal chest x-ray, CAT scan of the head without abnormalities, MRI of the brain without any abnormalities, CTA of the neck and the head without any abnormalities.    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  She would like to have a letter to excuse her from working at this stable.    Past medical history, past surgical history, family history reviewed-no changes    Social history reviewed-no changes    Allergies reviewed-no changes    Medications reviewed-no changes      ROS     Per HPI, the rest are negative.       Objective:     /70 (BP Location: Left arm, Patient Position: Sitting, BP Cuff Size: Adult)   Pulse 82   Temp 36.2 °C (97.2 °F) (Temporal)   Ht 1.575 m (5' 2\")   Wt 67 kg (147 lb 11.3 oz)   SpO2 96%   BMI 27.02 kg/m²      Physical Exam     Examined alert, awake, oriented, not in distress    HEENT-ATNC, positive PERRLA, no nystagmus, EOMs intact, tympanic membranes intact, no nasal discharge, no tonsillar pharyngeal injection, tongue at midline, no facial droop  Neck-supple, no lymphadenopathy, no thyromegaly  Lungs-clear to auscultation, no rales, no wheezes  Heart-regular rate and rhythm, no murmur  Extremities-no edema, clubbing, cyanosis  Neuro exam-grossly intact, negative Romberg       I reviewed hospital records.     Assessment/Plan:     1. Dizziness     2. Atypical chest pain       Workup " done in the hospital did not show any acute findings including imaging studies of the brain and the neck.  She was ruled out for acute coronary syndrome.  Most likely the dizziness is from working 4 hours in front of our rapid spinning table.  The chest pain is atypical in character.  Note was written that she is excused and not recommended to work at the rapid spinning table for 1 month until I see her again on 12/11/18.    Patient was seen for 25 minutes face to face of which > 50% of appointment time was spent on counseling and coordination of care regarding the above.      Please note that this dictation was created using voice recognition software. I have worked with consultants from the vendor as well as technical experts from [facility] to optimize the interface. I have made every reasonable attempt to correct obvious errors, but I expect that there are errors of grammar and possibly content I did not discover before finalizing the note.

## 2018-11-11 LAB — EKG IMPRESSION: NORMAL

## 2018-11-21 ENCOUNTER — HOSPITAL ENCOUNTER (OUTPATIENT)
Dept: RADIOLOGY | Facility: MEDICAL CENTER | Age: 58
End: 2018-11-21
Attending: PHYSICIAN ASSISTANT
Payer: COMMERCIAL

## 2018-11-21 DIAGNOSIS — B18.1 HEPATITIS B CARRIER (HCC): ICD-10-CM

## 2018-11-21 PROCEDURE — 76705 ECHO EXAM OF ABDOMEN: CPT

## 2018-11-26 ENCOUNTER — OFFICE VISIT (OUTPATIENT)
Dept: BEHAVIORAL HEALTH | Facility: CLINIC | Age: 58
End: 2018-11-26
Payer: COMMERCIAL

## 2018-11-26 DIAGNOSIS — F43.23 SITUATIONAL MIXED ANXIETY AND DEPRESSIVE DISORDER: ICD-10-CM

## 2018-11-26 PROCEDURE — 90791 PSYCH DIAGNOSTIC EVALUATION: CPT | Performed by: SOCIAL WORKER
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
## 2018-12-11 ENCOUNTER — OFFICE VISIT (OUTPATIENT)
Dept: MEDICAL GROUP | Facility: PHYSICIAN GROUP | Age: 58
End: 2018-12-11
Payer: COMMERCIAL

## 2018-12-11 VITALS
SYSTOLIC BLOOD PRESSURE: 110 MMHG | TEMPERATURE: 97.4 F | WEIGHT: 147.05 LBS | HEART RATE: 86 BPM | OXYGEN SATURATION: 98 % | HEIGHT: 62 IN | DIASTOLIC BLOOD PRESSURE: 60 MMHG | BODY MASS INDEX: 27.06 KG/M2

## 2018-12-11 DIAGNOSIS — R42 DIZZINESS: ICD-10-CM

## 2018-12-11 DIAGNOSIS — F43.23 SITUATIONAL MIXED ANXIETY AND DEPRESSIVE DISORDER: ICD-10-CM

## 2018-12-11 PROCEDURE — 99213 OFFICE O/P EST LOW 20 MIN: CPT | Performed by: FAMILY MEDICINE

## 2018-12-11 NOTE — PROGRESS NOTES
Subjective:      Dayan Sanchez is a 58 y.o. female who presents with Depression        HPI:  The patient presents today for follow up after previously being seen for depression and anxiety. She was seen by me on 10/11/2018 for increased stress due to a legal process occurring at work. She was prescribed Lexapro and referred to [facility] for counseling. There she was diagnosed with situational mixed anxiety and depressive disorder. She adds she is feeling much improved after taking Lexapro. There is some resolution to her previous work related issues, but it is not quite completed.  She has to go through investigation and hearing.  The psychologist at [facility] agrees with her taking Lexapro, and the patient feels she has had positive results from this treatment. She will have another session in a month.  She feels more hopeful, and she would not like to increase her dosage.    She was having issues with vertigo for which she saw an ENT specialist, Dr. Moore. He is having her try a no-caffeine diet, and in 2 weeks she will do further testing which I suppose is the ENG.  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  It is not quite as bad as the past circular conveyor belt, but she would still like another letter to continue this accomodation.  She said she would like me to include in the letter that she cannot work in the linear conveyor belt as well.  She says she has an appointment in 2 weeks with the ENT specialist to attempt vertigo manipulation. He also prescribed her with Valium 2 mg to take at night PRN for dizziness.        Past medical history, past surgical history, family history reviewed-no changes    Social history reviewed-no changes    Allergies reviewed-no changes    Medications reviewed-no changes      ROS:  As per the HPI as shown above, the rest are negative.       Objective:     /60 (BP Location: Left arm, Patient Position:  "Sitting, BP Cuff Size: Adult)   Pulse 86   Temp 36.3 °C (97.4 °F) (Temporal)   Ht 1.575 m (5' 2\")   Wt 66.7 kg (147 lb 0.8 oz)   SpO2 98%   BMI 26.90 kg/m²     Physical Exam   Examined alert, awake, oriented, not in distress     HEENT-ATNC, positive PERRLA, EOMs intact, no nystagmus, tympanic membranes intact bilaterally without evidence of infection, no nasal discharge, no facial droop, tongue midline, no tonsillar pharyngeal injection  Neck-supple, no lymphadenopathy, no thyromegaly  Lungs-clear to auscultation, no rales, no wheezes  Heart-regular rate and rhythm, no murmur  Extremities-no edema, clubbing, cyanosis  Neuro exam-grossly intact         Assessment/Plan:   1. 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  She is currently being followed by a ENT specialist, Dr. Moore. They are currently waiting on two tests before furthering treatment.  She was prescribed Valium by the ENT specialist to take at night.  She will see him in 2 weeks for vertigo manipulation.    2. Situational mixed anxiety and depressive disorder  Patient states she is feeling much improved after taking Lexapro and speaking with the psychologist at [facility]. She would not like to increase her dosage, so we will maintain the current treatment.          I will be providing the patient with a letter excusing her from working on the \"fast-spinning table and fast line\" secondary to ongoing dizziness.      I, Hugh Pathak (Scribe), am scribing for, and in the presence of, Noni Jansen MD     Electronically signed by: Hugh Pathak (Scribe), 12/11/2018    I, Noni Jansen MD personally performed the services described in this documentation, as scribed by Hugh Pathak in my presence, and it is both accurate and complete.         "

## 2018-12-11 NOTE — LETTER
December 11, 2018         Patient: Dayan Sanchez   YOB: 1960   Date of Visit: 12/11/2018           To Whom it May Concern:    Dayan Sanchez was seen in my clinic on 12/11/2018. 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    If you have any questions or concerns, please don't hesitate to call.        Sincerely,           Noni Jansen M.D.  Electronically Signed

## 2019-01-07 DIAGNOSIS — F32.9 REACTIVE DEPRESSION (SITUATIONAL): ICD-10-CM

## 2019-01-07 DIAGNOSIS — Z56.6 STRESS AT WORK: ICD-10-CM

## 2019-01-07 RX ORDER — ESCITALOPRAM OXALATE 5 MG/1
5 TABLET ORAL DAILY
Qty: 90 TAB | Refills: 1 | Status: SHIPPED | OUTPATIENT
Start: 2019-01-07 | End: 2019-09-19

## 2019-01-07 SDOH — HEALTH STABILITY - MENTAL HEALTH: OTHER PHYSICAL AND MENTAL STRAIN RELATED TO WORK: Z56.6

## 2019-01-07 NOTE — TELEPHONE ENCOUNTER
Was the patient seen in the last year in this department? Yes    Does patient have an active prescription for medications requested? No     Received Request Via: Pharmacy     Request for 90 day

## 2019-01-27 ENCOUNTER — OFFICE VISIT (OUTPATIENT)
Dept: URGENT CARE | Facility: CLINIC | Age: 59
End: 2019-01-27
Payer: COMMERCIAL

## 2019-01-27 VITALS
WEIGHT: 149 LBS | SYSTOLIC BLOOD PRESSURE: 130 MMHG | OXYGEN SATURATION: 92 % | BODY MASS INDEX: 27.42 KG/M2 | DIASTOLIC BLOOD PRESSURE: 70 MMHG | TEMPERATURE: 100.2 F | HEART RATE: 99 BPM | HEIGHT: 62 IN | RESPIRATION RATE: 18 BRPM

## 2019-01-27 DIAGNOSIS — R68.89 FLU-LIKE SYMPTOMS: ICD-10-CM

## 2019-01-27 DIAGNOSIS — J02.9 SORE THROAT: ICD-10-CM

## 2019-01-27 DIAGNOSIS — R05.9 COUGH: ICD-10-CM

## 2019-01-27 LAB
FLUAV+FLUBV AG SPEC QL IA: NORMAL
INT CON NEG: NORMAL
INT CON POS: NORMAL

## 2019-01-27 PROCEDURE — 87804 INFLUENZA ASSAY W/OPTIC: CPT | Performed by: PHYSICIAN ASSISTANT

## 2019-01-27 PROCEDURE — 99214 OFFICE O/P EST MOD 30 MIN: CPT | Performed by: PHYSICIAN ASSISTANT

## 2019-01-27 RX ORDER — OSELTAMIVIR PHOSPHATE 75 MG/1
75 CAPSULE ORAL 2 TIMES DAILY
Qty: 10 CAP | Refills: 0 | Status: SHIPPED | OUTPATIENT
Start: 2019-01-27 | End: 2019-02-01

## 2019-01-27 RX ORDER — PROMETHAZINE HYDROCHLORIDE AND CODEINE PHOSPHATE 6.25; 1 MG/5ML; MG/5ML
5-10 SYRUP ORAL 3 TIMES DAILY PRN
Qty: 150 ML | Refills: 0 | Status: SHIPPED | OUTPATIENT
Start: 2019-01-27 | End: 2019-02-01

## 2019-01-27 RX ORDER — AZITHROMYCIN 250 MG/1
TABLET, FILM COATED ORAL
Qty: 6 TAB | Refills: 1 | Status: SHIPPED | OUTPATIENT
Start: 2019-01-27 | End: 2019-03-15

## 2019-01-27 ASSESSMENT — ENCOUNTER SYMPTOMS
SORE THROAT: 1
MYALGIAS: 1
SHORTNESS OF BREATH: 0
EYES NEGATIVE: 1
CARDIOVASCULAR NEGATIVE: 1
COUGH: 1
FEVER: 1
WHEEZING: 0

## 2019-01-27 NOTE — LETTER
January 27, 2019         Patient: Dayan Sanchez   YOB: 1960   Date of Visit: 1/27/2019           To Whom it May Concern:    Dayan Sanchez was seen in my clinic on 1/27/2019 for acute illness; please excuse MOnday thru Wednesday.     If you have any questions or concerns, please don't hesitate to call.        Sincerely,           Jason Ellsworth P.A.-C.  Electronically Signed

## 2019-01-27 NOTE — PROGRESS NOTES
"Subjective:      Dayan Sanchez is a 59 y.o. female who presents with Cough (x1day, cough, fever, sore throat, headache, body aches, joint pain)            Cough   This is a new (fever, cough, st, aches) problem. The current episode started yesterday. The problem has been unchanged. The problem occurs every few minutes. The cough is non-productive. Associated symptoms include a fever, myalgias and a sore throat. Pertinent negatives include no shortness of breath or wheezing. Nothing aggravates the symptoms. She has tried nothing for the symptoms. The treatment provided no relief. There is no history of asthma.       Review of Systems   Constitutional: Positive for fever.   HENT: Positive for sore throat.         +phar./tons redn     Eyes: Negative.    Respiratory: Positive for cough. Negative for shortness of breath and wheezing.    Cardiovascular: Negative.    Musculoskeletal: Positive for myalgias.   Skin: Negative.           Objective:     /70 (BP Location: Left arm, Patient Position: Sitting, BP Cuff Size: Adult)   Pulse 99   Temp 37.9 °C (100.2 °F) (Temporal)   Resp 18   Ht 1.575 m (5' 2\")   Wt 67.6 kg (149 lb)   SpO2 92%   BMI 27.25 kg/m²      Physical Exam   Constitutional: She is oriented to person, place, and time. She appears well-developed and well-nourished. No distress.   HENT:   Head: Normocephalic and atraumatic.   Mouth/Throat: No oropharyngeal exudate.   +phar./tons redn     Eyes: Pupils are equal, round, and reactive to light. Conjunctivae and EOM are normal.   Neck: Normal range of motion. Neck supple.   Cardiovascular: Normal rate and regular rhythm.    Pulmonary/Chest: Effort normal and breath sounds normal. No respiratory distress. She has no wheezes. She has no rales.   Lymphadenopathy:     She has cervical adenopathy (+submandib. tend adenopathy).   Neurological: She is alert and oriented to person, place, and time.   Skin: Skin is warm and dry. Capillary refill takes " less than 2 seconds.   Psychiatric: She has a normal mood and affect. Her behavior is normal.   Nursing note and vitals reviewed.    Active Ambulatory Problems     Diagnosis Date Noted   • Hypertension 07/11/2011   • Hepatitis B carrier (HCC) 07/11/2011   • Environmental allergies 07/03/2013   • Shoulder pain, left 11/13/2015   • Right foot pain 11/13/2015   • Family history of diabetes mellitus (DM) 01/12/2016   • Abnormal CXR 01/12/2016   • Abnormal thyroid stimulating hormone (TSH) level 01/29/2016   • Abnormal CT scan of lung 01/29/2016   • Depression with anxiety 10/25/2018     Resolved Ambulatory Problems     Diagnosis Date Noted   • Persistent dry cough 01/12/2016   • Stroke-like symptoms 10/25/2018   • Chest pain 10/25/2018     Past Medical History:   Diagnosis Date   • Bronchiectasis (HCC)    • Bronchitis    • Environmental allergies 7/3/2013   • Hepatitis B carrier (HCC)    • History of pneumothorax    • Hypertension    • Pneumonia    • Positive TB test    • Tuberculosis    • Tuberculosis exposure      Current Outpatient Prescriptions on File Prior to Visit   Medication Sig Dispense Refill   • escitalopram (LEXAPRO) 5 MG tablet Take 1 Tab by mouth every day. 90 Tab 1   • Omega-3 Fatty Acids (FISH OIL) 1000 MG Cap capsule Take 1,000 mg by mouth every day.     • Cholecalciferol (VITAMIN D) 2000 units Cap Take 2,000 Units by mouth every day.     • acetaminophen (TYLENOL) 500 MG Tab Take 1,000 mg by mouth every 8 hours as needed.     • losartan (COZAAR) 50 MG Tab TAKE 1/2 TABLET BY MOUTH IN THE MORNING THEN TAKE 1 TABLET IN THE EVENING 135 Tab 1   • budesonide-formoterol (SYMBICORT) 160-4.5 MCG/ACT Aerosol Inhale 2 Puffs by mouth 2 Times a Day. Rinse mouth after each use 1 Inhaler 5   • fluticasone (FLONASE) 50 MCG/ACT nasal spray Spray 1 Spray in nose 2 times a day. 1 Bottle 3   • Multiple Vitamin (MULTIVITAMIN) capsule Take 1 Cap by mouth every day. 100 Cap 0   • aspirin (ASA) 81 MG CHEW chewable tablet Take  1 Tab by mouth every day. 100 Tab 11   • loratadine (CLARITIN) 10 MG TABS Take 10 mg by mouth every day.       No current facility-administered medications on file prior to visit.      Social History     Social History   • Marital status:      Spouse name: N/A   • Number of children: 3   • Years of education: N/A     Occupational History   • nutrition services      Social History Main Topics   • Smoking status: Never Smoker   • Smokeless tobacco: Never Used   • Alcohol use 0.0 oz/week      Comment: once a month   • Drug use: No   • Sexual activity: Yes     Partners: Male     Other Topics Concern   • Not on file     Social History Narrative   • No narrative on file     Family History   Problem Relation Age of Onset   • Heart Disease Mother    • Diabetes Mother    • Heart Disease Father    • Hypertension Sister    • Other Sister         1 sister - kidney transplant   • Diabetes Sister    • Diabetes Brother         1 brother     Seasonal and Clavulanic acid              Assessment/Plan:     ·  flu sx, cough/st      · rx meds; fever control

## 2019-02-04 DIAGNOSIS — J45.20 MILD INTERMITTENT ASTHMA WITHOUT COMPLICATION: ICD-10-CM

## 2019-02-04 RX ORDER — ALBUTEROL SULFATE 90 UG/1
2 AEROSOL, METERED RESPIRATORY (INHALATION) EVERY 6 HOURS PRN
Qty: 1 INHALER | Refills: 2 | Status: SHIPPED | OUTPATIENT
Start: 2019-02-04 | End: 2021-12-08 | Stop reason: SDUPTHER

## 2019-02-25 ENCOUNTER — OFFICE VISIT (OUTPATIENT)
Dept: BEHAVIORAL HEALTH | Facility: CLINIC | Age: 59
End: 2019-02-25
Payer: COMMERCIAL

## 2019-02-25 DIAGNOSIS — F43.23 SITUATIONAL MIXED ANXIETY AND DEPRESSIVE DISORDER: ICD-10-CM

## 2019-02-25 PROCEDURE — 90832 PSYTX W PT 30 MINUTES: CPT | Performed by: SOCIAL WORKER

## 2019-02-25 ASSESSMENT — PATIENT HEALTH QUESTIONNAIRE - PHQ9: CLINICAL INTERPRETATION OF PHQ2 SCORE: 0

## 2019-02-25 NOTE — BH THERAPY
[facility]  Therapy Progress Note    Patient Name: Dayan Sanchez  Patient MRN: [id number]  Today's Date: 2/25/2019     Type of session:Individual psychotherapy  Length of session: 30 minutes  Persons in attendance:Patient    Subjective/New Info: Client apologized for missing her last appointment. She was pretty ill. She stated her mood has improved greatly, as her job has changed and she feels comfortable in her new working environment. Gave client the PHQ 2, did not trigger the PHQ 9. She stated she attended some sports medicine therapy, which has helped with her dizziness spells. She is having those further and further apart. Provided some psychoeducation on anxiety and coping skills. Client currently has no further need for mental health services. Encouraged her to reach out if any symptoms returned. Termination of services.     Objective/Observations:   Participation: Active verbal participation and Engaged   Grooming: Good and Casual   Cognition: Alert and Fully Oriented   Eye contact: Good   Mood: Happy   Affect: Congruent with content   Thought process: Logical and Goal-directed   Speech: Rate within normal limits and Volume within normal limits   Other:     Diagnoses:   1. Situational mixed anxiety and depressive disorder     - in remission      Current risk:   SUICIDE: Not applicable   Homicide: Not applicable   Self-harm: Not applicable   Relapse: Not applicable   Other:    Safety Plan reviewed? Not Indicated   If evidence of imminent risk is present, intervention/plan:     Therapeutic Intervention(s): Supportive psychotherapy    Treatment Goal(s)/Objective(s) addressed: Treatment plan goals addressed today:        - Develop and utilize skills to manage mood and emotional suffering more effectively           Progress toward Treatment Goals: Significant improvement    Plan:  - Termination of therapy   - Patient is in agreement with the above plan:  YES    Barbi Addison,  GARRICK  2/25/2019

## 2019-03-14 ENCOUNTER — OFFICE VISIT (OUTPATIENT)
Dept: MEDICAL GROUP | Facility: PHYSICIAN GROUP | Age: 59
End: 2019-03-14
Payer: COMMERCIAL

## 2019-03-14 VITALS
HEART RATE: 92 BPM | WEIGHT: 147.49 LBS | HEIGHT: 62 IN | SYSTOLIC BLOOD PRESSURE: 110 MMHG | DIASTOLIC BLOOD PRESSURE: 60 MMHG | BODY MASS INDEX: 27.14 KG/M2 | TEMPERATURE: 97.1 F | OXYGEN SATURATION: 94 %

## 2019-03-14 DIAGNOSIS — J45.20 MILD INTERMITTENT ASTHMA WITHOUT COMPLICATION: ICD-10-CM

## 2019-03-14 DIAGNOSIS — I10 ESSENTIAL HYPERTENSION: ICD-10-CM

## 2019-03-14 DIAGNOSIS — F43.23 SITUATIONAL MIXED ANXIETY AND DEPRESSIVE DISORDER: ICD-10-CM

## 2019-03-14 PROCEDURE — 99214 OFFICE O/P EST MOD 30 MIN: CPT | Performed by: FAMILY MEDICINE

## 2019-03-14 NOTE — PROGRESS NOTES
Subjective:      Dayan Sanchez is a 59 y.o. female who presents with Follow-Up        HPI:    Patient is here for follow up on her medical problems.    Situational mixed anxiety and depressive disorder  Continues to take Lexapro and she has been going to [facility] for counseling.  Both medication and counseling are hoping her symptoms.  She was told by a counselor that she does not need to return since she has been doing well.  Investigation is still ongoing with regards to complaints she filed with regards to work discrimination. This has been causing her stress. However, she notes that she was moved to two different schools which has improved her stress levels.    She was previously seen here for issues with vertigo that was precipitated by working on a conveyer belt at her work. At last visit, she was given a note for work stating that she is not to work on any of the conveyer belts. She is no longer working on a conveyer belt at work and feels improved. Patient states the vertigo has now only been occurring occasionally. She is otherwise feeling well overall.    Mild intermittent asthma without complication  Patient has a history of asthma. States she is currently taking Symbicort regularly and albuterol as needed has a rescue inhaler. She has been doing well overall in regards to her asthma. She mentions that she was sick with the flu at the end of January 2019. States this illness lasted for a while. She was evaluated at Urgent Care at that time where she tested positive for flu. She states was prescribed Tamiflu and antibiotics. The flu symptoms have fully resolved since then. She mentions that she sees an allergist once per month.    Essential hypertension  She has a history of hypertension which is chronic in nature and stable. She has been compliant with taking her losartan medication. Blood pressure today is stable at 110/60. Last labs were noted to be in October 2018 through  [facility]    Past medical history, past surgical history, family history reviewed-no changes    Social history reviewed-no changes    Allergies reviewed-no changes    Medications reviewed-no changes     ROS:  As per the HPI as shown above, the rest are negative.       Objective:     /60 (BP Location: Left arm, Patient Position: Sitting, BP Cuff Size: Adult)   Pulse 92   Temp 36.2 °C (97.1 °F) (Temporal)   Ht 1.575 m (5' 2\")   Wt 66.9 kg (147 lb 7.8 oz)   SpO2 94%   BMI 26.98 kg/m²     Physical Exam  Examined alert, awake, oriented, not in distress    Neck-supple, no lymphadenopathy, no thyromegaly  Lungs-clear to auscultation, no rales, no wheezes  Heart-regular rate and rhythm, no murmur  Extremities-no edema, clubbing, cyanosis           Assessment/Plan:     1. Situational mixed anxiety and depressive disorder  - Patient states Lexapro continues to help. She was recently seen by Behavior Health for counseling for counseling and was told she no longer needs to follow up with them if she has improved significantly and is currently coping very well. She will continue taking Lexapro.  Her work stress is improved but desiccation with regards to work discrimination is still ongoing so I told the patient I do not think it would be a good idea to take her off Lexapro at this time.  We will plan on take her off the medication completely once the investigation has been completed and final decision has been arrived at.  Patient understands.  - CBC WITH DIFFERENTIAL; Future  - Comp Metabolic Panel; Future  - Lipid Profile; Future    2. Mild intermittent asthma without complication  - Under good control. Continue same regimen.   - Ordering updated labs  - CBC WITH DIFFERENTIAL; Future  - Comp Metabolic Panel; Future  - Lipid Profile; Future    3. Essential hypertension  - Under good control. Continue same regimen.   - Ordering updated labs  - CBC WITH DIFFERENTIAL; Future  - Comp Metabolic Panel; Future  - " Lipid Profile; Future     Patient will follow in 6 months.    Devon DEL ANGEL (Scribe), am scribing for, and in the presence of, Noni Jansen MD     Electronically signed by: Devon Lucas (Scribe), 3/14/2019    Noni DEL ANGEL MD personally performed the services described in this documentation, as scribed by Devon Lucas in my presence, and it is both accurate and complete.

## 2019-03-20 ENCOUNTER — HOSPITAL ENCOUNTER (OUTPATIENT)
Dept: RADIOLOGY | Facility: MEDICAL CENTER | Age: 59
End: 2019-03-20
Attending: NURSE PRACTITIONER
Payer: COMMERCIAL

## 2019-03-20 ENCOUNTER — OFFICE VISIT (OUTPATIENT)
Dept: URGENT CARE | Facility: CLINIC | Age: 59
End: 2019-03-20
Payer: COMMERCIAL

## 2019-03-20 VITALS
DIASTOLIC BLOOD PRESSURE: 74 MMHG | TEMPERATURE: 97.3 F | HEIGHT: 62 IN | SYSTOLIC BLOOD PRESSURE: 120 MMHG | HEART RATE: 75 BPM | RESPIRATION RATE: 16 BRPM | OXYGEN SATURATION: 100 % | WEIGHT: 147 LBS | BODY MASS INDEX: 27.05 KG/M2

## 2019-03-20 DIAGNOSIS — R59.0 ANTERIOR CERVICAL ADENOPATHY: ICD-10-CM

## 2019-03-20 DIAGNOSIS — M54.42 ACUTE LEFT-SIDED LOW BACK PAIN WITH LEFT-SIDED SCIATICA: ICD-10-CM

## 2019-03-20 LAB
APPEARANCE UR: CLEAR
BILIRUB UR STRIP-MCNC: NEGATIVE MG/DL
COLOR UR AUTO: YELLOW
GLUCOSE UR STRIP.AUTO-MCNC: NEGATIVE MG/DL
KETONES UR STRIP.AUTO-MCNC: NEGATIVE MG/DL
LEUKOCYTE ESTERASE UR QL STRIP.AUTO: NEGATIVE
NITRITE UR QL STRIP.AUTO: NEGATIVE
PH UR STRIP.AUTO: 6.5 [PH] (ref 5–8)
PROT UR QL STRIP: NEGATIVE MG/DL
RBC UR QL AUTO: NEGATIVE
SP GR UR STRIP.AUTO: 1.01
UROBILINOGEN UR STRIP-MCNC: 0.2 MG/DL

## 2019-03-20 PROCEDURE — 99213 OFFICE O/P EST LOW 20 MIN: CPT | Mod: 25 | Performed by: NURSE PRACTITIONER

## 2019-03-20 PROCEDURE — 76536 US EXAM OF HEAD AND NECK: CPT

## 2019-03-20 PROCEDURE — 81002 URINALYSIS NONAUTO W/O SCOPE: CPT | Performed by: NURSE PRACTITIONER

## 2019-03-20 NOTE — PROGRESS NOTES
Chief Complaint   Patient presents with   • Neck Pain     left side of neck,x2 days,patient says it is a sharp pain    • Back Pain     patients lower back hurts and has tingling all the way down to left foot,x7 days       HISTORY OF PRESENT ILLNESS: Patient is a 59 y.o. female who presents to urgent care today with complaints of left neck pain and swelling for the past 3 days.  The pain is exacerbated with touch.  She denies any pain with movement.  The pain is nonradiating.  Denies any injury or trauma.  Denies any fever, chills, sore throat, ear pain, dizziness, headache, numbness, tingling, weakness, or unilateral weakness.  She has tried nothing for symptom relief.  In addition, she admits to complaints of low back pain for the past week.  Her pain is left-sided with associated tingling sensation to her left foot.  She admits to a history of the same over the past 2 years, denies any significant injury or trauma.  The pain is worse with movement.  She has seen a physician in the past for this but cannot recall any provided diagnosis.  She denies fever, urinary changes, saddle anesthesia, unilateral weakness, loss of bowel or bladder.  Denies any recent injury or trauma.  She has taken Tylenol for symptom relief.  She has never seen a spine specialist for her recurrent pain.          Patient Active Problem List    Diagnosis Date Noted   • Situational mixed anxiety and depressive disorder 03/14/2019   • Depression with anxiety 10/25/2018   • Abnormal thyroid stimulating hormone (TSH) level 01/29/2016   • Abnormal CT scan of lung 01/29/2016   • Family history of diabetes mellitus (DM) 01/12/2016   • Abnormal CXR 01/12/2016   • Shoulder pain, left 11/13/2015   • Right foot pain 11/13/2015   • Environmental allergies 07/03/2013   • Hypertension 07/11/2011   • Hepatitis B carrier (HCC) 07/11/2011       Allergies:Seasonal and Clavulanic acid    Current Outpatient Prescriptions Ordered in [facility]   Medication Sig Dispense  Refill   • losartan (COZAAR) 50 MG Tab TAKE 1/2 TABLET BY MOUTH IN THE MORNING THEN TAKE 1 TABLET IN THE EVENING 135 Tab 1   • albuterol 108 (90 Base) MCG/ACT Aero Soln inhalation aerosol Inhale 2 Puffs by mouth every 6 hours as needed for Shortness of Breath. 1 Inhaler 2   • escitalopram (LEXAPRO) 5 MG tablet Take 1 Tab by mouth every day. 90 Tab 1   • Omega-3 Fatty Acids (FISH OIL) 1000 MG Cap capsule Take 1,000 mg by mouth every day.     • Cholecalciferol (VITAMIN D) 2000 units Cap Take 2,000 Units by mouth every day.     • acetaminophen (TYLENOL) 500 MG Tab Take 1,000 mg by mouth every 8 hours as needed.     • budesonide-formoterol (SYMBICORT) 160-4.5 MCG/ACT Aerosol Inhale 2 Puffs by mouth 2 Times a Day. Rinse mouth after each use 1 Inhaler 5   • fluticasone (FLONASE) 50 MCG/ACT nasal spray Spray 1 Spray in nose 2 times a day. 1 Bottle 3   • Multiple Vitamin (MULTIVITAMIN) capsule Take 1 Cap by mouth every day. 100 Cap 0   • aspirin (ASA) 81 MG CHEW chewable tablet Take 1 Tab by mouth every day. 100 Tab 11   • loratadine (CLARITIN) 10 MG TABS Take 10 mg by mouth every day.       No current [facility]-ordered facility-administered medications on file.        Past Medical History:   Diagnosis Date   • Bronchiectasis (HCC)    • Bronchitis    • Environmental allergies 7/3/2013   • Hepatitis B carrier (HCC)    • History of pneumothorax    • Hypertension    • Pneumonia    • Positive TB test     treated    • Tuberculosis    • Tuberculosis exposure        Social History   Substance Use Topics   • Smoking status: Never Smoker   • Smokeless tobacco: Never Used   • Alcohol use 0.0 oz/week      Comment: once a month       Family Status   Relation Status   • Mo    • Fa    • Sis Alive   • Bro Alive     Family History   Problem Relation Age of Onset   • Heart Disease Mother    • Diabetes Mother    • Heart Disease Father    • Hypertension Sister    • Other Sister         1 sister - kidney transplant   •  "Diabetes Sister    • Diabetes Brother         1 brother       ROS:  Review of Systems   Constitutional: Negative for fever, chills, weight loss, malaise, and fatigue.   HENT: Positive for left anterior neck pain and swelling.  Negative for ear pain, nosebleeds, congestion, sore throat and neck pain.    Eyes: Negative for vision changes.   Neuro: Positive for tingling sensation in the left foot.  Negative for headache, dizziness, weakness, seizure, LOC.   Cardiovascular: Negative for chest pain, palpitations, orthopnea and leg swelling.   Respiratory: Negative for cough, sputum production, shortness of breath and wheezing.   Gastrointestinal: Negative for abdominal pain, nausea, vomiting or diarrhea.   Genitourinary: Negative for dysuria, urgency and frequency.  Musculoskeletal: Positive for low back pain.  Negative for falls, joint pain, myalgias.   Skin: Negative for rash, diaphoresis.     Exam:  Blood pressure 120/74, pulse 75, temperature 36.3 °C (97.3 °F), temperature source Temporal, resp. rate 16, height 1.575 m (5' 2\"), weight 66.7 kg (147 lb), SpO2 100 %, not currently breastfeeding.  General: well-nourished, well-developed female in NAD  Head: normocephalic, atraumatic  Eyes: PERRLA, no conjunctival injection, acuity grossly intact, lids normal.  Ears: normal shape and symmetry, no tenderness, no discharge. External canals are without any significant edema or erythema. Tympanic membranes are without any inflammation, no effusion. Gross auditory acuity is intact.  Nose: symmetrical without tenderness, no discharge.  Mouth/Throat: reasonable hygiene, no erythema, exudates or tonsillar enlargement.  Neck: no masses, range of motion within normal limits, no tracheal deviation. No obvious thyroid enlargement.   Lymph: Bilateral cervical adenopathy, mild, tender. No supraclavicular adenopathy.   Neuro: alert and oriented. Cranial nerves 1-12 grossly intact. No sensory deficit.   Cardiovascular: regular rate and " "rhythm. No edema.  Pulmonary: no distress. Chest is symmetrical with respiration, no wheezes, crackles, or rhonchi.   Abdomen: soft, non-tender, no guarding, no hepatosplenomegaly.  No CVA tenderness.  Musculoskeletal: no clubbing, appropriate muscle tone, gait is stable.  There is no midline spinal tenderness.  There is tenderness to left para lumbar spinal region, positive left straight leg raise, bilateral patellar DTRs +2, bilateral strength 5/5.  Gait is steady.  Skin: warm, dry, intact, no clubbing, no cyanosis, no rashes.   Psych: appropriate mood, affect, judgement.         Assessment/Plan:  1. Acute left-sided low back pain with left-sided sciatica  POCT Urinalysis    REFERRAL TO SPINE SURGERY   2. Anterior cervical adenopathy  US-SOFT TISSUES OF HEAD - NECK           US reading \"Normal left cervical soft tissue ultrasound. As with any palpable circumstances, follow-up on clinical grounds is recommended\"          The patient is a pleasant 59-year-old female who is concerned about left anterior neck pain and swelling as well as low back pain.  The area she is of concern is her cervical lymph node region, bilateral aspects are mildly tender and swollen, suspect inflammatory, US negative.  She has been instructed to monitor, return for any worsening.  Regarding her low back, I have given a referral for recurrent low back pain.  Rest, ice, OTC NSAIDs for both complaints.  Her urine today is negative for any signs of infectious process or kidney stone.  Supportive care, differential diagnoses, and indications for immediate follow-up discussed with patient.   Pathogenesis of diagnosis discussed including typical length and natural progression.   Instructed to return to clinic or nearest emergency department for any change in condition, further concerns, or worsening of symptoms.  Patient states understanding of the plan of care and discharge instructions.  Instructed to make an appointment, for follow up, with " her primary care provider.        Please note that this dictation was created using voice recognition software. I have made every reasonable attempt to correct obvious errors, but I expect that there are errors of grammar and possibly content that I did not discover before finalizing the note.      DANUTA Cevallos.

## 2019-04-15 ENCOUNTER — PHYSICAL THERAPY (OUTPATIENT)
Dept: PHYSICAL THERAPY | Facility: REHABILITATION | Age: 59
End: 2019-04-15
Attending: PHYSICIAN ASSISTANT
Payer: COMMERCIAL

## 2019-04-15 DIAGNOSIS — M54.5 LOW BACK PAIN, UNSPECIFIED BACK PAIN LATERALITY, UNSPECIFIED CHRONICITY, WITH SCIATICA PRESENCE UNSPECIFIED: ICD-10-CM

## 2019-04-15 PROCEDURE — 97110 THERAPEUTIC EXERCISES: CPT

## 2019-04-15 PROCEDURE — 97012 MECHANICAL TRACTION THERAPY: CPT

## 2019-04-15 PROCEDURE — 97162 PT EVAL MOD COMPLEX 30 MIN: CPT

## 2019-04-15 ASSESSMENT — ENCOUNTER SYMPTOMS
QUALITY: ACHING
QUALITY: TINGLING
PAIN SCALE AT HIGHEST: 7
PAIN TIMING: WHEN ACTIVE
PAIN SCALE: 7
PAIN SCALE AT LOWEST: 3
QUALITY: SHOOTING

## 2019-04-15 NOTE — OP THERAPY EVALUATION
Outpatient Physical Therapy  INITIAL EVALUATION    [facility]  [address], [address]  [place] [address]  Phone:  [phone]    Date of Evaluation: 04/15/2019    Patient: Emily Sanchez  YOB: 1960  MRN: [id number]     Referring Provider: Jeimy Gilliam P.A.-C.  [address]  [place] [address]   Referring Diagnosis Low back pain [M54.5]     Time Calculation  Start time: 1400  Stop time: 1515 Time Calculation (min): 75 minutes     Physical Therapy Occurrence Codes    Date of onset of impairment:  4/15/14   Date physical therapy care plan established or reviewed:  4/15/19   Date physical therapy treatment started:  4/15/19          Chief Complaint: Back Pain and Leg Pain    Visit Diagnoses     ICD-10-CM   1. Low back pain, unspecified back pain laterality, unspecified chronicity, with sciatica presence unspecified M54.5         Subjective   History of Present Illness:     History of chief complaint:  Chronic c/o LBP and radiating sxs down L LE into ankle. Sxs relieved briefly after epidural but overall minimal improvement in the last 4 years.    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      Treatments:     Treatments to date:  Previously used chiropractor (x20yrs) w/good relief. Mostly recently went in 2 mos ago w/o relief.        Past Medical History:   Diagnosis Date   • Bronchiectasis (HCC)    • Bronchitis    • Environmental  allergies 7/3/2013   • Hepatitis B carrier (HCC)    • History of pneumothorax    • Hypertension    • Pneumonia    • Positive TB test     treated 1990's   • Tuberculosis    • Tuberculosis exposure      Past Surgical History:   Procedure Laterality Date   • CATARACT EXTRACTION WITH IOL     • LIVER BIOPSY     • TUBAL LIGATION         Precautions:       Objective   Observation and functional movement:  Flattened spinal curvatures throughout    Range of motion and strength:    AROM L/S:  Flex = FT to ankles (poor reversal of lordosis and decreased SI mobility noted)  Ext = 75%  SB = 50 %    LE strength grossly 5/5 bilaterally    Sensation and reflexes:     Sensation is intact.    Palpation and joint mobility:     Hypomobile and TTP w/reproduction of LE sxs L3-5 centrally and L unilaterally  TTP along L sacral border    Special tests:      SLR:   R = 70 deg  L = 50 deg w/pull at L/S worsening w/DF            Therapeutic Exercises (CPT 60148):     1. HEP, Hooklying neural glides, 3D pelvic tilts, HARLEY w/focus on lower L/S and no radicular sxs    Therapeutic Treatments and Modalities:     1. Mechanical Traction (CPT 03491), L/S w/MHP, 60/40#, 45/15s, x15 min    Time-based treatments/modalities:  Therapeutic exercise minutes (CPT 84714): 15 minutes       Assessment, Response and Plan:   Assessment details:  59 yr old female w/chronic LBP and L LE radicular sxs consistent w/disc derangement. Skilled PT is indicated to address the following goals.    Goals:   Short Term Goals:   Able to push lunch cart w/25% decreased c/o difficulty  Pt to report 50% reduction in occurrence of LE sxs  Pt able to sleep through the night w/o waking w/LBP >80% of the time  Short term goal time span:  2-4 weeks      Long Term Goals:    Able to push lunch cart w/80% decreased c/o difficulty  Pt to report 80% reduction in occurrence of LE sxs  Pt able to sleep through the night w/o waking w/LBP   Long term goal time span:  6-8 weeks    Plan:    Therapy options:  Physical therapy treatment to continue  Planned therapy interventions:  E Stim Unattended (CPT 31671), Manual Therapy (CPT 76246), Mechanical Traction (CPT 70381), Neuromuscular Re-education (CPT 46116) and Therapeutic Exercise (CPT 33546)  Frequency:  2x week  Duration in weeks:  8  Plan details:  Cont PT to address proper L/S segmental mobility/control.       Functional Limitations and Severity Modifiers  Bobby Beltran Low Back Pain and Disability Score: 8.33     Referring provider co-signature:  I have reviewed this plan of care and my co-signature certifies the need for services.  Certification Dates:   From 4/15/19     To 6/7/19    Physician Signature: ________________________________ Date: ______________

## 2019-04-17 ENCOUNTER — PHYSICAL THERAPY (OUTPATIENT)
Dept: PHYSICAL THERAPY | Facility: REHABILITATION | Age: 59
End: 2019-04-17
Attending: PHYSICIAN ASSISTANT
Payer: COMMERCIAL

## 2019-04-17 DIAGNOSIS — M54.5 LOW BACK PAIN, UNSPECIFIED BACK PAIN LATERALITY, UNSPECIFIED CHRONICITY, WITH SCIATICA PRESENCE UNSPECIFIED: ICD-10-CM

## 2019-04-17 PROCEDURE — 97110 THERAPEUTIC EXERCISES: CPT

## 2019-04-17 PROCEDURE — 97140 MANUAL THERAPY 1/> REGIONS: CPT

## 2019-04-17 NOTE — OP THERAPY DAILY TREATMENT
Outpatient Physical Therapy  DAILY TREATMENT     [facility] Outpatient Physical Therapy [place] [id number] [facility], [address]  [place] [address]  Phone:  [phone]    Date: 04/17/2019    Patient: Emily Sanchez  YOB: 1960  MRN: [id number]     Time Calculation  Start time: 1400  Stop time: 1430 Time Calculation (min): 30 minutes     Chief Complaint: No chief complaint on file.    Visit #: 2    SUBJECTIVE:  Felt better after last session; doing exercises at home and feels they are helping.  Has X-ray on 5/2/19.    OBJECTIVE:  Current objective measures:  Supine 90/90 Hip PROM ER/IR:  *L GAMALIEL: 70*, +ROS L buttock.    L anterior hip/thigh + tiss restrictions (assessed via S/Ling hip ext).    L  70 / 40  +ROS c IR.  R 80 / 20    HS 90/90:  R: -10  L: -10            Therapeutic Exercises (CPT 47470):     1. Prone on elbows, HEP    2. Prone on elbows + LS flx/Ext, HEP    3. Hk lying tibial nerve glides, 1x10, focus on L, +ankle AROM c knee flx, HEP    4. Review of current HEP    5. Hk lying knee drops, 1x5 ea, HEP  // Cues to reduce amplitude.    Therapeutic Treatments and Modalities:     1. Manual Therapy (CPT 38380), 1. STM L anterior hip, adductors., // Slight decr in back tightness., 2. S/Ling STM L LS parasp and lateral hip., // ++tiss tension. Decr symptoms after.    Time-based treatments/modalities:  Manual therapy minutes (CPT 52749): 15 minutes  Therapeutic exercise minutes (CPT 69150): 15 minutes       Pain rating before treatment: 5  Pain rating after treatment: 2    ASSESSMENT:   Response to treatment: Responded well to manual therapy with decreased back and leg pain. Required practice to recall all HEP exercises but demonstrated good technique after practice.    PLAN/RECOMMENDATIONS:   Plan for treatment: therapy treatment to continue next visit.  Planned interventions for next visit: continue with current treatment.

## 2019-04-22 ENCOUNTER — PHYSICAL THERAPY (OUTPATIENT)
Dept: PHYSICAL THERAPY | Facility: REHABILITATION | Age: 59
End: 2019-04-22
Attending: PHYSICIAN ASSISTANT
Payer: COMMERCIAL

## 2019-04-22 DIAGNOSIS — M54.5 LOW BACK PAIN, UNSPECIFIED BACK PAIN LATERALITY, UNSPECIFIED CHRONICITY, WITH SCIATICA PRESENCE UNSPECIFIED: ICD-10-CM

## 2019-04-22 PROCEDURE — 97140 MANUAL THERAPY 1/> REGIONS: CPT

## 2019-04-22 PROCEDURE — 97110 THERAPEUTIC EXERCISES: CPT

## 2019-04-22 NOTE — OP THERAPY DAILY TREATMENT
Outpatient Physical Therapy  DAILY TREATMENT     [facility] Physical Therapy [place] [id number] [facility], [address]  [place] [address]  Phone:  [phone]    Date: 04/22/2019    Patient: Emily Sanchez  YOB: 1960  MRN: [id number]     Time Calculation  Start time: 1400  Stop time: 1440 Time Calculation (min): 40 minutes     Chief Complaint: Back Problem    Visit #: 3    SUBJECTIVE:  Felt good after last session; keeping up with exercises.    OBJECTIVE:  Current objective measures:  Supine 90/90 Hip PROM ER/IR:  *L GAMALIEL: 70*, +ROS L buttock.    L anterior hip/thigh + tiss restrictions (assessed via S/Ling hip ext).    L  70 / 40  +ROS c IR.  R 80 / 20    HS 90/90:  R: -10  L: -10            Therapeutic Exercises (CPT 55954):     2. Prone on elbows + LS flx/Ext, 1x20, HEP    3. Hk lying tibial nerve glides, 1x10, focus on L, +ankle AROM c knee flx, HEP    4. Neutral LS position    5. Hk lying abdominal contraction, Green, 1x10, HEP    6. BP cuff, abd activtion, heel slides, // Significant difficulty using abdominals for posterior pelvic tilt and reduction of LS extn.    Therapeutic Treatments and Modalities:     1. Manual Therapy (CPT 51889), 1. STM L anterior hip, adductors., // Slight decr in back tightness., 2. S/Ling STM L LS parasp and lateral hip., // ++tiss tension. Decr symptoms after.    Time-based treatments/modalities:  Manual therapy minutes (CPT 89995): 10 minutes  Therapeutic exercise minutes (CPT 25278): 30 minutes       Pain rating before treatment: 3  Pain rating after treatment: 2    ASSESSMENT:   Response to treatment: Responded well to manual therapy with decreased LS paraspinal muscle guarding. Significant difficulty engaging abdominals to facilitate posterior pelvic tilt and reduced LS extn in hk lying. Will benefit from further practice; few reps of posterior pelvic tilt helped reduce LS pain.    PLAN/RECOMMENDATIONS:   Plan for treatment: therapy treatment to continue  next visit.  Planned interventions for next visit: continue with current treatment.

## 2019-04-24 ENCOUNTER — PHYSICAL THERAPY (OUTPATIENT)
Dept: PHYSICAL THERAPY | Facility: REHABILITATION | Age: 59
End: 2019-04-24
Attending: PHYSICIAN ASSISTANT
Payer: COMMERCIAL

## 2019-04-24 DIAGNOSIS — M54.5 LOW BACK PAIN, UNSPECIFIED BACK PAIN LATERALITY, UNSPECIFIED CHRONICITY, WITH SCIATICA PRESENCE UNSPECIFIED: ICD-10-CM

## 2019-04-24 PROCEDURE — 97110 THERAPEUTIC EXERCISES: CPT

## 2019-04-24 PROCEDURE — 97140 MANUAL THERAPY 1/> REGIONS: CPT

## 2019-04-24 NOTE — OP THERAPY DAILY TREATMENT
Outpatient Physical Therapy  DAILY TREATMENT     [facility] Physical Therapy [place] [address], [address]  [place] [address]  Phone:  [phone]    Date: 04/24/2019    Patient: Emily Sanchez  YOB: 1960  MRN: [id number]     Time Calculation  Start time: 1400  Stop time: 1440 Time Calculation (min): 40 minutes     Chief Complaint: Back Problem    Visit #: 4    SUBJECTIVE:  Felt good after last session; exercises were helpful. Did some yard work yesterday and was sore this morning, but better now.    OBJECTIVE:  Current objective measures:  Supine 90/90 L Hip PROM ER/IR:  70 / 30 -ROS.  *L GAMALIEL: 70*, +ROS L buttock.    L anterior hip/thigh + tiss restrictions (assessed via S/Ling hip ext).    L  70 / 40  +ROS c IR.  R 80 / 20    HS 90/90:  R: -10  L: -10            Therapeutic Exercises (CPT 96458):     2. Prone on elbows + LS flx/Ext, 1x20, Not done 4/24/19    3. Hk lying tibial nerve glides, 1x10, focus on L, +ankle AROM c knee flx, HEP    4. Neutral LS position, Standing, pushing, pulling, work simulation pushing cart.    5. Hk lying abdominal contraction, Green, 1x10, HEP    6. BP cuff, abd activtion, heel slides, // Significant difficulty using abdominals for posterior pelvic tilt and reduction of LS extn.    Therapeutic Treatments and Modalities:     1. Manual Therapy (CPT 44196), 1. STM L adductors., // Slight decr in back tightness. Abolition of LS symptoms with PROM hip abduction., 2. S/Ling STM L LS parasp and lateral hip., // ++tiss tension. Decr symptoms after.    Time-based treatments/modalities:  Manual therapy minutes (CPT 04806): 15 minutes  Therapeutic exercise minutes (CPT 92588): 25 minutes       Pain rating before treatment: 3  Pain rating after treatment: 2    ASSESSMENT:   Response to treatment: Responded well to manual therapy with decreased LS paraspinal muscle guarding. Improved ability engaging abdominals to facilitate posterior pelvic tilt and reduced LS  extn in standing and when pushing.    Will benefit from further practice; few reps of posterior pelvic tilt helped reduce LS pain.    PLAN/RECOMMENDATIONS:   Plan for treatment: therapy treatment to continue next visit.  Planned interventions for next visit: continue with current treatment.

## 2019-04-29 ENCOUNTER — PHYSICAL THERAPY (OUTPATIENT)
Dept: PHYSICAL THERAPY | Facility: REHABILITATION | Age: 59
End: 2019-04-29
Attending: PHYSICIAN ASSISTANT
Payer: COMMERCIAL

## 2019-04-29 DIAGNOSIS — M54.5 LOW BACK PAIN, UNSPECIFIED BACK PAIN LATERALITY, UNSPECIFIED CHRONICITY, WITH SCIATICA PRESENCE UNSPECIFIED: ICD-10-CM

## 2019-04-29 PROCEDURE — 97110 THERAPEUTIC EXERCISES: CPT

## 2019-04-29 NOTE — OP THERAPY DAILY TREATMENT
Outpatient Physical Therapy  DAILY TREATMENT     [facility] Physical Therapy [place] [address] [facility], [address]  [place] [address]  Phone:  [phone]    Date: 04/29/2019    Patient: Emily Sanchez  YOB: 1960  MRN: [id number]     Time Calculation  Start time: 1430  Stop time: 1500 Time Calculation (min): 30 minutes     Chief Complaint: Back Problem    Visit #: 5    SUBJECTIVE:  Felt good after last session; exercises were helpful. Did some yard work yesterday and was sore this morning, but better now.    OBJECTIVE:  Current objective measures:  Supine 90/90 L Hip PROM ER/IR:  70 / 30 -ROS.  *L GAMALIEL: 70*, +ROS L buttock.    L  70 / 40  +ROS c IR.  R 80 / 20    HS 90/90:  R: -10  L: -10            Therapeutic Exercises (CPT 26570):     1. Supine 90/90, 1x8, 10 sec hold, HEP    2. Hk lying tibial nerve glides, 1x10, focus on L, +ankle AROM c knee flx, HEP    3. Standing vinita abd band pull, Green, 2x10, HEP    4. Pallof press, Green 1x10 ea, HEP    5. Neutral LS position, Standing, pushing, pulling, work simulation pushing cart.    6. BP cuff, abd activtion, heel slides      Therapeutic Exercise Summary: Previous:  Prone on elbows + LS flx/Ext      Time-based treatments/modalities:  Therapeutic exercise minutes (CPT 91010): 30 minutes       Pain rating before treatment: 0  Pain rating after treatment: 0    ASSESSMENT:   Response to treatment: Pt continues progressing very well. No longer having significant back pain and now demonstrates ability to engage abdominals to reduce excessive anterior pelvic tilt and LS lordosis.     Will benefit from further practice; few reps of posterior pelvic tilt helped reduce LS pain.    PLAN/RECOMMENDATIONS:   Plan for treatment: therapy treatment to continue next visit.  Planned interventions for next visit: continue with current treatment.

## 2019-05-01 ENCOUNTER — PHYSICAL THERAPY (OUTPATIENT)
Dept: PHYSICAL THERAPY | Facility: REHABILITATION | Age: 59
End: 2019-05-01
Attending: PHYSICIAN ASSISTANT
Payer: COMMERCIAL

## 2019-05-01 DIAGNOSIS — M54.5 LOW BACK PAIN, UNSPECIFIED BACK PAIN LATERALITY, UNSPECIFIED CHRONICITY, WITH SCIATICA PRESENCE UNSPECIFIED: ICD-10-CM

## 2019-05-01 PROCEDURE — 97110 THERAPEUTIC EXERCISES: CPT

## 2019-05-01 NOTE — OP THERAPY DAILY TREATMENT
Outpatient Physical Therapy  DAILY TREATMENT     [facility] Physical Therapy [place] [id number] [facility], [address]  [place] [address]  Phone:  [phone]    Date: 05/01/2019    Patient: Emily Sanchez  YOB: 1960  MRN: [id number]     Time Calculation  Start time: 1430  Stop time: 1500 Time Calculation (min): 30 minutes     Chief Complaint: Back Problem    Visit #: 6    SUBJECTIVE:  Felt good after last session; exercises were helpful. Did some yard work yesterday and was sore this morning, but better now.    OBJECTIVE:  Current objective measures:  Supine 90/90 L Hip PROM ER/IR:  70 / 30 -ROS.  *L GAMALIEL: 70*, +ROS L buttock.    L  70 / 40  +ROS c IR.  R 80 / 20    HS 90/90:  R: -10  L: -10            Therapeutic Exercises (CPT 38512):     1. Supine 90/90, 1x8, 10 sec hold, HEP    5. Neutral LS position, Review    6. BP cuff, abd activtion, heel slides    8. TB TS rolling on wall, HEP    9. LS parasp stretch, prone on swiss ball, 2x45 sec, HEP    10. * Standing R glute set, SL Stance + L hip flx, Focus on level pelvis, 5 rounds., HEP      Therapeutic Exercise Summary: Previous:  Prone on elbows + LS flx/Ext    Therapeutic Treatments and Modalities:     1. Manual Therapy (CPT 48255), 1. STM L LS., // ++muscle guarding / excessive tension.    Time-based treatments/modalities:  Manual therapy minutes (CPT 15723): 5 minutes  Therapeutic exercise minutes (CPT 31430): 25 minutes       Pain rating before treatment: 0  Pain rating after treatment: 0    ASSESSMENT:   Response to treatment: Pt continues progressing very well. Mild L pelvic drop in R stance phase likely contributing to excessive tightness/spasm along L paraspinal muscles. R SL balance -> L trunk rotn and R hip adduction noted; no compensations during L SL stance. Tolerated R SL stance training well with UE support and without back pain.     Will benefit from further practice; few reps of posterior pelvic tilt helped reduce LS  pain.    PLAN/RECOMMENDATIONS:   Plan for treatment: therapy treatment to continue next visit.  Planned interventions for next visit: continue with current treatment.

## 2019-05-06 ENCOUNTER — PHYSICAL THERAPY (OUTPATIENT)
Dept: PHYSICAL THERAPY | Facility: REHABILITATION | Age: 59
End: 2019-05-06
Attending: PHYSICIAN ASSISTANT
Payer: COMMERCIAL

## 2019-05-06 DIAGNOSIS — M54.5 LOW BACK PAIN, UNSPECIFIED BACK PAIN LATERALITY, UNSPECIFIED CHRONICITY, WITH SCIATICA PRESENCE UNSPECIFIED: ICD-10-CM

## 2019-05-06 PROCEDURE — 97110 THERAPEUTIC EXERCISES: CPT

## 2019-05-06 NOTE — OP THERAPY DISCHARGE SUMMARY
Outpatient Physical Therapy  DISCHARGE SUMMARY NOTE      [facility] Physical Therapy [place] [address] [facility], [address]  [place] [address]  Phone:  [phone]    Date of Visit: 05/06/2019    Patient: Emily Sanchez  YOB: 1960  MRN: [id number]     Referring Provider: Jeimy Gilliam P.A.-C.  [address]  [place] [address]   Referring Diagnosis Low back pain [M54.5]     Physical Therapy Occurrence Codes    Date of onset of impairment:  4/15/14   Date physical therapy care plan established or reviewed:  4/15/19   Date physical therapy treatment started:  4/15/19          Functional Limitations and Severity Modifiers  Bobby Beltran Low Back Pain and Disability Score: 0       Your patient is being discharged from Physical Therapy with the following comments:   · Goals met    Comments:  Overall, the patient has made good progress with PT. Although she still has some intermittent back pain, she reports decreases in pain intensity. She feels her trunk muscles have gotten stronger and she has improved postural awareness. She demonstrates good technique with her HEP and states she is comfortable with DC from PT today.       Recommendations:  DC from PT.    Mauro Jaramillo, PT, DPT, OCS    Date: 5/6/2019

## 2019-05-06 NOTE — OP THERAPY DAILY TREATMENT
Outpatient Physical Therapy  DAILY TREATMENT     [facility] Therapy [place] [address] [facility], [address]  [place] [address]  Phone:  [phone]    Date: 05/06/2019    Patient: Emily Sanchez  YOB: 1960  MRN: [id number]     Time Calculation  Start time: 1400  Stop time: 1440 Time Calculation (min): 40 minutes     Chief Complaint: Back Problem    Visit #: 7    SUBJECTIVE:  Back was a little sore after last session, but was fine by the next day. Overall, back is getting better gradually.    OBJECTIVE:  Current objective measures:  GAMALIEL: 70% bilaterally, -ROS.    Supine hip 90/90:  L  70 / 40  -ROS.  R 80 / 30  -ROS.    HS 90/90:  R: 0  L: -10            Therapeutic Exercises (CPT 01963):     1. HS stretch, 1x3 ea, 10 sec hold, HEP    2. Supine 90/90, 1x3, 10 sec hold, HEP    3. Standing abd band pull, Green, 1x10, HEP  // Cues to avoid LS extn.    4. Pallof press, Green, 1x10 ea    5. TB TS rolling on wall, HEP    6. LS parasp stretch, prone on swiss ball, HEP    8. BP cuff, abd activtion, heel slides, HEP    10. * Standing R glute set, SL Stance + L hip flx, Focus on level pelvis., HEP      Therapeutic Exercise Summary: Previous:  Prone on elbows + LS flx/Ext      Time-based treatments/modalities:  Therapeutic exercise minutes (CPT 46053): 40 minutes       Pain rating before treatment: 0  Pain rating after treatment: 0    ASSESSMENT:   Response to treatment: Pt continues progressing very well with less back pain and improved trunk stability and spinal alignment during resisted exercises. Demonstrated good technique with review of HEP today.    PLAN/RECOMMENDATIONS:   Plan for treatment: discharge patient due to accomplished goals.

## 2019-05-07 ENCOUNTER — HOSPITAL ENCOUNTER (OUTPATIENT)
Dept: LAB | Facility: MEDICAL CENTER | Age: 59
End: 2019-05-07
Attending: PHYSICIAN ASSISTANT
Payer: COMMERCIAL

## 2019-05-07 LAB
ALBUMIN SERPL BCP-MCNC: 4.3 G/DL (ref 3.2–4.9)
ALP SERPL-CCNC: 76 U/L (ref 30–99)
ALT SERPL-CCNC: 23 U/L (ref 2–50)
AST SERPL-CCNC: 22 U/L (ref 12–45)
BILIRUB CONJ SERPL-MCNC: <0.1 MG/DL (ref 0.1–0.5)
BILIRUB INDIRECT SERPL-MCNC: NORMAL MG/DL (ref 0–1)
BILIRUB SERPL-MCNC: 0.2 MG/DL (ref 0.1–1.5)
PROT SERPL-MCNC: 7.7 G/DL (ref 6–8.2)

## 2019-05-07 PROCEDURE — 87517 HEPATITIS B DNA QUANT: CPT

## 2019-05-07 PROCEDURE — 82107 ALPHA-FETOPROTEIN L3: CPT

## 2019-05-07 PROCEDURE — 36415 COLL VENOUS BLD VENIPUNCTURE: CPT

## 2019-05-07 PROCEDURE — 80076 HEPATIC FUNCTION PANEL: CPT

## 2019-05-10 LAB
HBV DNA SERPL NAA+PROBE-ACNC: NOT DETECTED IU/ML
HBV DNA SERPL NAA+PROBE-LOG IU: NOT DETECTED LOG IU/ML
HBV DNA SERPL QL NAA+PROBE: NOT DETECTED

## 2019-05-11 LAB
AFP L3 MFR SERPL: <0.5 % (ref 0–9.9)
AFP SERPL-MCNC: 3 NG/ML (ref 0–15)

## 2019-05-29 ENCOUNTER — OFFICE VISIT (OUTPATIENT)
Dept: URGENT CARE | Facility: CLINIC | Age: 59
End: 2019-05-29
Payer: COMMERCIAL

## 2019-05-29 ENCOUNTER — APPOINTMENT (OUTPATIENT)
Dept: RADIOLOGY | Facility: IMAGING CENTER | Age: 59
End: 2019-05-29
Attending: NURSE PRACTITIONER
Payer: COMMERCIAL

## 2019-05-29 VITALS
HEIGHT: 62 IN | DIASTOLIC BLOOD PRESSURE: 78 MMHG | OXYGEN SATURATION: 97 % | SYSTOLIC BLOOD PRESSURE: 132 MMHG | BODY MASS INDEX: 27.05 KG/M2 | RESPIRATION RATE: 12 BRPM | HEART RATE: 94 BPM | WEIGHT: 147 LBS | TEMPERATURE: 97.7 F

## 2019-05-29 DIAGNOSIS — M25.562 LEFT MEDIAL KNEE PAIN: ICD-10-CM

## 2019-05-29 PROCEDURE — 73562 X-RAY EXAM OF KNEE 3: CPT | Mod: TC,LT | Performed by: NURSE PRACTITIONER

## 2019-05-29 PROCEDURE — 99213 OFFICE O/P EST LOW 20 MIN: CPT | Performed by: NURSE PRACTITIONER

## 2019-05-29 ASSESSMENT — ENCOUNTER SYMPTOMS
WEAKNESS: 0
FOCAL WEAKNESS: 0
SENSORY CHANGE: 0
TINGLING: 0
FEVER: 0
CHILLS: 0

## 2019-05-29 NOTE — PROGRESS NOTES
Subjective:      Emily Sanchez is a 59 y.o. female who presents with Knee Pain (LT knee pain x2-3 days no injury)            HPI New. 59 year old female with atraumatic knee pain x 2-3 days. She denies any associated ankle or hip pain. This pain is localized to medial aspect without radiation. Weight bearing makes this worse; rest makes it better. Denies any swelling or bruising. She has been taking ibuprofen for this issue.   Seasonal and Clavulanic acid  Current Outpatient Prescriptions on File Prior to Visit   Medication Sig Dispense Refill   • losartan (COZAAR) 50 MG Tab TAKE 1/2 TABLET BY MOUTH IN THE MORNING THEN TAKE 1 TABLET IN THE EVENING 135 Tab 1   • escitalopram (LEXAPRO) 5 MG tablet Take 1 Tab by mouth every day. 90 Tab 1   • Omega-3 Fatty Acids (FISH OIL) 1000 MG Cap capsule Take 1,000 mg by mouth every day.     • Cholecalciferol (VITAMIN D) 2000 units Cap Take 2,000 Units by mouth every day.     • fluticasone (FLONASE) 50 MCG/ACT nasal spray Spray 1 Spray in nose 2 times a day. 1 Bottle 3   • Multiple Vitamin (MULTIVITAMIN) capsule Take 1 Cap by mouth every day. 100 Cap 0   • aspirin (ASA) 81 MG CHEW chewable tablet Take 1 Tab by mouth every day. 100 Tab 11   • loratadine (CLARITIN) 10 MG TABS Take 10 mg by mouth every day.     • albuterol 108 (90 Base) MCG/ACT Aero Soln inhalation aerosol Inhale 2 Puffs by mouth every 6 hours as needed for Shortness of Breath. 1 Inhaler 2   • acetaminophen (TYLENOL) 500 MG Tab Take 1,000 mg by mouth every 8 hours as needed.     • budesonide-formoterol (SYMBICORT) 160-4.5 MCG/ACT Aerosol Inhale 2 Puffs by mouth 2 Times a Day. Rinse mouth after each use 1 Inhaler 5     No current facility-administered medications on file prior to visit.      Social History     Social History   • Marital status:      Spouse name: N/A   • Number of children: 3   • Years of education: N/A     Occupational History   • nutrition services      Social History Main Topics   •  "Smoking status: Never Smoker   • Smokeless tobacco: Never Used   • Alcohol use 0.0 oz/week      Comment: once a month   • Drug use: No   • Sexual activity: Yes     Partners: Male     Other Topics Concern   • Not on file     Social History Narrative   • No narrative on file     family history includes Diabetes in her brother, mother, and sister; Heart Disease in her father and mother; Hypertension in her sister; Other in her sister.      Review of Systems   Constitutional: Negative for chills and fever.   Musculoskeletal: Positive for joint pain.   Neurological: Negative for tingling, sensory change, focal weakness and weakness.          Objective:     /78 (BP Location: Left arm, Patient Position: Sitting, BP Cuff Size: Adult)   Pulse 94   Temp 36.5 °C (97.7 °F)   Resp 12   Ht 1.575 m (5' 2\")   Wt 66.7 kg (147 lb)   SpO2 97%   BMI 26.89 kg/m²      Physical Exam   Constitutional: She is oriented to person, place, and time. She appears well-developed and well-nourished. No distress.   Cardiovascular: Normal rate, regular rhythm and normal heart sounds.    No murmur heard.  Pulmonary/Chest: Effort normal and breath sounds normal. No respiratory distress.   Musculoskeletal:        Left knee: She exhibits decreased range of motion. She exhibits no effusion, no ecchymosis and no deformity. Tenderness found. Medial joint line tenderness noted.   Neurological: She is alert and oriented to person, place, and time.   Skin: Skin is warm and dry.   Psychiatric: She has a normal mood and affect. Her behavior is normal. Thought content normal.   Nursing note and vitals reviewed.              Assessment/Plan:     1. Left medial knee pain  DX-KNEE 3 VIEWS LEFT     X-ray with no bony abnormality  Reviewed RICE protocol with patient as well as ibuprofen dosing.  Differential diagnosis, natural history, supportive care, and indications for immediate follow-up discussed at length.     "

## 2019-05-29 NOTE — LETTER
May 29, 2019        Emily Sanchez  [address] [facility] [address] [address]        Emily Chaparro was seen in our clinic today and she is excused from work for tomorrow and Friday. Thank you.  If you have any questions or concerns, please don't hesitate to call.        Sincerely,        PATSY Cordoba.P.ARSENIO.    Electronically Signed

## 2019-06-10 ENCOUNTER — HOSPITAL ENCOUNTER (OUTPATIENT)
Dept: RADIOLOGY | Facility: MEDICAL CENTER | Age: 59
End: 2019-06-10
Attending: PHYSICIAN ASSISTANT
Payer: COMMERCIAL

## 2019-06-10 DIAGNOSIS — B18.1 HEPATITIS B CARRIER (HCC): ICD-10-CM

## 2019-06-10 PROCEDURE — 76705 ECHO EXAM OF ABDOMEN: CPT

## 2019-07-09 ENCOUNTER — OFFICE VISIT (OUTPATIENT)
Dept: URGENT CARE | Facility: CLINIC | Age: 59
End: 2019-07-09
Payer: COMMERCIAL

## 2019-07-09 ENCOUNTER — APPOINTMENT (OUTPATIENT)
Dept: RADIOLOGY | Facility: IMAGING CENTER | Age: 59
End: 2019-07-09
Attending: NURSE PRACTITIONER
Payer: COMMERCIAL

## 2019-07-09 VITALS
OXYGEN SATURATION: 96 % | DIASTOLIC BLOOD PRESSURE: 74 MMHG | WEIGHT: 146 LBS | RESPIRATION RATE: 14 BRPM | HEART RATE: 74 BPM | SYSTOLIC BLOOD PRESSURE: 122 MMHG | HEIGHT: 62 IN | BODY MASS INDEX: 26.87 KG/M2 | TEMPERATURE: 97.1 F

## 2019-07-09 DIAGNOSIS — J22 ACUTE LOWER RESPIRATORY TRACT INFECTION: ICD-10-CM

## 2019-07-09 DIAGNOSIS — R05.9 COUGH: ICD-10-CM

## 2019-07-09 DIAGNOSIS — R05.8 PRODUCTIVE COUGH: ICD-10-CM

## 2019-07-09 PROCEDURE — 99214 OFFICE O/P EST MOD 30 MIN: CPT | Performed by: NURSE PRACTITIONER

## 2019-07-09 PROCEDURE — 71046 X-RAY EXAM CHEST 2 VIEWS: CPT | Mod: TC | Performed by: NURSE PRACTITIONER

## 2019-07-09 RX ORDER — CLINDAMYCIN HYDROCHLORIDE 300 MG/1
CAPSULE ORAL
Refills: 0 | COMMUNITY
Start: 2019-06-06 | End: 2019-07-09

## 2019-07-09 RX ORDER — MONTELUKAST SODIUM 10 MG/1
TABLET ORAL
Refills: 4 | COMMUNITY
Start: 2019-05-08 | End: 2020-08-05

## 2019-07-09 RX ORDER — BENZONATATE 100 MG/1
100 CAPSULE ORAL 3 TIMES DAILY PRN
Qty: 60 CAP | Refills: 0 | Status: SHIPPED | OUTPATIENT
Start: 2019-07-09 | End: 2020-08-05

## 2019-07-09 RX ORDER — AZITHROMYCIN 250 MG/1
TABLET, FILM COATED ORAL
Qty: 6 TAB | Refills: 0 | Status: SHIPPED | OUTPATIENT
Start: 2019-07-09 | End: 2019-07-15

## 2019-07-09 RX ORDER — CEFUROXIME AXETIL 500 MG/1
500 TABLET ORAL 2 TIMES DAILY
Qty: 14 TAB | Refills: 0 | Status: SHIPPED | OUTPATIENT
Start: 2019-07-09 | End: 2019-07-16

## 2019-07-09 RX ORDER — PREDNISONE 10 MG/1
20 TABLET ORAL 2 TIMES DAILY
Qty: 20 TAB | Refills: 0 | Status: SHIPPED | OUTPATIENT
Start: 2019-07-09 | End: 2019-07-14

## 2019-07-09 RX ORDER — DILTIAZEM HYDROCHLORIDE 60 MG/1
TABLET, FILM COATED ORAL
Refills: 4 | COMMUNITY
Start: 2019-07-05 | End: 2021-12-20 | Stop reason: SDUPTHER

## 2019-07-09 RX ORDER — METHYLPREDNISOLONE 4 MG/1
TABLET ORAL
Refills: 0 | COMMUNITY
Start: 2019-06-06 | End: 2019-07-09

## 2019-07-09 NOTE — PROGRESS NOTES
Chief Complaint   Patient presents with   • Cough     cough, chest congestion x 3 weeks       HISTORY OF PRESENT ILLNESS: Patient is a 59 y.o. female who presents today due to symptoms that started three weeks ago. Pt reports a productive (yellowish sputum) cough. Reports associated initial sore throat, fatigue, malaise, and wheezing. Denies nasal congestion, sinus pressure, sore throat, ear pressure, fever, chills. Admits to h/o asthma, takes her home meds as directed for such. Has tried OTC cold medications without significant relief of symptoms. No recent ABX use. No other aggravating or alleviating factors.     Patient Active Problem List    Diagnosis Date Noted   • Situational mixed anxiety and depressive disorder 03/14/2019   • Depression with anxiety 10/25/2018   • Abnormal thyroid stimulating hormone (TSH) level 01/29/2016   • Abnormal CT scan of lung 01/29/2016   • Family history of diabetes mellitus (DM) 01/12/2016   • Abnormal CXR 01/12/2016   • Shoulder pain, left 11/13/2015   • Right foot pain 11/13/2015   • Environmental allergies 07/03/2013   • Hypertension 07/11/2011   • Hepatitis B carrier (HCC) 07/11/2011       Allergies:Seasonal; Amoxicillin-pot clavulanate; and Clavulanic acid    Current Outpatient Prescriptions Ordered in [facility]   Medication Sig Dispense Refill   • SYMBICORT 80-4.5 MCG/ACT Aerosol INHALE 1 TO 2 PUFFS BY MOUTH 2 TIMES A DAY. RINSE MOUTH WITH WATER AFTER EACH USE  4   • montelukast (SINGULAIR) 10 MG Tab TAKE 1 TABLET BY MOUTH EVERYDAY AT BEDTIME  4   • losartan (COZAAR) 50 MG Tab TAKE 1/2 TABLET BY MOUTH IN THE MORNING THEN TAKE 1 TABLET IN THE EVENING 135 Tab 1   • escitalopram (LEXAPRO) 5 MG tablet Take 1 Tab by mouth every day. 90 Tab 1   • Omega-3 Fatty Acids (FISH OIL) 1000 MG Cap capsule Take 1,000 mg by mouth every day.     • Cholecalciferol (VITAMIN D) 2000 units Cap Take 2,000 Units by mouth every day.     • budesonide-formoterol (SYMBICORT) 160-4.5 MCG/ACT Aerosol Inhale 2  Puffs by mouth 2 Times a Day. Rinse mouth after each use 1 Inhaler 5   • Multiple Vitamin (MULTIVITAMIN) capsule Take 1 Cap by mouth every day. 100 Cap 0   • aspirin (ASA) 81 MG CHEW chewable tablet Take 1 Tab by mouth every day. 100 Tab 11   • loratadine (CLARITIN) 10 MG TABS Take 10 mg by mouth every day.     • albuterol 108 (90 Base) MCG/ACT Aero Soln inhalation aerosol Inhale 2 Puffs by mouth every 6 hours as needed for Shortness of Breath. 1 Inhaler 2   • acetaminophen (TYLENOL) 500 MG Tab Take 1,000 mg by mouth every 8 hours as needed.     • fluticasone (FLONASE) 50 MCG/ACT nasal spray Spray 1 Spray in nose 2 times a day. 1 Bottle 3     No current Epic-ordered facility-administered medications on file.        Past Medical History:   Diagnosis Date   • Bronchiectasis (HCC)    • Bronchitis    • Environmental allergies 7/3/2013   • Hepatitis B carrier (HCC)    • History of pneumothorax    • Hypertension    • Pneumonia    • Positive TB test     treated    • Tuberculosis    • Tuberculosis exposure        Social History   Substance Use Topics   • Smoking status: Never Smoker   • Smokeless tobacco: Never Used   • Alcohol use 0.0 oz/week      Comment: once a month       Family Status   Relation Status   • Mo    • Fa    • Sis Alive   • Bro Alive     Family History   Problem Relation Age of Onset   • Heart Disease Mother    • Diabetes Mother    • Heart Disease Father    • Hypertension Sister    • Other Sister         1 sister - kidney transplant   • Diabetes Sister    • Diabetes Brother         1 brother       ROS:  Review of Systems   Constitutional: Positive for fatigue, malaise. Negative for fever, chills, weight loss.  HENT: Positive for sore throat. Negative for ear pain, nosebleeds, and neck pain.    Eyes: Negative for vision changes.   Cardiovascular: Negative for chest pain, palpitations, orthopnea and leg swelling.   Respiratory: Positive for cough and sputum production, wheezing. Negative  "for shortness of breath.  Gastrointestinal: Negative for abdominal pain, nausea, vomiting or diarrhea.   Skin: Negative for rash, diaphoresis.     Exam:  /74 (BP Location: Left arm, Patient Position: Sitting, BP Cuff Size: Adult)   Pulse 74   Temp 36.2 °C (97.1 °F) (Temporal)   Resp 14   Ht 1.575 m (5' 2.01\")   Wt 66.2 kg (146 lb)   SpO2 96%   General: well-nourished, well-developed female in NAD  Head: normocephalic, atraumatic  Eyes: PERRLA, EOM within normal limits, no conjunctival injection, no scleral icterus, visual fields and acuity grossly intact.  Ears: normal shape and symmetry, no tenderness, no discharge. External canals are without any significant edema or erythema. Tympanic membranes are without any inflammation, no effusion. Gross auditory acuity is intact.  Nose: symmetrical without tenderness, no discharge, no erythema present nares.  Mouth/Throat: reasonable hygiene, no exudates or tonsillar enlargement. No erythema present.   Neck: no masses, range of motion within normal limits, no tracheal deviation.  Lymph: no cervical adenopathy. No supraclavicular adenopathy.   Neuro: alert and oriented. Cranial nerves 1-12 grossly intact.   Cardiovascular: regular rate and rhythm without murmurs, rubs, or gallops. No edema.   Pulmonary: no distress. Chest is symmetrical with respiration. No crackles or rhonchi. Diminished lung sounds RLL. Mild scattered wheezes.   Musculoskeletal: appropriate muscle tone, gait is stable.  Skin: warm, dry, intact, no clubbing, no cyanosis.   Psych: appropriate mood, affect, judgement.         Assessment/Plan:  1. Acute lower respiratory tract infection  cefUROXime (CEFTIN) 500 MG Tab    azithromycin (ZITHROMAX Z-COLIN) 250 MG Tab    predniSONE (DELTASONE) 10 MG Tab   2. Productive cough  DX-CHEST-2 VIEWS    benzonatate (TESSALON) 100 MG Cap         DX chest reviewed by myself, radiology reading \"RIGHT pleural scar with overlying atelectasis and scar, not " "demonstrably changed\"        Ceftin and azithromycin as directed. Prednisone as directed. Tessalon as needed for cough.   Rest, increase fluids, hand and respiratory hygiene. Continue home asthma meds as needed/directed.   Supportive care, differential diagnoses, and indications for immediate follow-up discussed with patient.   Pathogenesis of diagnosis discussed including typical length and natural progression.  Instructed to return to clinic or nearest emergency department for any change in condition, further concerns, or worsening of symptoms.  Patient states understanding of the plan of care and discharge instructions.  Instructed to make an appointment with her primary care provider in the next 3-5 days if not significantly improving and for further care.         Please note that this dictation was created using voice recognition software. I have made every reasonable attempt to correct obvious errors, but I expect that there are errors of grammar and possibly content that I did not discover before finalizing the note.  A mask was worn for the entire visit with the patient.     DANUTA Cevallos.             "

## 2019-08-31 ENCOUNTER — HOSPITAL ENCOUNTER (OUTPATIENT)
Dept: LAB | Facility: MEDICAL CENTER | Age: 59
End: 2019-08-31
Attending: FAMILY MEDICINE
Payer: COMMERCIAL

## 2019-08-31 DIAGNOSIS — J45.20 MILD INTERMITTENT ASTHMA WITHOUT COMPLICATION: ICD-10-CM

## 2019-08-31 DIAGNOSIS — F43.23 SITUATIONAL MIXED ANXIETY AND DEPRESSIVE DISORDER: ICD-10-CM

## 2019-08-31 DIAGNOSIS — I10 ESSENTIAL HYPERTENSION: ICD-10-CM

## 2019-08-31 LAB
ALBUMIN SERPL BCP-MCNC: 4.5 G/DL (ref 3.2–4.9)
ALBUMIN/GLOB SERPL: 1.5 G/DL
ALP SERPL-CCNC: 66 U/L (ref 30–99)
ALT SERPL-CCNC: 23 U/L (ref 2–50)
ANION GAP SERPL CALC-SCNC: 8 MMOL/L (ref 0–11.9)
AST SERPL-CCNC: 21 U/L (ref 12–45)
BASOPHILS # BLD AUTO: 1.1 % (ref 0–1.8)
BASOPHILS # BLD: 0.07 K/UL (ref 0–0.12)
BILIRUB SERPL-MCNC: 0.6 MG/DL (ref 0.1–1.5)
BUN SERPL-MCNC: 14 MG/DL (ref 8–22)
CALCIUM SERPL-MCNC: 9.5 MG/DL (ref 8.5–10.5)
CHLORIDE SERPL-SCNC: 104 MMOL/L (ref 96–112)
CHOLEST SERPL-MCNC: 193 MG/DL (ref 100–199)
CO2 SERPL-SCNC: 27 MMOL/L (ref 20–33)
CREAT SERPL-MCNC: 0.71 MG/DL (ref 0.5–1.4)
EOSINOPHIL # BLD AUTO: 0.42 K/UL (ref 0–0.51)
EOSINOPHIL NFR BLD: 6.5 % (ref 0–6.9)
ERYTHROCYTE [DISTWIDTH] IN BLOOD BY AUTOMATED COUNT: 41.4 FL (ref 35.9–50)
FASTING STATUS PATIENT QL REPORTED: NORMAL
GLOBULIN SER CALC-MCNC: 3.1 G/DL (ref 1.9–3.5)
GLUCOSE SERPL-MCNC: 95 MG/DL (ref 65–99)
HCT VFR BLD AUTO: 45.7 % (ref 37–47)
HDLC SERPL-MCNC: 69 MG/DL
HGB BLD-MCNC: 14.3 G/DL (ref 12–16)
IMM GRANULOCYTES # BLD AUTO: 0.01 K/UL (ref 0–0.11)
IMM GRANULOCYTES NFR BLD AUTO: 0.2 % (ref 0–0.9)
LDLC SERPL CALC-MCNC: 102 MG/DL
LYMPHOCYTES # BLD AUTO: 2.32 K/UL (ref 1–4.8)
LYMPHOCYTES NFR BLD: 35.9 % (ref 22–41)
MCH RBC QN AUTO: 29.4 PG (ref 27–33)
MCHC RBC AUTO-ENTMCNC: 31.3 G/DL (ref 33.6–35)
MCV RBC AUTO: 94 FL (ref 81.4–97.8)
MONOCYTES # BLD AUTO: 0.39 K/UL (ref 0–0.85)
MONOCYTES NFR BLD AUTO: 6 % (ref 0–13.4)
NEUTROPHILS # BLD AUTO: 3.25 K/UL (ref 2–7.15)
NEUTROPHILS NFR BLD: 50.3 % (ref 44–72)
NRBC # BLD AUTO: 0 K/UL
NRBC BLD-RTO: 0 /100 WBC
PLATELET # BLD AUTO: 311 K/UL (ref 164–446)
PMV BLD AUTO: 9.9 FL (ref 9–12.9)
POTASSIUM SERPL-SCNC: 4.4 MMOL/L (ref 3.6–5.5)
PROT SERPL-MCNC: 7.6 G/DL (ref 6–8.2)
RBC # BLD AUTO: 4.86 M/UL (ref 4.2–5.4)
SODIUM SERPL-SCNC: 139 MMOL/L (ref 135–145)
TRIGL SERPL-MCNC: 112 MG/DL (ref 0–149)
WBC # BLD AUTO: 6.5 K/UL (ref 4.8–10.8)

## 2019-08-31 PROCEDURE — 36415 COLL VENOUS BLD VENIPUNCTURE: CPT

## 2019-08-31 PROCEDURE — 85025 COMPLETE CBC W/AUTO DIFF WBC: CPT

## 2019-08-31 PROCEDURE — 80061 LIPID PANEL: CPT

## 2019-08-31 PROCEDURE — 80053 COMPREHEN METABOLIC PANEL: CPT

## 2019-09-19 ENCOUNTER — OFFICE VISIT (OUTPATIENT)
Dept: MEDICAL GROUP | Facility: PHYSICIAN GROUP | Age: 59
End: 2019-09-19
Payer: COMMERCIAL

## 2019-09-19 VITALS
OXYGEN SATURATION: 97 % | DIASTOLIC BLOOD PRESSURE: 60 MMHG | BODY MASS INDEX: 27.91 KG/M2 | WEIGHT: 151.68 LBS | HEART RATE: 86 BPM | SYSTOLIC BLOOD PRESSURE: 100 MMHG | HEIGHT: 62 IN | TEMPERATURE: 96.8 F

## 2019-09-19 DIAGNOSIS — Z23 NEED FOR IMMUNIZATION AGAINST INFLUENZA: ICD-10-CM

## 2019-09-19 DIAGNOSIS — I10 ESSENTIAL HYPERTENSION: ICD-10-CM

## 2019-09-19 DIAGNOSIS — J45.20 MILD INTERMITTENT ASTHMA WITHOUT COMPLICATION: ICD-10-CM

## 2019-09-19 DIAGNOSIS — E78.5 DYSLIPIDEMIA: ICD-10-CM

## 2019-09-19 DIAGNOSIS — Z12.39 SCREENING FOR BREAST CANCER: ICD-10-CM

## 2019-09-19 DIAGNOSIS — F43.23 SITUATIONAL MIXED ANXIETY AND DEPRESSIVE DISORDER: ICD-10-CM

## 2019-09-19 DIAGNOSIS — Z91.09 ENVIRONMENTAL ALLERGIES: ICD-10-CM

## 2019-09-19 PROCEDURE — 90471 IMMUNIZATION ADMIN: CPT | Performed by: FAMILY MEDICINE

## 2019-09-19 PROCEDURE — 99214 OFFICE O/P EST MOD 30 MIN: CPT | Mod: 25 | Performed by: FAMILY MEDICINE

## 2019-09-19 PROCEDURE — 90686 IIV4 VACC NO PRSV 0.5 ML IM: CPT | Performed by: FAMILY MEDICINE

## 2019-09-19 NOTE — PROGRESS NOTES
Subjective:      Emily Sanchez is a 59 y.o. female who presents with Annual Exam and Results (labs)    HPI:    The patient is here for follow up on her chronic medical problems.    Essential hypertension  Chronic and stable. She has been compliant with taking her losartan medication. Blood pressure today is stable at 100/60. She reports she has been walking 9,000 steps a day recently to keep her heart healthy.    Dyslipidemia  She continues to manage her dyslipidemia through her own efforts. Lab results from 8/31/19 show elevated LDL of 102 the rest at target.  The rest at target.. Her ASCVD risk score is 2%.    Mild intermittent asthma without complication  Patient has a history of asthma. States she is currently taking Symbicort regularly and albuterol as needed. She has been doing well overall in regards to her asthma.      Environmental allergens  She also follows up with her allergist and she continues to get immunotherapy.  Lately they have extended her immunotherapy from every 4 weeks to every 6 weeks since she has been doing much better.    Situational mixed anxiety and depressive disorder  She slowly took herself off the Lexapro and has not taken it anymore in the last 1 month.  She states she is no longer having any problems with her anxiety.There was an investigation on complaints she filed with regards to work discrimination. 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  She is happy with the outcome of the complaint that she filed.    Immunizations  She needs a flu shot.    Preventative Screenings  Patient is overdue for a mammogram.    Past medical history, past surgical history, family history reviewed-no changes    Social history reviewed-no changes    Allergies reviewed-no  "changes    Medications reviewed-no changes    ROS:  As per the HPI as shown above, the rest are negative.     Objective:     /60 (BP Location: Left arm, Patient Position: Sitting, BP Cuff Size: Adult)   Pulse 86   Temp 36 °C (96.8 °F) (Temporal)   Ht 1.575 m (5' 2\")   Wt 68.8 kg (151 lb 10.8 oz)   SpO2 97%   BMI 27.74 kg/m²     Physical Exam  Examined alert, awake, oriented, not in distress    Neck-supple, no lymphadenopathy, no thyromegaly  Lungs-clear to auscultation, no rales, no wheezes  Heart-regular rate and rhythm, no murmur  Extremities-no edema, clubbing, cyanosis    Labs:  Hospital Outpatient Visit on 08/31/2019   Component Date Value Ref Range Status   • Cholesterol,Tot 08/31/2019 193  100 - 199 mg/dL Final   • Triglycerides 08/31/2019 112  0 - 149 mg/dL Final   • HDL 08/31/2019 69  >=40 mg/dL Final   • LDL 08/31/2019 102* <100 mg/dL Final   • Sodium 08/31/2019 139  135 - 145 mmol/L Final   • Potassium 08/31/2019 4.4  3.6 - 5.5 mmol/L Final   • Chloride 08/31/2019 104  96 - 112 mmol/L Final   • Co2 08/31/2019 27  20 - 33 mmol/L Final   • Anion Gap 08/31/2019 8.0  0.0 - 11.9 Final   • Glucose 08/31/2019 95  65 - 99 mg/dL Final   • Bun 08/31/2019 14  8 - 22 mg/dL Final   • Creatinine 08/31/2019 0.71  0.50 - 1.40 mg/dL Final   • Calcium 08/31/2019 9.5  8.5 - 10.5 mg/dL Final   • AST(SGOT) 08/31/2019 21  12 - 45 U/L Final   • ALT(SGPT) 08/31/2019 23  2 - 50 U/L Final   • Alkaline Phosphatase 08/31/2019 66  30 - 99 U/L Final   • Total Bilirubin 08/31/2019 0.6  0.1 - 1.5 mg/dL Final   • Albumin 08/31/2019 4.5  3.2 - 4.9 g/dL Final   • Total Protein 08/31/2019 7.6  6.0 - 8.2 g/dL Final   • Globulin 08/31/2019 3.1  1.9 - 3.5 g/dL Final   • A-G Ratio 08/31/2019 1.5  g/dL Final   • WBC 08/31/2019 6.5  4.8 - 10.8 K/uL Final   • RBC 08/31/2019 4.86  4.20 - 5.40 M/uL Final   • Hemoglobin 08/31/2019 14.3  12.0 - 16.0 g/dL Final   • Hematocrit 08/31/2019 45.7  37.0 - 47.0 % Final   • MCV 08/31/2019 94.0  " 81.4 - 97.8 fL Final   • MCH 08/31/2019 29.4  27.0 - 33.0 pg Final   • MCHC 08/31/2019 31.3* 33.6 - 35.0 g/dL Final   • RDW 08/31/2019 41.4  35.9 - 50.0 fL Final   • Platelet Count 08/31/2019 311  164 - 446 K/uL Final   • MPV 08/31/2019 9.9  9.0 - 12.9 fL Final   • Neutrophils-Polys 08/31/2019 50.30  44.00 - 72.00 % Final   • Lymphocytes 08/31/2019 35.90  22.00 - 41.00 % Final   • Monocytes 08/31/2019 6.00  0.00 - 13.40 % Final   • Eosinophils 08/31/2019 6.50  0.00 - 6.90 % Final   • Basophils 08/31/2019 1.10  0.00 - 1.80 % Final   • Immature Granulocytes 08/31/2019 0.20  0.00 - 0.90 % Final   • Nucleated RBC 08/31/2019 0.00  /100 WBC Final   • Neutrophils (Absolute) 08/31/2019 3.25  2.00 - 7.15 K/uL Final    Includes immature neutrophils, if present.   • Lymphs (Absolute) 08/31/2019 2.32  1.00 - 4.80 K/uL Final   • Monos (Absolute) 08/31/2019 0.39  0.00 - 0.85 K/uL Final   • Eos (Absolute) 08/31/2019 0.42  0.00 - 0.51 K/uL Final   • Baso (Absolute) 08/31/2019 0.07  0.00 - 0.12 K/uL Final   • Immature Granulocytes (abs) 08/31/2019 0.01  0.00 - 0.11 K/uL Final   • NRBC (Absolute) 08/31/2019 0.00  K/uL Final   • Fasting Status 08/31/2019 Fasting   Final   • GFR If  08/31/2019 >60  >60 mL/min/1.73 m 2 Final   • GFR If Non  08/31/2019 >60  >60 mL/min/1.73 m 2 Final     Assessment/Plan:     1. Situational mixed anxiety and depressive disorder  These have already resolved.  No more stressor that caused her anxiety depression.  Patient is no longer taking Lexapro and is no longer seeing a counselor.     2. Essential hypertension  Blood pressure is stable and within goal on current medications. We will continue the current dosages. I have advised her to avoid salty foods and exercise regularly. Labs have been ordered for the next follow up visit.   - Lipid Profile; Future  - Comp Metabolic Panel; Future    3. Dyslipidemia  Her lipid panel shows elevated LDL cholesterol of 102.  10-year ASCVD  risk is only at 2% therefore she does not need any medication.  I have advised the patient to increase her exercise regimen and avoid fatty foods. Labs have been ordered for the next follow up visit.   - Lipid Profile; Future  - Comp Metabolic Panel; Future    4. Mild intermittent asthma without complication  Stable on inhalers.  Continue follow-up with pulmonary specialist.    5. Environmental allergies  Chronic and stable.  She follows up with her allergist and getting immunotherapy.    6. Need for immunization against influenza  Flu shot was given.  - Influenza Vaccine Quad Injection (PF)    7. Screening for breast cancer  Breast cancer screening test has been ordered. She will call and schedule an appointment.  - MA-SCREEN MAMMO W/CAD-BILAT; Future    IHugh (Scribe), am scribing for, and in the presence of, Noni Jansen MD     Electronically signed by: Hugh Youssef (Scribe), 9/19/2019    Noni DEL ANGEL MD personally performed the services described in this documentation, as scribed by Hugh Youssef in my presence, and it is both accurate and complete.

## 2019-10-14 ENCOUNTER — OFFICE VISIT (OUTPATIENT)
Dept: PULMONOLOGY | Facility: HOSPICE | Age: 59
End: 2019-10-14
Payer: COMMERCIAL

## 2019-10-14 VITALS
DIASTOLIC BLOOD PRESSURE: 62 MMHG | SYSTOLIC BLOOD PRESSURE: 100 MMHG | WEIGHT: 149 LBS | HEART RATE: 82 BPM | RESPIRATION RATE: 14 BRPM | HEIGHT: 62 IN | TEMPERATURE: 98 F | BODY MASS INDEX: 27.42 KG/M2 | OXYGEN SATURATION: 95 %

## 2019-10-14 DIAGNOSIS — J47.9 BRONCHIECTASIS WITHOUT COMPLICATION (HCC): ICD-10-CM

## 2019-10-14 DIAGNOSIS — J45.20 MILD INTERMITTENT ASTHMA WITHOUT COMPLICATION: ICD-10-CM

## 2019-10-14 DIAGNOSIS — Z86.11 HISTORY OF TUBERCULOSIS: ICD-10-CM

## 2019-10-14 PROCEDURE — 99214 OFFICE O/P EST MOD 30 MIN: CPT | Performed by: INTERNAL MEDICINE

## 2019-10-14 ASSESSMENT — PAIN SCALES - GENERAL: PAINLEVEL: NO PAIN

## 2019-10-14 NOTE — PROGRESS NOTES
Chief Complaint   Patient presents with   • Follow-Up     History of Asthma       HPI:  The patient is a 59-year-old woman.  She has a remote history of tuberculosis some 30 years ago, treated in [place] with medications over a period of a year.  She has a history of chronic asthma, followed by Dr. Haines.  She had been on Symbicort 160/4.5 at 2 puffs twice a day in addition to an albuterol metered-dose inhaler.  Her Symbicort dose has been decreased to 80/4.5.  At this time her symptoms are stable with chronic cough that produces only occasional amounts of clear sputum without purulence or hemoptysis.  She enjoys reasonable exercise tolerance and is not awakening at night with respiratory symptoms.  Recent pulmonary function studies demonstrated some decrease in mid maximal expiratory flow rates and some possible hyperinflation. She has been getting allergy immunotherapy for the past 2 years and at this time feels that her breathing is much better.  She has no history of fever, chills, or night sweats     In addition she has a mild chronic cough and radiographic evidence for at least mild bronchiectasis involving primarily the right middle lobe with some scarring at both lung bases.   She has been doing quite well over the past year.  She did not continue use of the Spiriva.  She had a chest x-ray in July which showed no change.       Past Medical History:   Diagnosis Date   • Bronchiectasis (HCC)    • Bronchitis    • Environmental allergies 7/3/2013   • Hepatitis B carrier (HCC)    • History of pneumothorax    • Hypertension    • Pneumonia    • Positive TB test     treated 1990's   • Tuberculosis    • Tuberculosis exposure        ROS:   Constitutional: Denies fevers, chills, night sweats, fatigue or weight loss  Eyes: Denies vision loss, pain, drainage, double vision  Ears, Nose, Throat: Denies earache, tinnitus, hoarseness  Cardiovascular: Denies chest pain, tightness, palpitations  Respiratory: See HPI  Sleep:  Denies, snoring, apnea  GI: Denies abdominal pain, nausea, vomiting, diarrhea  : Denies frequent urination, hematuria, painful urination  Musculoskeletal: Denies back pain, painful joints, sore muscles  Neurological: Denies headaches, seizures  Skin: Denies rashes, color changes  Psychiatric: Denies depression or thoughts of suicide  Hematologic: Denies bleeding tendency or clotting tendency  Allergic/Immunologic: Denies rhinitis, skin sensitivity    Social History     Socioeconomic History   • Marital status:      Spouse name: Not on file   • Number of children: 3   • Years of education: Not on file   • Highest education level: Not on file   Occupational History   • Occupation: nutrition services   Social Needs   • Financial resource strain: Not on file   • Food insecurity:     Worry: Not on file     Inability: Not on file   • Transportation needs:     Medical: Not on file     Non-medical: Not on file   Tobacco Use   • Smoking status: Never Smoker   • Smokeless tobacco: Never Used   Substance and Sexual Activity   • Alcohol use: Yes     Alcohol/week: 0.0 oz     Comment: once a month   • Drug use: No   • Sexual activity: Yes     Partners: Male   Lifestyle   • Physical activity:     Days per week: Not on file     Minutes per session: Not on file   • Stress: Not on file   Relationships   • Social connections:     Talks on phone: Not on file     Gets together: Not on file     Attends Taoist service: Not on file     Active member of club or organization: Not on file     Attends meetings of clubs or organizations: Not on file     Relationship status: Not on file   • Intimate partner violence:     Fear of current or ex partner: Not on file     Emotionally abused: Not on file     Physically abused: Not on file     Forced sexual activity: Not on file   Other Topics Concern   • Not on file   Social History Narrative   • Not on file     Amoxicillin-pot clavulanate; Seasonal; and Clavulanic acid  Current Outpatient  "Medications on File Prior to Visit   Medication Sig Dispense Refill   • losartan (COZAAR) 50 MG Tab TAKE 1/2 TABLET BY MOUTH IN THE MORNING THEN TAKE 1 TABLET IN THE EVENING 135 Tab 1   • SYMBICORT 80-4.5 MCG/ACT Aerosol INHALE 1 TO 2 PUFFS BY MOUTH 2 TIMES A DAY. RINSE MOUTH WITH WATER AFTER EACH USE  4   • montelukast (SINGULAIR) 10 MG Tab TAKE 1 TABLET BY MOUTH EVERYDAY AT BEDTIME  4   • albuterol 108 (90 Base) MCG/ACT Aero Soln inhalation aerosol Inhale 2 Puffs by mouth every 6 hours as needed for Shortness of Breath. 1 Inhaler 2   • Omega-3 Fatty Acids (FISH OIL) 1000 MG Cap capsule Take 1,000 mg by mouth every day.     • Cholecalciferol (VITAMIN D) 2000 units Cap Take 2,000 Units by mouth every day.     • acetaminophen (TYLENOL) 500 MG Tab Take 1,000 mg by mouth every 8 hours as needed.     • budesonide-formoterol (SYMBICORT) 160-4.5 MCG/ACT Aerosol Inhale 2 Puffs by mouth 2 Times a Day. Rinse mouth after each use 1 Inhaler 5   • fluticasone (FLONASE) 50 MCG/ACT nasal spray Spray 1 Spray in nose 2 times a day. 1 Bottle 3   • Multiple Vitamin (MULTIVITAMIN) capsule Take 1 Cap by mouth every day. 100 Cap 0   • aspirin (ASA) 81 MG CHEW chewable tablet Take 1 Tab by mouth every day. 100 Tab 11   • loratadine (CLARITIN) 10 MG TABS Take 10 mg by mouth every day.     • benzonatate (TESSALON) 100 MG Cap Take 1 Cap by mouth 3 times a day as needed for Cough. 60 Cap 0     No current facility-administered medications on file prior to visit.      /62   Pulse 82   Temp 36.7 °C (98 °F) (Oral)   Resp 14   Ht 1.575 m (5' 2\")   Wt 67.6 kg (149 lb)   SpO2 95%   Family History   Problem Relation Age of Onset   • Heart Disease Mother    • Diabetes Mother    • Heart Disease Father    • Hypertension Sister    • Other Sister         1 sister - kidney transplant   • Diabetes Sister    • Diabetes Brother         1 brother   • Drug abuse Brother    • Kidney Disease Brother        Physical Exam:    HEENT: PERWILLIE, YINGMI, no " scleral icterus, no nasal or oral lesions  Neck: No thyromegaly, no adenopathy, no bruits  Mallampatti: Grade II  Lungs: Equal breath sounds, no wheezes or crackles  Heart: Regular rate and rhythm, no gallops or murmurs  Abdomen: Soft, benign, no organomegaly  Extremities: No clubbing, cyanosis, or edema  Neurologic: Cranial nerve, motor, and sensory exam are normal    1. Mild intermittent asthma without complication    2. Bronchiectasis without complication (HCC)    3. History of tuberculosis        She has been very stable over the past several years.  Her Symbicort dosage has been reduced to 80/4.5.  She still uses 2 puffs twice daily.  I think she could go to 1 puff twice daily.  If her symptoms seem to get a bit worse she can go back to the prior dose.  She will continue to follow-up with allergy.  We will see her back in a year or sooner if she has issues.

## 2019-10-18 ENCOUNTER — HOSPITAL ENCOUNTER (OUTPATIENT)
Dept: RADIOLOGY | Facility: MEDICAL CENTER | Age: 59
End: 2019-10-18
Attending: FAMILY MEDICINE
Payer: COMMERCIAL

## 2019-10-18 DIAGNOSIS — Z12.39 SCREENING FOR BREAST CANCER: ICD-10-CM

## 2019-10-18 PROCEDURE — 77063 BREAST TOMOSYNTHESIS BI: CPT

## 2019-11-19 ENCOUNTER — OFFICE VISIT (OUTPATIENT)
Dept: URGENT CARE | Facility: CLINIC | Age: 59
End: 2019-11-19
Payer: COMMERCIAL

## 2019-11-19 ENCOUNTER — APPOINTMENT (OUTPATIENT)
Dept: RADIOLOGY | Facility: IMAGING CENTER | Age: 59
End: 2019-11-19
Attending: PHYSICIAN ASSISTANT
Payer: COMMERCIAL

## 2019-11-19 VITALS
BODY MASS INDEX: 27.79 KG/M2 | HEIGHT: 62 IN | HEART RATE: 82 BPM | SYSTOLIC BLOOD PRESSURE: 122 MMHG | OXYGEN SATURATION: 95 % | WEIGHT: 151 LBS | DIASTOLIC BLOOD PRESSURE: 82 MMHG | TEMPERATURE: 97.2 F | RESPIRATION RATE: 16 BRPM

## 2019-11-19 DIAGNOSIS — M77.8 LEFT SHOULDER TENDINITIS: ICD-10-CM

## 2019-11-19 PROCEDURE — 73030 X-RAY EXAM OF SHOULDER: CPT | Mod: TC,LT | Performed by: PHYSICIAN ASSISTANT

## 2019-11-19 PROCEDURE — 99214 OFFICE O/P EST MOD 30 MIN: CPT | Performed by: PHYSICIAN ASSISTANT

## 2019-11-19 ASSESSMENT — ENCOUNTER SYMPTOMS
MUSCLE WEAKNESS: 0
TINGLING: 0
NUMBNESS: 0

## 2019-11-19 NOTE — PROGRESS NOTES
Subjective:   Emily Sanchez is a 59 y.o. female who presents today with   Chief Complaint   Patient presents with   • Arm Pain     x 2 months, pain on Lt. upper arm after flu vaccine, pain is constant       Arm Pain    The incident occurred more than 1 week ago. There was no injury mechanism. The pain is present in the left shoulder. The quality of the pain is described as aching. The pain radiates to the left arm. The pain is moderate. The pain has been constant since the incident. Pertinent negatives include no chest pain, muscle weakness, numbness or tingling. The symptoms are aggravated by movement. She has tried NSAIDs for the symptoms. The treatment provided mild relief.   Patient began having left shoulder pain radiating into her upper arm in the area where she had a flu shot which has persisted on for the past 2 months.  She denies any trauma or previous injury to the shoulder.    PMH:  has a past medical history of Bronchiectasis (HCC), Bronchitis, Environmental allergies (7/3/2013), Hepatitis B carrier (HCC), History of pneumothorax, Hypertension, Pneumonia, Positive TB test, Tuberculosis, and Tuberculosis exposure.  MEDS:   Current Outpatient Medications:   •  losartan (COZAAR) 50 MG Tab, TAKE 1/2 TABLET BY MOUTH IN THE MORNING THEN TAKE 1 TABLET IN THE EVENING, Disp: 135 Tab, Rfl: 1  •  SYMBICORT 80-4.5 MCG/ACT Aerosol, INHALE 1 TO 2 PUFFS BY MOUTH 2 TIMES A DAY. RINSE MOUTH WITH WATER AFTER EACH USE, Disp: , Rfl: 4  •  montelukast (SINGULAIR) 10 MG Tab, TAKE 1 TABLET BY MOUTH EVERYDAY AT BEDTIME, Disp: , Rfl: 4  •  albuterol 108 (90 Base) MCG/ACT Aero Soln inhalation aerosol, Inhale 2 Puffs by mouth every 6 hours as needed for Shortness of Breath., Disp: 1 Inhaler, Rfl: 2  •  Omega-3 Fatty Acids (FISH OIL) 1000 MG Cap capsule, Take 1,000 mg by mouth every day., Disp: , Rfl:   •  Cholecalciferol (VITAMIN D) 2000 units Cap, Take 2,000 Units by mouth every day., Disp: , Rfl:   •  acetaminophen  "(TYLENOL) 500 MG Tab, Take 1,000 mg by mouth every 8 hours as needed., Disp: , Rfl:   •  budesonide-formoterol (SYMBICORT) 160-4.5 MCG/ACT Aerosol, Inhale 2 Puffs by mouth 2 Times a Day. Rinse mouth after each use, Disp: 1 Inhaler, Rfl: 5  •  fluticasone (FLONASE) 50 MCG/ACT nasal spray, Spray 1 Spray in nose 2 times a day., Disp: 1 Bottle, Rfl: 3  •  Multiple Vitamin (MULTIVITAMIN) capsule, Take 1 Cap by mouth every day., Disp: 100 Cap, Rfl: 0  •  aspirin (ASA) 81 MG CHEW chewable tablet, Take 1 Tab by mouth every day., Disp: 100 Tab, Rfl: 11  •  loratadine (CLARITIN) 10 MG TABS, Take 10 mg by mouth every day., Disp: , Rfl:   •  benzonatate (TESSALON) 100 MG Cap, Take 1 Cap by mouth 3 times a day as needed for Cough. (Patient not taking: Reported on 11/19/2019), Disp: 60 Cap, Rfl: 0  ALLERGIES:   Allergies   Allergen Reactions   • Amoxicillin-Pot Clavulanate Hives   • Seasonal    • Clavulanic Acid Hives and Nausea     SURGHX:   Past Surgical History:   Procedure Laterality Date   • CATARACT EXTRACTION WITH IOL     • LIVER BIOPSY     • TUBAL LIGATION       SOCHX:  reports that she has never smoked. She has never used smokeless tobacco. She reports current alcohol use. She reports that she does not use drugs.  FH: Reviewed with patient, not pertinent to this visit.       Review of Systems   Cardiovascular: Negative for chest pain.   Musculoskeletal:        Left shoulder pain   Neurological: Negative for tingling and numbness.   All other systems reviewed and are negative.       Objective:   /82 (BP Location: Right arm, Patient Position: Sitting, BP Cuff Size: Adult)   Pulse 82   Temp 36.2 °C (97.2 °F) (Temporal)   Resp 16   Ht 1.575 m (5' 2\")   Wt 68.5 kg (151 lb)   SpO2 95%   BMI 27.62 kg/m²   Physical Exam  Vitals signs and nursing note reviewed.   Constitutional:       General: She is not in acute distress.     Appearance: She is well-developed.   HENT:      Head: Normocephalic and atraumatic.      " Right Ear: Hearing normal.      Left Ear: Hearing normal.   Eyes:      Pupils: Pupils are equal, round, and reactive to light.   Cardiovascular:      Rate and Rhythm: Normal rate and regular rhythm.      Heart sounds: Normal heart sounds.   Pulmonary:      Effort: Pulmonary effort is normal.      Breath sounds: Normal breath sounds.   Musculoskeletal:      Comments: Pain in the left shoulder with abduction and adduction against resistance.  Patient has decreased range of motion in her left shoulder.  Tenderness to palpation over the deltoid and into the shoulder.    Skin:     General: Skin is warm and dry.   Neurological:      Mental Status: She is alert.      Coordination: Coordination normal.   Psychiatric:         Mood and Affect: Mood normal.     DX SHOULDER  FINDINGS:  Bone mineralization is normal.  There is no evidence of fracture or dislocation.  There is degenerative change of the acromioclavicular joint.  There is calcific tendinitis of the rotator cuff. There is mild degenerative change of glenohumeral joint characterized by small osteophytes.     IMPRESSION:     1.  Mild degenerative change. No evidence of fracture.     2.  Calcific tendinitis of the rotator cuff.  Assessment/Plan:   Assessment    1. Left shoulder tendinitis  - DX-SHOULDER 2+ LEFT; Future  - REFERRAL TO PHYSICAL THERAPY Reason for Therapy: Eval/Treat/Report  - REFERRAL TO SPORTS MEDICINE  Encourage patient to try physical therapy as well as over-the-counter Aleve and use heat on the area to help with her pain.  Follow-up with sports medicine as needed.  Differential diagnosis, natural history, supportive care, and indications for immediate follow-up discussed.   Patient given instructions and understanding of medications and treatment.    If not improving in 3-5 days, F/U with PCP or return to  if symptoms worsen.    Patient agreeable to plan.      Please note that this dictation was created using voice recognition software. I have  made every reasonable attempt to correct obvious errors, but I expect that there are errors of grammar and possibly content that I did not discover before finalizing the note.    Eduar Calvillo PA-C

## 2019-11-25 ENCOUNTER — OFFICE VISIT (OUTPATIENT)
Dept: MEDICAL GROUP | Facility: CLINIC | Age: 59
End: 2019-11-25
Payer: COMMERCIAL

## 2019-11-25 VITALS
TEMPERATURE: 97.6 F | BODY MASS INDEX: 27.79 KG/M2 | SYSTOLIC BLOOD PRESSURE: 116 MMHG | RESPIRATION RATE: 16 BRPM | DIASTOLIC BLOOD PRESSURE: 76 MMHG | HEART RATE: 76 BPM | WEIGHT: 151 LBS | OXYGEN SATURATION: 97 % | HEIGHT: 62 IN

## 2019-11-25 DIAGNOSIS — M75.32 CALCIFIC TENDINITIS OF LEFT SHOULDER: ICD-10-CM

## 2019-11-25 PROCEDURE — 99203 OFFICE O/P NEW LOW 30 MIN: CPT | Mod: 25 | Performed by: FAMILY MEDICINE

## 2019-11-25 PROCEDURE — 20610 DRAIN/INJ JOINT/BURSA W/O US: CPT | Mod: LT | Performed by: FAMILY MEDICINE

## 2019-11-25 RX ORDER — TRIAMCINOLONE ACETONIDE 40 MG/ML
40 INJECTION, SUSPENSION INTRA-ARTICULAR; INTRAMUSCULAR ONCE
Status: COMPLETED | OUTPATIENT
Start: 2019-11-25 | End: 2019-11-25

## 2019-11-25 RX ADMIN — TRIAMCINOLONE ACETONIDE 40 MG: 40 INJECTION, SUSPENSION INTRA-ARTICULAR; INTRAMUSCULAR at 18:11

## 2019-11-26 NOTE — PROGRESS NOTES
CHIEF COMPLAINT:  Emily Sanchez female presenting at the request of Eduar Calvillo PA-C for evaluation of Shoulder pain.     Emily Sanchez is complaining of left shoulder pain  Symptoms began insidiously approximately 3 months ago (around September 2019)  Symptoms began insidiously and has been getting worse over time  Pain is at the deltoid region  Quality is aching and can be sharp  Pain is Non-radiating  Aggravated by movements such as cleaning tables and at the end of the day as well as sleeping on the LEFT side  Improved with  Elevating the shoulder and avoiding movement/rest   no prior problems with this shoulder in the past  Prior Treatments: Seen at urgent care  Prior studies: X-Ray   Medications tried for pain include: ibuprofen (OTC), which helps  Mechanical Symptom history: No Locking    REVIEW OF SYSTEMS  No Nausea, No Vomiting, No Chest Pain, No Shortness of Breath, No Dizziness, No Headache    PAST MEDICAL HISTORY:   History reviewed. No pertinent past medical history.    PMH:  has a past medical history of Bronchiectasis (HCC), Bronchitis, Environmental allergies (7/3/2013), Hepatitis B carrier (HCC), History of pneumothorax, Hypertension, Pneumonia, Positive TB test, Tuberculosis, and Tuberculosis exposure.  MEDS:   Current Outpatient Medications:   •  losartan (COZAAR) 50 MG Tab, TAKE 1/2 TABLET BY MOUTH IN THE MORNING THEN TAKE 1 TABLET IN THE EVENING, Disp: 135 Tab, Rfl: 1  •  SYMBICORT 80-4.5 MCG/ACT Aerosol, INHALE 1 TO 2 PUFFS BY MOUTH 2 TIMES A DAY. RINSE MOUTH WITH WATER AFTER EACH USE, Disp: , Rfl: 4  •  montelukast (SINGULAIR) 10 MG Tab, TAKE 1 TABLET BY MOUTH EVERYDAY AT BEDTIME, Disp: , Rfl: 4  •  benzonatate (TESSALON) 100 MG Cap, Take 1 Cap by mouth 3 times a day as needed for Cough. (Patient not taking: Reported on 11/19/2019), Disp: 60 Cap, Rfl: 0  •  albuterol 108 (90 Base) MCG/ACT Aero Soln inhalation aerosol, Inhale 2 Puffs by mouth every 6 hours as needed for  "Shortness of Breath., Disp: 1 Inhaler, Rfl: 2  •  Omega-3 Fatty Acids (FISH OIL) 1000 MG Cap capsule, Take 1,000 mg by mouth every day., Disp: , Rfl:   •  Cholecalciferol (VITAMIN D) 2000 units Cap, Take 2,000 Units by mouth every day., Disp: , Rfl:   •  acetaminophen (TYLENOL) 500 MG Tab, Take 1,000 mg by mouth every 8 hours as needed., Disp: , Rfl:   •  budesonide-formoterol (SYMBICORT) 160-4.5 MCG/ACT Aerosol, Inhale 2 Puffs by mouth 2 Times a Day. Rinse mouth after each use, Disp: 1 Inhaler, Rfl: 5  •  fluticasone (FLONASE) 50 MCG/ACT nasal spray, Spray 1 Spray in nose 2 times a day., Disp: 1 Bottle, Rfl: 3  •  Multiple Vitamin (MULTIVITAMIN) capsule, Take 1 Cap by mouth every day., Disp: 100 Cap, Rfl: 0  •  aspirin (ASA) 81 MG CHEW chewable tablet, Take 1 Tab by mouth every day., Disp: 100 Tab, Rfl: 11  •  loratadine (CLARITIN) 10 MG TABS, Take 10 mg by mouth every day., Disp: , Rfl:   ALLERGIES:   Allergies   Allergen Reactions   • Amoxicillin-Pot Clavulanate Hives   • Seasonal    • Clavulanic Acid Hives and Nausea     SURGHX:   Past Surgical History:   Procedure Laterality Date   • CATARACT EXTRACTION WITH IOL     • LIVER BIOPSY     • TUBAL LIGATION       SOCHX:  reports that she has never smoked. She has never used smokeless tobacco. She reports current alcohol use. She reports that she does not use drugs.  FH: Family history was reviewed, no pertinent findings to report     PHYSICAL EXAM:  /76 (BP Location: Right arm, Patient Position: Sitting, BP Cuff Size: Adult)   Pulse 76   Temp 36.4 °C (97.6 °F) (Temporal)   Resp 16   Ht 1.575 m (5' 2\")   Wt 68.5 kg (151 lb)   SpO2 97%   BMI 27.62 kg/m²      well-developed, well-nourished in no apparent distress, alert and oriented x 3.  Gait: normal    Cervical spine:  Range of motion Intact  Spurling's testing is NEGATIVE  Cervical spine tenderness NEGATIVE    Strength testing:     Deltoid, bilateral 5/5  Bicep, bilateral 5/5  Tricep, bilateral " 5/5  Wrist Extension, bilateral 5/5  Wrist Flexion, bilateral 5/5  Finger Abduction, bilateral 5/5    Sensation:  INTACT Bilaterally    Reflexes:   Biceps: R 2+/L 2+  Triceps: R 2+/L  2+  Brachial radialis R 2+/L  2+  Perez's testing is NEGATIVE  The arms are otherwise neurovascularly intact     Shoulder Exam:    RIGHT Shoulder:  No visible swelling   Range of motion INTACT  Tenderness: Non-tender  Empty Can Testing 5/5  Internal Rotation 5/5  External Rotation 5/5  Lift Off Testing 5/5  Impingement testing Chase  NEGATIVE  Neer's testing NEGATIVE  Apprehension testing NEGATIVE    LEFT Shoulder:  No visible swelling   Range of motion INTACT  Tenderness: Non-tender  Empty Can Testing 5/5  Internal Rotation 5/5  External Rotation 5/5  Lift Off Testing 5/5  Impingement testing Chase  POSITIVE  Neer's testing POSITIVE  Apprehension testing POSITIVE    Additional Findings: None    1. Calcific tendinitis of left shoulder  triamcinolone acetonide (KENALOG-40) injection 40 mg       Discussed treatment options including formal physical therapy, corticosteroid injection and home exercise program     PT [place] location ordered already through urgent care    LEFT subacromial corticosteroid injection performed in the office TODAY (November 25, 2019)      Return in about 4 weeks (around 12/23/2019).  To see how she is doing with her home exercise program, formal physical therapy and after LEFT subacromial corticosteroid injection        11/19/2019 2:34 PM     HISTORY/REASON FOR EXAM:  Left shoulder pain.     TECHNIQUE/EXAM DESCRIPTION AND NUMBER OF VIEWS:  3 views of the LEFT shoulder.     COMPARISON: None     FINDINGS:  Bone mineralization is normal.  There is no evidence of fracture or dislocation.  There is degenerative change of the acromioclavicular joint.  There is calcific tendinitis of the rotator cuff. There is mild degenerative change of glenohumeral joint characterized by small osteophytes.     IMPRESSION:     1.   Mild degenerative change. No evidence of fracture.     2.  Calcific tendinitis of the rotator cuff.    done elsewhere and reviewed independently by me    Thank you Eduar Calvillo PA-C for allowing me to participate in caring for your patient.

## 2019-11-26 NOTE — PROCEDURES
PROCEDURE NOTE:  left Shoulder subacromial injection  Risks and benefits discussed  Informed consent obtained  Shoulder prepped in sterile fashion utilizing a posterior approach  40 mg of Kenalog and 5 cc of bupivacaine injected into the subacromial space  Vapocoolant spray was utilized  Patient tolerated the procedure well  Postprocedure care and red flags discussed

## 2019-11-26 NOTE — PROGRESS NOTES
"Subjective:      Emily Sanchez is a 59 y.o. female who presents with Shoulder Pain (Referral from UC/ L shoulder pain )            HPI    ROS       Objective:     /76 (BP Location: Right arm, Patient Position: Sitting, BP Cuff Size: Adult)   Pulse 76   Temp 36.4 °C (97.6 °F) (Temporal)   Resp 16   Ht 1.575 m (5' 2\")   Wt 68.5 kg (151 lb)   SpO2 97%   BMI 27.62 kg/m²      Physical Exam            Assessment/Plan:       There are no diagnoses linked to this encounter.    "

## 2019-12-16 ENCOUNTER — PHYSICAL THERAPY (OUTPATIENT)
Dept: PHYSICAL THERAPY | Facility: REHABILITATION | Age: 59
End: 2019-12-16
Attending: PHYSICIAN ASSISTANT
Payer: COMMERCIAL

## 2019-12-16 DIAGNOSIS — M77.8 LEFT SHOULDER TENDINITIS: ICD-10-CM

## 2019-12-16 PROCEDURE — 97161 PT EVAL LOW COMPLEX 20 MIN: CPT

## 2019-12-16 PROCEDURE — 97110 THERAPEUTIC EXERCISES: CPT

## 2019-12-16 SDOH — ECONOMIC STABILITY: GENERAL: QUALITY OF LIFE: GOOD

## 2019-12-16 ASSESSMENT — ENCOUNTER SYMPTOMS
EXACERBATED BY: CARRYING
QUALITY: SHOOTING
PAIN SCALE: 0
ALLEVIATING FACTORS: PAIN MEDICATION
EXACERBATED BY: LIFTING
PAIN SCALE AT LOWEST: 0
PAIN SCALE AT HIGHEST: 4
ALLEVIATING FACTORS: ICE
QUALITY: ACHING
ALLEVIATING FACTORS: STRETCHING
QUALITY: DISCOMFORT
ALLEVIATING FACTORS: HEAT APPLICATION
PAIN TIMING: EVERY EVENING
ALLEVIATING FACTORS: CHANGE IN POSITION

## 2019-12-16 NOTE — OP THERAPY EVALUATION
Outpatient Physical Therapy  INITIAL EVALUATION    [facility] [place] [id number] [facility], [address]  [place] [address]  Phone:  [phone]    Date of Evaluation: 2019    Patient: Emily Sanchez  YOB: 1960  MRN: [id number]     Referring Provider: Eduar Calvillo P.A.-C.  [address]  [address]  [place] [address]   Referring Diagnosis Left shoulder tendinitis [M75.82]     Time Calculation  Start time: 1430  Stop time: 1530 Time Calculation (min): 60 minutes       Physical Therapy Occurrence Codes    Date of onset of impairment:  19   Date physical therapy care plan established or reviewed:  19   Date physical therapy treatment started:  19          Chief Complaint: Shoulder Problem    Visit Diagnoses     ICD-10-CM   1. Left shoulder tendinitis M75.82         Subjective:   History of Present Illness:     Date of onset:  2019    Mechanism of injury:  The patient is a 59 year old female who reports (L) shoulder pain which began almost 3 months ago in September. 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 She works with her (L) UE with most activity. She recently had a cortisone injection to the (L) shoulder which has helped decrease her (L) shoulder pain but she continues to have some pain with functional movement.  Quality of life:  Good  Sleep disturbance:  Not disrupted  Pain:     Current pain ratin    At best pain ratin    At worst pain ratin    Quality:  Aching, discomfort and shooting    Pain timing:  Every evening    Relieving factors:  Change in position, heat application, ice, pain medication and stretching    Aggravating factors:  Carrying and lifting    Pain Comments::  Reaching behind her low back to put her apron on; carrying heavy items at school or home; opening a jar with her (L)  hand  Social Support:     Lives in:  Multiple-level home    Lives with:  Spouse  Hand dominance:  Left  Treatments:     Previous treatment:  Injection treatment    Current treatment:  Ice and steroid injection  Patient Goals:     Patient goals for therapy:  Increased strength, increased motion and decreased pain    Other patient goals:  Putting on her apron and reaching behind with less or no pain      Past Medical History:   Diagnosis Date   • Bronchiectasis (HCC)    • Bronchitis    • Environmental allergies 7/3/2013   • Hepatitis B carrier (HCC)    • History of pneumothorax    • Hypertension    • Pneumonia    • Positive TB test     treated 1990's   • Tuberculosis    • Tuberculosis exposure      Past Surgical History:   Procedure Laterality Date   • CATARACT EXTRACTION WITH IOL     • LIVER BIOPSY     • TUBAL LIGATION       Social History     Tobacco Use   • Smoking status: Never Smoker   • Smokeless tobacco: Never Used   Substance Use Topics   • Alcohol use: Yes     Alcohol/week: 0.0 oz     Comment: once a month     Family and Occupational History     Patient does not qualify to have social determinant information on file (likely too young).   Socioeconomic History   • Marital status:      Spouse name: Not on file   • Number of children: 3   • Years of education: Not on file   • Highest education level: Not on file   Occupational History   • Occupation: nutrition services       Objective     Static Posture     Shoulders  Asymmetric shoulders.    Active Range of Motion   Left Shoulder   Flexion: WFL  Extension: WFL  Abduction: WFL  External rotation 90°: with pain  Internal rotation 90°: with pain    Right Shoulder   Normal active range of motion    Additional Active Range of Motion Details  Difficulty reaching back behind to put her apron at T5-6      Strength:      Left Shoulder   Planes of Motion   Flexion: 5   Extension: 5   Abduction: 5   Adduction: 5   External rotation at 90°: 5   External rotation  BTH: 5   Horizontal abduction: 5     Right Shoulder   Normal muscle strength    Tests     Left Shoulder   Positive Hawkin's and Neer's.     Additional Tests Details  Very mild sensitivity with special testing; reaching behind low back still sensitive to (L) anterior posterior shoulder          Therapeutic Exercises (CPT 13974):     1. Sleepers stretch, 4 x 30 sec     2. Hand behind back , 3 x30 sec       Time-based treatments/modalities:  Therapeutic exercise minutes (CPT 96519): 8 minutes       Assessment, Response and Plan:   Impairments: activity intolerance, impaired physical strength and limited mobility    Assessment details:  The patient is a 59 year old female who reports having (L) shoulder pain. Recently treated with cortisone injection ~ 1 week earlier and reports decreased pain to the (L) shoulder and some shoulder activity toleration which had previously been affected. She continues to have some pain and limitation reaching behind her low back and across her body in horizontal abduction. AROM in (L) IR at T4-5. Special testing indicates (+) Chase and Neer testing. She would like to increase her mobility and strength to her (L) shoulder to continue to progress with activity toleration. The patient would benefit from skilled Physical Therapy working on strength and conditioning, AROM and functional activity improving activity toleration.   Barriers to therapy:  Communication  Prognosis: good    Goals:   Short Term Goals:   1) Indep with HEP   2) Able to reach behind her low back with less pain by 1-2 levels   3) Lifting tubs of food at work more frequently with less fatigue response   Short term goal time span:  1-2 weeks      Long Term Goals:    1) Progressions/regressions advancing in HEP   2) Improved AROM to the (L) UE in IR to T3-4 less restriction and pain   3) Can open a jar using her (L) hand   Long term goal time span:  2-4 weeks    Plan:   Therapy options:  Physical therapy treatment to  continue  Planned therapy interventions:  E Stim Attended (CPT 85904), Functional Training, Self Care (CPT 12024), Manual Therapy (CPT 12454), Neuromuscular Re-education (CPT 15522), Self Care ADL Training (CPT 64953), Therapeutic Activities (CPT 11579) and Therapeutic Exercise (CPT 42985)  Frequency:  2x week  Duration in weeks:  4  Duration in visits:  8  Discussed with:  Patient      Functional Assessment Used  Quickdash General Total Score: 54.55     Referring provider co-signature:  I have reviewed this plan of care and my co-signature certifies the need for services.  Certification Dates:   From 12/16/19   To 01/14/20    Physician Signature: ________________________________ Date: ______________

## 2019-12-18 ENCOUNTER — PHYSICAL THERAPY (OUTPATIENT)
Dept: PHYSICAL THERAPY | Facility: REHABILITATION | Age: 59
End: 2019-12-18
Attending: PHYSICIAN ASSISTANT
Payer: COMMERCIAL

## 2019-12-18 DIAGNOSIS — M77.8 LEFT SHOULDER TENDINITIS: ICD-10-CM

## 2019-12-18 PROCEDURE — 97110 THERAPEUTIC EXERCISES: CPT

## 2019-12-18 PROCEDURE — 97140 MANUAL THERAPY 1/> REGIONS: CPT

## 2019-12-18 NOTE — OP THERAPY DAILY TREATMENT
Outpatient Physical Therapy  DAILY TREATMENT     [facility] [address] [facility], [address]  [place] [address]  Phone:  [phone]    Date: 12/18/2019    Patient: Emily Sanchez  YOB: 1960  MRN: [id number]     Time Calculation  Start time: 1430  Stop time: 1500 Time Calculation (min): 30 minutes       Chief Complaint: Shoulder Problem    Visit #: 2    SUBJECTIVE:  The patient presents with less stiffness and soreness today in the (L) shoulder. She is able to put her seatbelt on more easily but still has difficulty because of mobility.    OBJECTIVE:  Current objective measures:       AROM to the (L) shoulder in IR/ER     Therapeutic Exercises (CPT 76905):     1. Sleepers stretch, 4 x 30 sec , light to moderate stretch     2. Hand behind back , 3 x30 sec     3. Shoulder retraction     4. Pec stretch on roller    5. Snow angels on the wall    6. Corner stretch     7. Towel stretch, (L) IR/ER , more noticeable to the (L) shoulder     Therapeutic Treatments and Modalities:     1. Manual Therapy (CPT 17613), (L) shoulder PAMS in flexion, abduction, IR/ER; P/A inferior /lateral joint mobs grade 2-3     Time-based treatments/modalities:  Manual therapy minutes (CPT 06873): 15 minutes  Therapeutic exercise minutes (CPT 92599): 15 minutes         ASSESSMENT:   Response to treatment:   Performed passive accessory motions t the (L) shoulder; increased discomfort to the (L) shoulder during towel stretch to the anterior and lateral aspect of the (L) UE. Patient demonstrated improved (L) shoulder ER to C7 and (L) IR at T5. Less pain following treatment and more AROM     PLAN/RECOMMENDATIONS:   Plan for treatment: therapy treatment to continue next visit.  Planned interventions for next visit: continue with current treatment.

## 2019-12-31 ENCOUNTER — PHYSICAL THERAPY (OUTPATIENT)
Dept: PHYSICAL THERAPY | Facility: REHABILITATION | Age: 59
End: 2019-12-31
Attending: PHYSICIAN ASSISTANT
Payer: COMMERCIAL

## 2019-12-31 DIAGNOSIS — M77.8 LEFT SHOULDER TENDINITIS: ICD-10-CM

## 2019-12-31 PROCEDURE — 97110 THERAPEUTIC EXERCISES: CPT

## 2019-12-31 PROCEDURE — 97140 MANUAL THERAPY 1/> REGIONS: CPT

## 2019-12-31 NOTE — OP THERAPY DAILY TREATMENT
Outpatient Physical Therapy  DAILY TREATMENT     [facility] [address], [address]  [place] [address]  Phone:  [phone]    Date: 12/31/2019    Patient: Emily Sanchez  YOB: 1960  MRN: [id number]     Time Calculation               Chief Complaint: Shoulder Problem    Visit #: 3    SUBJECTIVE:  The patient reports having less pain lying on her (L) side at night. She has improved AROM to the (L) shoulder and will continue with her HEP for now at this time    OBJECTIVE:  Current objective measures:     Increased (L) shoulder IR       Therapeutic Exercises (CPT 87332):     1. Sleepers stretch, 4 x 30 sec , light to moderate stretch     2. Hand behind back , 3 x30 sec     3. Shoulder retraction     4. Pec stretch on roller    5. Snow angels on the wall, 1 x10 reps     6. Corner stretch     7. Towel stretch, (L) IR/ER , more noticeable to the (L) shoulder     Therapeutic Treatments and Modalities:     1. Manual Therapy (CPT 56389), (L) shoulder PAMS in flexion, abduction, IR/ER; P/A inferior /lateral joint mobs grade 2-3     Time-based treatments/modalities:            ASSESSMENT:   Response to treatment:    (L) shoulder IR at T7 behind the low back with no pain     PLAN/RECOMMENDATIONS:   Plan for treatment: therapy treatment to continue next visit.  Planned interventions for next visit: continue with current treatment.

## 2020-01-20 ENCOUNTER — OFFICE VISIT (OUTPATIENT)
Dept: URGENT CARE | Facility: CLINIC | Age: 60
End: 2020-01-20
Payer: COMMERCIAL

## 2020-01-20 VITALS
HEART RATE: 94 BPM | HEIGHT: 62 IN | DIASTOLIC BLOOD PRESSURE: 80 MMHG | WEIGHT: 147.2 LBS | SYSTOLIC BLOOD PRESSURE: 130 MMHG | TEMPERATURE: 97 F | BODY MASS INDEX: 27.09 KG/M2 | OXYGEN SATURATION: 97 % | RESPIRATION RATE: 16 BRPM

## 2020-01-20 DIAGNOSIS — R10.84 DIFFUSE ABDOMINAL PAIN: ICD-10-CM

## 2020-01-20 PROCEDURE — 99214 OFFICE O/P EST MOD 30 MIN: CPT | Performed by: PHYSICIAN ASSISTANT

## 2020-01-20 ASSESSMENT — ENCOUNTER SYMPTOMS
CHILLS: 0
ABDOMINAL PAIN: 1
HEADACHES: 0
FEVER: 0
COUGH: 0
BLURRED VISION: 0
SORE THROAT: 0
NAUSEA: 0
BLOOD IN STOOL: 0
CONSTIPATION: 0
NECK PAIN: 0
DIARRHEA: 1
EYE DISCHARGE: 0
VOMITING: 0
DIZZINESS: 0
BACK PAIN: 0
SHORTNESS OF BREATH: 0

## 2020-01-20 NOTE — LETTER
January 20, 2020         Patient: Emily Sanchez   YOB: 1960   Date of Visit: 1/20/2020           To Whom it May Concern:    Emily Sanchez was seen in my clinic on 1/20/2020. Please excuse her from work 1/21 - 1/22.     If you have any questions or concerns, please don't hesitate to call.        Sincerely,           Hortensia Gong P.A.-C.  Electronically Signed

## 2020-01-20 NOTE — PROGRESS NOTES
Subjective:      Emily Sanchez is a 60 y.o. female who presents with Diarrhea (x 3 days, diarrhea, no appetite and abdominal pain all over)            HPI  60-year-old female presents to urgent care with new problem of diarrhea, decreased appetite and diffuse abdominal pain onset 3 days ago.  Patient denies nausea or vomiting.  She denies urinary symptoms.  No blood in stools.  Patient reports she has been taking Pepto-Bismol reports history of black stools.  Patient has been tolerating p.o. fluids.  She denies exposure to spoiled foods.  She denies fevers.  Patient reports she recently traveled to [place] about 1 month ago. Denies other associated aggravating or alleviating factors.     Review of Systems   Constitutional: Negative for chills, fever and malaise/fatigue.   HENT: Negative for congestion and sore throat.    Eyes: Negative for blurred vision and discharge.   Respiratory: Negative for cough and shortness of breath.    Cardiovascular: Negative for chest pain.   Gastrointestinal: Positive for abdominal pain and diarrhea. Negative for blood in stool, constipation, melena, nausea and vomiting.   Genitourinary: Negative for dysuria.   Musculoskeletal: Negative for back pain and neck pain.   Skin: Negative for rash.   Neurological: Negative for dizziness and headaches.   Endo/Heme/Allergies: Negative for environmental allergies.       Past Medical History:   Diagnosis Date   • Bronchiectasis (HCC)    • Bronchitis    • Environmental allergies 7/3/2013   • Hepatitis B carrier (HCC)    • History of pneumothorax    • Hypertension    • Pneumonia    • Positive TB test     treated 1990's   • Tuberculosis    • Tuberculosis exposure      Current Outpatient Medications on File Prior to Visit   Medication Sig Dispense Refill   • losartan (COZAAR) 50 MG Tab TAKE 1/2 TABLET BY MOUTH IN THE MORNING THEN TAKE 1 TABLET IN THE EVENING 135 Tab 1   • SYMBICORT 80-4.5 MCG/ACT Aerosol INHALE 1 TO 2 PUFFS BY MOUTH 2 TIMES  "A DAY. RINSE MOUTH WITH WATER AFTER EACH USE  4   • Omega-3 Fatty Acids (FISH OIL) 1000 MG Cap capsule Take 1,000 mg by mouth every day.     • Cholecalciferol (VITAMIN D) 2000 units Cap Take 2,000 Units by mouth every day.     • acetaminophen (TYLENOL) 500 MG Tab Take 1,000 mg by mouth every 8 hours as needed.     • fluticasone (FLONASE) 50 MCG/ACT nasal spray Spray 1 Spray in nose 2 times a day. 1 Bottle 3   • Multiple Vitamin (MULTIVITAMIN) capsule Take 1 Cap by mouth every day. 100 Cap 0   • aspirin (ASA) 81 MG CHEW chewable tablet Take 1 Tab by mouth every day. 100 Tab 11   • loratadine (CLARITIN) 10 MG TABS Take 10 mg by mouth every day.     • montelukast (SINGULAIR) 10 MG Tab TAKE 1 TABLET BY MOUTH EVERYDAY AT BEDTIME  4   • benzonatate (TESSALON) 100 MG Cap Take 1 Cap by mouth 3 times a day as needed for Cough. (Patient not taking: Reported on 11/19/2019) 60 Cap 0   • albuterol 108 (90 Base) MCG/ACT Aero Soln inhalation aerosol Inhale 2 Puffs by mouth every 6 hours as needed for Shortness of Breath. 1 Inhaler 2   • budesonide-formoterol (SYMBICORT) 160-4.5 MCG/ACT Aerosol Inhale 2 Puffs by mouth 2 Times a Day. Rinse mouth after each use 1 Inhaler 5     No current facility-administered medications on file prior to visit.      Allergies   Allergen Reactions   • Amoxicillin-Pot Clavulanate Hives   • Seasonal    • Clavulanic Acid Hives and Nausea     Social History     Tobacco Use   • Smoking status: Never Smoker   • Smokeless tobacco: Never Used   Substance Use Topics   • Alcohol use: Yes     Alcohol/week: 0.0 oz     Comment: once a month      Objective:     /80 (BP Location: Left arm, Patient Position: Sitting, BP Cuff Size: Adult)   Pulse 94   Temp 36.1 °C (97 °F) (Temporal)   Resp 16   Ht 1.575 m (5' 2\")   Wt 66.8 kg (147 lb 3.2 oz)   SpO2 97%   BMI 26.92 kg/m²      Physical Exam  Vitals signs reviewed.   Constitutional:       General: She is not in acute distress.     Appearance: Normal " appearance. She is well-developed. She is not ill-appearing.   HENT:      Head: Normocephalic and atraumatic.      Mouth/Throat:      Mouth: Mucous membranes are moist.      Pharynx: Oropharynx is clear.   Eyes:      Extraocular Movements: Extraocular movements intact.      Conjunctiva/sclera: Conjunctivae normal.   Neck:      Musculoskeletal: Normal range of motion and neck supple.   Cardiovascular:      Rate and Rhythm: Normal rate and regular rhythm.      Heart sounds: Normal heart sounds.   Pulmonary:      Effort: Pulmonary effort is normal. No respiratory distress.      Breath sounds: Normal breath sounds.   Abdominal:      General: Bowel sounds are increased.      Palpations: Abdomen is soft.      Tenderness: There is generalized tenderness. There is no right CVA tenderness, left CVA tenderness, guarding or rebound.      Comments: Mild diffuse abdominal tenderness with no focal tenderness, rebound, or guarding.   Musculoskeletal: Normal range of motion.   Skin:     General: Skin is warm and dry.      Findings: No erythema.   Neurological:      General: No focal deficit present.      Mental Status: She is alert and oriented to person, place, and time.   Psychiatric:         Mood and Affect: Mood normal.         Behavior: Behavior normal.         Thought Content: Thought content normal.         Judgment: Judgment normal.                 Assessment/Plan:     1. Diffuse abdominal pain  CBC WITH DIFFERENTIAL    LIPASE    Comp Metabolic Panel     Advised patient symptoms are most likely viral in etiology.  Will order CBC, lipase and CMP to rule out bacterial infection or intraperitoneal etiologies of patient's symptoms.  I will follow-up pending lab results.  No imaging indicated at this time.  Recommend patient increase fluid and rest.  Electrolyte replacement with Pedialyte.  PT should follow up with PCP in 1-2 days for re-evaluation if symptoms have not improved.  Discussed red flags and reasons to return to   or ED.  Pt and/or family verbalized understanding of diagnosis and follow up instructions and was offered informational handout on diagnosis.  PT discharged.

## 2020-01-21 ENCOUNTER — HOSPITAL ENCOUNTER (OUTPATIENT)
Dept: LAB | Facility: MEDICAL CENTER | Age: 60
End: 2020-01-21
Attending: PHYSICIAN ASSISTANT
Payer: COMMERCIAL

## 2020-01-21 DIAGNOSIS — R10.84 DIFFUSE ABDOMINAL PAIN: ICD-10-CM

## 2020-01-21 LAB
ALBUMIN SERPL BCP-MCNC: 4.2 G/DL (ref 3.2–4.9)
ALBUMIN/GLOB SERPL: 1.6 G/DL
ALP SERPL-CCNC: 47 U/L (ref 30–99)
ALT SERPL-CCNC: 20 U/L (ref 2–50)
ANION GAP SERPL CALC-SCNC: 11 MMOL/L (ref 0–11.9)
AST SERPL-CCNC: 21 U/L (ref 12–45)
BASOPHILS # BLD AUTO: 0.4 % (ref 0–1.8)
BASOPHILS # BLD: 0.02 K/UL (ref 0–0.12)
BILIRUB SERPL-MCNC: 0.5 MG/DL (ref 0.1–1.5)
BUN SERPL-MCNC: 11 MG/DL (ref 8–22)
CALCIUM SERPL-MCNC: 9.2 MG/DL (ref 8.5–10.5)
CHLORIDE SERPL-SCNC: 103 MMOL/L (ref 96–112)
CO2 SERPL-SCNC: 26 MMOL/L (ref 20–33)
CREAT SERPL-MCNC: 0.87 MG/DL (ref 0.5–1.4)
EOSINOPHIL # BLD AUTO: 0.18 K/UL (ref 0–0.51)
EOSINOPHIL NFR BLD: 3.8 % (ref 0–6.9)
ERYTHROCYTE [DISTWIDTH] IN BLOOD BY AUTOMATED COUNT: 41.2 FL (ref 35.9–50)
FASTING STATUS PATIENT QL REPORTED: NORMAL
GLOBULIN SER CALC-MCNC: 2.7 G/DL (ref 1.9–3.5)
GLUCOSE SERPL-MCNC: 85 MG/DL (ref 65–99)
HCT VFR BLD AUTO: 43.4 % (ref 37–47)
HGB BLD-MCNC: 14.6 G/DL (ref 12–16)
IMM GRANULOCYTES # BLD AUTO: 0.01 K/UL (ref 0–0.11)
IMM GRANULOCYTES NFR BLD AUTO: 0.2 % (ref 0–0.9)
LIPASE SERPL-CCNC: 24 U/L (ref 11–82)
LYMPHOCYTES # BLD AUTO: 1.85 K/UL (ref 1–4.8)
LYMPHOCYTES NFR BLD: 39.2 % (ref 22–41)
MCH RBC QN AUTO: 31.1 PG (ref 27–33)
MCHC RBC AUTO-ENTMCNC: 33.6 G/DL (ref 33.6–35)
MCV RBC AUTO: 92.5 FL (ref 81.4–97.8)
MONOCYTES # BLD AUTO: 0.49 K/UL (ref 0–0.85)
MONOCYTES NFR BLD AUTO: 10.4 % (ref 0–13.4)
NEUTROPHILS # BLD AUTO: 2.17 K/UL (ref 2–7.15)
NEUTROPHILS NFR BLD: 46 % (ref 44–72)
NRBC # BLD AUTO: 0 K/UL
NRBC BLD-RTO: 0 /100 WBC
PLATELET # BLD AUTO: 307 K/UL (ref 164–446)
PMV BLD AUTO: 9.5 FL (ref 9–12.9)
POTASSIUM SERPL-SCNC: 3.9 MMOL/L (ref 3.6–5.5)
PROT SERPL-MCNC: 6.9 G/DL (ref 6–8.2)
RBC # BLD AUTO: 4.69 M/UL (ref 4.2–5.4)
SODIUM SERPL-SCNC: 140 MMOL/L (ref 135–145)
WBC # BLD AUTO: 4.7 K/UL (ref 4.8–10.8)

## 2020-01-21 PROCEDURE — 83690 ASSAY OF LIPASE: CPT

## 2020-01-21 PROCEDURE — 85025 COMPLETE CBC W/AUTO DIFF WBC: CPT

## 2020-01-21 PROCEDURE — 36415 COLL VENOUS BLD VENIPUNCTURE: CPT

## 2020-01-21 PROCEDURE — 80053 COMPREHEN METABOLIC PANEL: CPT

## 2020-03-03 ENCOUNTER — OFFICE VISIT (OUTPATIENT)
Dept: URGENT CARE | Facility: CLINIC | Age: 60
End: 2020-03-03
Payer: COMMERCIAL

## 2020-03-03 VITALS
HEIGHT: 62 IN | BODY MASS INDEX: 27.23 KG/M2 | SYSTOLIC BLOOD PRESSURE: 148 MMHG | HEART RATE: 110 BPM | WEIGHT: 148 LBS | RESPIRATION RATE: 20 BRPM | TEMPERATURE: 98 F | DIASTOLIC BLOOD PRESSURE: 96 MMHG | OXYGEN SATURATION: 95 %

## 2020-03-03 DIAGNOSIS — J44.1 COPD EXACERBATION (HCC): ICD-10-CM

## 2020-03-03 PROCEDURE — 99214 OFFICE O/P EST MOD 30 MIN: CPT | Performed by: FAMILY MEDICINE

## 2020-03-03 RX ORDER — AZITHROMYCIN 250 MG/1
TABLET, FILM COATED ORAL
Qty: 6 TAB | Refills: 0 | Status: SHIPPED | OUTPATIENT
Start: 2020-03-03 | End: 2021-04-09

## 2020-03-03 RX ORDER — PREDNISONE 10 MG/1
40 TABLET ORAL DAILY
Qty: 16 TAB | Refills: 0 | Status: SHIPPED | OUTPATIENT
Start: 2020-03-03 | End: 2020-03-07

## 2020-03-03 ASSESSMENT — FIBROSIS 4 INDEX: FIB4 SCORE: 0.92

## 2020-03-03 NOTE — PROGRESS NOTES
Chief Complaint   Patient presents with   • Cough     x6days,cough, chest congestion, SOB, prone to pneumonia, rattling in chest         Cough  This is a new problem. The current episode started 1 week ago. The problem has been gradually worsening. The problem occurs constantly. The cough is productive of sputum. Associated symptoms include: low grade fever, wheezing. Pertinent negatives include no   headaches, sweats, weight loss. Nothing aggravates the symptoms.  Patient has tried albuterol, atrovent for the symptoms - minor improvement.   Has hx of COPD         Social History     Tobacco Use   • Smoking status: Never Smoker   • Smokeless tobacco: Never Used   Substance Use Topics   • Alcohol use: Yes     Alcohol/week: 0.0 oz     Comment: once a month   • Drug use: No           Past Medical History:   Diagnosis Date   • Bronchiectasis (HCC)    • Bronchitis    • Environmental allergies 7/3/2013   • Hepatitis B carrier (HCC)    • History of pneumothorax    • Hypertension    • Pneumonia    • Positive TB test     treated 1990's   • Tuberculosis    • Tuberculosis exposure          Family History   Problem Relation Age of Onset   • Heart Disease Mother    • Diabetes Mother    • Heart Disease Father    • Hypertension Sister    • Other Sister         1 sister - kidney transplant   • Diabetes Sister    • Diabetes Brother         1 brother   • Drug abuse Brother    • Kidney Disease Brother            Review of Systems   Constitutional: Negative for fever and weight loss.   HENT: negative for ear pain.    Cardiovascular - denies chest pain or dyspnea  Respiratory: Positive for cough, wheezing.  Cough is productive.     Neurological: Negative for headaches.   GI - denies nausea, vomiting or diarrhea  Neuro - denies numbness or tingling.            Objective:     /96 (BP Location: Left arm, Patient Position: Sitting, BP Cuff Size: Adult)   Pulse (!) 110   Temp 36.7 °C (98 °F) (Temporal)   Resp 20   Ht 1.575 m  "(5' 2\")   Wt 67.1 kg (148 lb)   SpO2 95%       Physical Exam   Constitutional: patient is oriented to person, place, and time. Patient appears well-developed and well-nourished. No distress.   HENT:   Head: Normocephalic and atraumatic.   Right Ear: External ear normal.   Left Ear: External ear normal.   Nose: Mucosal edema  present. Right sinus exhibits no maxillary sinus tenderness. Left sinus exhibits no maxillary sinus tenderness.   Mouth/Throat: Mucous membranes are normal. No oral lesions. Posterior oropharyngeal erythema present. No oropharyngeal exudate or posterior oropharyngeal edema.   Eyes: Conjunctivae and EOM are normal. Pupils are equal, round, and reactive to light. Right eye exhibits no discharge. Left eye exhibits no discharge. No scleral icterus.   Neck: Normal range of motion. Neck supple. No tracheal deviation present.   Cardiovascular: Normal rate, regular rhythm and normal heart sounds.  Exam reveals no friction rub.    Pulmonary/Chest: Effort normal. No respiratory distress.  Patient has rhonchi and wheezing. Patient has no rales.    Musculoskeletal:  exhibits no edema.   Lymphadenopathy:    no cervical LAD  Neurological: patient is alert and oriented to person, place, and time.   Skin: Skin is warm and dry. No rash noted. No erythema.   Psychiatric: patient  has a normal mood and affect.  behavior is normal.   Nursing note and vitals reviewed.              Assessment/Plan:       1. COPD exacerbation (HCC)     - azithromycin (ZITHROMAX) 250 MG Tab; Take as directed  Dispense: 6 Tab; Refill: 0  - predniSONE (DELTASONE) 10 MG Tab; Take 4 Tabs by mouth every day for 4 days.  Dispense: 16 Tab; Refill: 0             Supportive care, differential diagnoses, and indications for immediate follow-up discussed with patient.   Pathogenesis of diagnosis discussed including typical length and natural progression.   Instructed to return to clinic or nearest emergency department for any change in " condition, further concerns, or worsening of symptoms.  Patient states understanding of the plan of care and discharge instructions.

## 2020-03-03 NOTE — LETTER
March 3, 2020         Patient: Emily Sanchez   YOB: 1960   Date of Visit: 3/3/2020           To Whom it May Concern:    Emily Sanchez was seen in my clinic on 3/3/2020. She may return to work on Monday.    If you have any questions or concerns, please don't hesitate to call.        Sincerely,           Scot Tran M.D.  Electronically Signed

## 2020-03-25 NOTE — OP THERAPY DISCHARGE SUMMARY
Outpatient Physical Therapy  DISCHARGE SUMMARY NOTE      [facility] [place] [address] [address], [address]  [place] [address]  Phone:  [phone]    Date of Visit: 12/16/2019    Patient: Emily Sanchez  YOB: 1960  MRN: [id number]     Referring Provider: Eduar Calvillo P.A.-C.  [address]  [address]  [place] [address]   Referring Diagnosis Left shoulder tendinitis [M75.82]     Physical Therapy Occurrence Codes    Date of onset of impairment:  11/19/19   Date physical therapy care plan established or reviewed:  12/16/19   Date physical therapy treatment started:  12/16/19          Functional Assessment Used        Your patient is being discharged from Physical Therapy with the following comments:   · Goals met  · Patient has failed to schedule or reschedule follow-up visits    Comments:  The patient failed to schedule further appointments but met most of her goals with (L) shoulder      Limitations Remaining:  The patient has less pain lying on her (L) side at night. She has improved AROM to the (L) shoulder     Recommendations:  Continue with HEP and follow up with PCP as needed    Eric Swann, PT    Date: 3/25/2020

## 2020-03-26 ENCOUNTER — HOSPITAL ENCOUNTER (OUTPATIENT)
Dept: LAB | Facility: MEDICAL CENTER | Age: 60
End: 2020-03-26
Attending: FAMILY MEDICINE
Payer: COMMERCIAL

## 2020-03-26 DIAGNOSIS — E78.5 DYSLIPIDEMIA: ICD-10-CM

## 2020-03-26 DIAGNOSIS — I10 ESSENTIAL HYPERTENSION: ICD-10-CM

## 2020-03-26 LAB
ALBUMIN SERPL BCP-MCNC: 4.4 G/DL (ref 3.2–4.9)
ALBUMIN/GLOB SERPL: 1.5 G/DL
ALP SERPL-CCNC: 60 U/L (ref 30–99)
ALT SERPL-CCNC: 26 U/L (ref 2–50)
ANION GAP SERPL CALC-SCNC: 13 MMOL/L (ref 7–16)
AST SERPL-CCNC: 22 U/L (ref 12–45)
BILIRUB SERPL-MCNC: 0.5 MG/DL (ref 0.1–1.5)
BUN SERPL-MCNC: 14 MG/DL (ref 8–22)
CALCIUM SERPL-MCNC: 9.2 MG/DL (ref 8.5–10.5)
CHLORIDE SERPL-SCNC: 102 MMOL/L (ref 96–112)
CHOLEST SERPL-MCNC: 205 MG/DL (ref 100–199)
CO2 SERPL-SCNC: 26 MMOL/L (ref 20–33)
CREAT SERPL-MCNC: 0.7 MG/DL (ref 0.5–1.4)
FASTING STATUS PATIENT QL REPORTED: NORMAL
GLOBULIN SER CALC-MCNC: 3 G/DL (ref 1.9–3.5)
GLUCOSE SERPL-MCNC: 85 MG/DL (ref 65–99)
HDLC SERPL-MCNC: 77 MG/DL
LDLC SERPL CALC-MCNC: 100 MG/DL
POTASSIUM SERPL-SCNC: 4 MMOL/L (ref 3.6–5.5)
PROT SERPL-MCNC: 7.4 G/DL (ref 6–8.2)
SODIUM SERPL-SCNC: 141 MMOL/L (ref 135–145)
TRIGL SERPL-MCNC: 138 MG/DL (ref 0–149)

## 2020-03-26 PROCEDURE — 80053 COMPREHEN METABOLIC PANEL: CPT

## 2020-03-26 PROCEDURE — 80061 LIPID PANEL: CPT

## 2020-03-26 PROCEDURE — 36415 COLL VENOUS BLD VENIPUNCTURE: CPT

## 2020-04-02 ENCOUNTER — OFFICE VISIT (OUTPATIENT)
Dept: MEDICAL GROUP | Facility: PHYSICIAN GROUP | Age: 60
End: 2020-04-02
Payer: COMMERCIAL

## 2020-04-02 DIAGNOSIS — E78.5 DYSLIPIDEMIA: ICD-10-CM

## 2020-04-02 DIAGNOSIS — I10 ESSENTIAL HYPERTENSION: ICD-10-CM

## 2020-04-02 DIAGNOSIS — J45.20 MILD INTERMITTENT ASTHMA WITHOUT COMPLICATION: ICD-10-CM

## 2020-04-02 DIAGNOSIS — Z91.09 ENVIRONMENTAL ALLERGIES: ICD-10-CM

## 2020-04-02 PROCEDURE — 99441 PR PHYSICIAN TELEPHONE EVALUATION 5-10 MIN: CPT | Mod: CR | Performed by: FAMILY MEDICINE

## 2020-04-02 NOTE — PROGRESS NOTES
Telephone Appointment Visit   As a means of avoiding spread of COVID-19, this visit is being conducted by telephone. This telephone visit was initiated by the patient and they verbally consented.    Time at start of call: 4:08 PM    Reason for Call:  Lab Follow-up and follow-up of medical problems    Patient Comments / History:     In terms of hypertension, patient continues to take losartan 50 mg 1 tablet daily without any side effects.  She has been checking her blood pressure at home and they have been running from 120- 127/75-80.    For her dyslipidemia, she continues to manage this with her own efforts.  Her 10-year ASCVD risk for her last lab work in August 2019 was 2%.    For her mild intermittent asthma, she continues to use the Symbicort inhaler very regularly.  She says she has been staying indoors due to the coronavirus pandemic.  At this time of the year which is the springtime her asthma usually gets worse but so far it has been under control.  She has not used her albuterol inhaler in a while.    For her environmental allergies, the last allergy shots/immunotherapy shot was a month ago.  She has told her allergist that she will not be coming back monthly for the shot because of the coronavirus pandemic.  She will wait until the crisis is over.  She is worried about coming down with the virus due to her lung disease.  She continues to take Singulair and Zyrtec.    She has been off work for about a month now due to the pandemic.    Past medical history, past surgical history, family history reviewed-no changes    Social history reviewed-no changes    Allergies reviewed-no changes    Medications reviewed-no changes        Labs / Images Reviewed       Hospital Outpatient Visit on 03/26/2020   Component Date Value Ref Range Status   • Sodium 03/26/2020 141  135 - 145 mmol/L Final   • Potassium 03/26/2020 4.0  3.6 - 5.5 mmol/L Final   • Chloride 03/26/2020 102  96 - 112 mmol/L Final   • Co2 03/26/2020 26   20 - 33 mmol/L Final   • Anion Gap 03/26/2020 13.0  7.0 - 16.0 Final   • Glucose 03/26/2020 85  65 - 99 mg/dL Final   • Bun 03/26/2020 14  8 - 22 mg/dL Final   • Creatinine 03/26/2020 0.70  0.50 - 1.40 mg/dL Final   • Calcium 03/26/2020 9.2  8.5 - 10.5 mg/dL Final   • AST(SGOT) 03/26/2020 22  12 - 45 U/L Final   • ALT(SGPT) 03/26/2020 26  2 - 50 U/L Final   • Alkaline Phosphatase 03/26/2020 60  30 - 99 U/L Final   • Total Bilirubin 03/26/2020 0.5  0.1 - 1.5 mg/dL Final   • Albumin 03/26/2020 4.4  3.2 - 4.9 g/dL Final   • Total Protein 03/26/2020 7.4  6.0 - 8.2 g/dL Final   • Globulin 03/26/2020 3.0  1.9 - 3.5 g/dL Final   • A-G Ratio 03/26/2020 1.5  g/dL Final   • Cholesterol,Tot 03/26/2020 205* 100 - 199 mg/dL Final   • Triglycerides 03/26/2020 138  0 - 149 mg/dL Final   • HDL 03/26/2020 77  >=40 mg/dL Final   • LDL 03/26/2020 100* <100 mg/dL Final   • Fasting Status 03/26/2020 Fasting   Final   • GFR If  03/26/2020 >60  >60 mL/min/1.73 m 2 Final   • GFR If Non  03/26/2020 >60  >60 mL/min/1.73 m 2 Final       Assessment and Plan:     1. Essential hypertension  - Lipid Profile; Future  - Comp Metabolic Panel; Future    2. Dyslipidemia  - Lipid Profile; Future  - Comp Metabolic Panel; Future    3. Mild intermittent asthma without complication  - Lipid Profile; Future  - Comp Metabolic Panel; Future    4. Environmental allergies  - Lipid Profile; Future  - Comp Metabolic Panel; Future    1.  Home blood pressure readings are running under control.  Continue medication.  Continue avoidance of salty foods, regular exercises and keeping healthy weight.    2.  LDL cholesterol has improved from 102-100.  The goal is still below 100.  Her HDL runs high and is higher than before.  She will continue to manage this with her own efforts.    3.  Currently under control on Symbicort.  She has not had to use her rescue inhaler.    4.  She has to put the immunotherapy/monthly allergy shots on hold due  to the coronavirus pandemic.  She has been taking montelukast and Zyrtec and staying indoors most of the time and so far her allergies have been under control.    Follow-up: Follow-up in 6 months or sooner if needed with blood work prior to that visit.    Time at end of call: 4:18 PM    Total Time Spent: 5-10 minutes    Noni Jansen M.D.

## 2020-08-05 ENCOUNTER — OFFICE VISIT (OUTPATIENT)
Dept: MEDICAL GROUP | Facility: PHYSICIAN GROUP | Age: 60
End: 2020-08-05
Payer: COMMERCIAL

## 2020-08-05 VITALS
TEMPERATURE: 97 F | SYSTOLIC BLOOD PRESSURE: 120 MMHG | HEIGHT: 62 IN | BODY MASS INDEX: 27.18 KG/M2 | OXYGEN SATURATION: 95 % | HEART RATE: 92 BPM | DIASTOLIC BLOOD PRESSURE: 70 MMHG | WEIGHT: 147.71 LBS

## 2020-08-05 DIAGNOSIS — J45.20 MILD INTERMITTENT ASTHMA WITHOUT COMPLICATION: ICD-10-CM

## 2020-08-05 DIAGNOSIS — I10 ESSENTIAL HYPERTENSION: ICD-10-CM

## 2020-08-05 DIAGNOSIS — E78.5 DYSLIPIDEMIA: ICD-10-CM

## 2020-08-05 DIAGNOSIS — Z86.11 HISTORY OF TB (TUBERCULOSIS): ICD-10-CM

## 2020-08-05 DIAGNOSIS — J47.9 BRONCHIECTASIS WITHOUT COMPLICATION (HCC): ICD-10-CM

## 2020-08-05 PROCEDURE — 99214 OFFICE O/P EST MOD 30 MIN: CPT | Performed by: FAMILY MEDICINE

## 2020-08-05 ASSESSMENT — FIBROSIS 4 INDEX: FIB4 SCORE: 0.84

## 2020-08-05 ASSESSMENT — PATIENT HEALTH QUESTIONNAIRE - PHQ9: CLINICAL INTERPRETATION OF PHQ2 SCORE: 0

## 2020-08-05 NOTE — PROGRESS NOTES
Subjective:      Emily Sanchez is a 60 y.o. female who presents with Letter for School/Work            HPI     Patient is here because she needs form filled out for extended leave of absence from work.  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  Because of the COVID-19 pandemic school closed since spring break in April 2020 and students had to do distance learning at home.  School will reopen August 17.  Patient stated that they will go back to work on August 10.  Because of her medical problems including mild intermittent asthma, bronchiectasis and history of pulmonary tuberculosis, she is not comfortable going to work because of increased risk of getting the COVID-19 infection.  She also cannot keep her mask on while at work because it causes her to have shortness of breath.  She said she stays home most of the time and is not even go out to do grocery shopping.  She says she has a hard time keeping her mask on even for a few minutes only.  Her work allows her to have the leave of absence as long as her PCP will fill out the paperwork.  She plans to stay off work until the end of December.  She is hoping that by then the COVID-19 pandemic will get better or there will be a vaccine already available.    She continues to use Symbicort inhaler for her asthma with good control of her problem.  She rarely has to use her albuterol rescue inhaler.  She continues to follow-up with her pulmonary specialist and she has an appointment coming up in November 2020.      For history of bronchiectasis, this was documented on imaging studies specifically a CT scan of the chest involving the posterior medial and posterior lateral aspects of the right lower lobe with some scarring at both lung bases.  She said she currently does not have any cough or any phlegm production.    She had pulmonary tuberculosis years ago treated in [place] with medications for a period of a year.    She  "continues to take her blood pressure medication losartan for hypertension with good control of her blood pressure.    She is managing her dyslipidemia with her own efforts only.  The last lipid panel was in March 2020 that came back total cholesterol 205, triglycerides 138, HDL 77 and .    Past medical history, past surgical history, family history reviewed-no changes    Social history reviewed-no changes    Allergies reviewed-no changes    Medications reviewed-no changes    ROS     As per HPI, the rest are negative.       Objective:     /70 (BP Location: Left arm, Patient Position: Sitting, BP Cuff Size: Adult)   Pulse 92   Temp 36.1 °C (97 °F) (Temporal)   Ht 1.575 m (5' 2\")   Wt 67 kg (147 lb 11.3 oz)   SpO2 95%   BMI 27.02 kg/m²      Physical Exam     Examined alert, awake, oriented, not in distress    Neck-supple, no lymphadenopathy, no thyromegaly  Lungs-clear to auscultation, no rales, no wheezes  Heart-regular rate and rhythm, no murmur  Extremities-no edema, clubbing, cyanosis            Assessment/Plan:        1. Mild intermittent asthma without complication  This is currently controlled on her regimen of Symbicort.  She rarely uses the albuterol HFA.  We will fill out the paperwork for her extended leave of absence which will be from 8/10/2020 to 1/3/2021.    2. Bronchiectasis without complication (HCC)  Currently stable.  She does not have any symptoms at this time.  Keep follow-up appointment with pulmonary specialist.    3. History of TB (tuberculosis)  Treated years ago.  No evidence of active disease.    4.  Essential hypertension  Under control on losartan.    5.  Dyslipidemia  Managed with her own efforts.  She will do updated blood work for her follow-up visit in 3 months.      Please note that this dictation was created using voice recognition software. I have worked with consultants from the vendor as well as technical experts from [employer] to optimize the interface. I " have made every reasonable attempt to correct obvious errors, but I expect that there are errors of grammar and possibly content I did not discover before finalizing the note.

## 2020-10-27 ENCOUNTER — HOSPITAL ENCOUNTER (OUTPATIENT)
Dept: LAB | Facility: MEDICAL CENTER | Age: 60
End: 2020-10-27
Attending: FAMILY MEDICINE
Payer: COMMERCIAL

## 2020-10-27 DIAGNOSIS — J45.20 MILD INTERMITTENT ASTHMA WITHOUT COMPLICATION: ICD-10-CM

## 2020-10-27 DIAGNOSIS — Z91.09 ENVIRONMENTAL ALLERGIES: ICD-10-CM

## 2020-10-27 DIAGNOSIS — I10 ESSENTIAL HYPERTENSION: ICD-10-CM

## 2020-10-27 DIAGNOSIS — E78.5 DYSLIPIDEMIA: ICD-10-CM

## 2020-10-27 LAB
ALBUMIN SERPL BCP-MCNC: 4.7 G/DL (ref 3.2–4.9)
ALBUMIN/GLOB SERPL: 1.6 G/DL
ALP SERPL-CCNC: 71 U/L (ref 30–99)
ALT SERPL-CCNC: 23 U/L (ref 2–50)
ANION GAP SERPL CALC-SCNC: 12 MMOL/L (ref 7–16)
AST SERPL-CCNC: 22 U/L (ref 12–45)
BILIRUB SERPL-MCNC: 0.5 MG/DL (ref 0.1–1.5)
BUN SERPL-MCNC: 15 MG/DL (ref 8–22)
CALCIUM SERPL-MCNC: 9.8 MG/DL (ref 8.5–10.5)
CHLORIDE SERPL-SCNC: 103 MMOL/L (ref 96–112)
CHOLEST SERPL-MCNC: 212 MG/DL (ref 100–199)
CO2 SERPL-SCNC: 26 MMOL/L (ref 20–33)
CREAT SERPL-MCNC: 0.76 MG/DL (ref 0.5–1.4)
FASTING STATUS PATIENT QL REPORTED: NORMAL
GLOBULIN SER CALC-MCNC: 3 G/DL (ref 1.9–3.5)
GLUCOSE SERPL-MCNC: 90 MG/DL (ref 65–99)
HDLC SERPL-MCNC: 74 MG/DL
LDLC SERPL CALC-MCNC: 109 MG/DL
POTASSIUM SERPL-SCNC: 4.4 MMOL/L (ref 3.6–5.5)
PROT SERPL-MCNC: 7.7 G/DL (ref 6–8.2)
SODIUM SERPL-SCNC: 141 MMOL/L (ref 135–145)
TRIGL SERPL-MCNC: 147 MG/DL (ref 0–149)

## 2020-10-27 PROCEDURE — 80053 COMPREHEN METABOLIC PANEL: CPT

## 2020-10-27 PROCEDURE — 80061 LIPID PANEL: CPT

## 2020-10-27 PROCEDURE — 36415 COLL VENOUS BLD VENIPUNCTURE: CPT

## 2020-11-02 ENCOUNTER — OFFICE VISIT (OUTPATIENT)
Dept: SLEEP MEDICINE | Facility: MEDICAL CENTER | Age: 60
End: 2020-11-02
Payer: COMMERCIAL

## 2020-11-02 VITALS
WEIGHT: 149 LBS | TEMPERATURE: 97.9 F | SYSTOLIC BLOOD PRESSURE: 110 MMHG | RESPIRATION RATE: 16 BRPM | OXYGEN SATURATION: 97 % | BODY MASS INDEX: 27.42 KG/M2 | DIASTOLIC BLOOD PRESSURE: 70 MMHG | HEART RATE: 72 BPM | HEIGHT: 62 IN

## 2020-11-02 DIAGNOSIS — J47.9 BRONCHIECTASIS WITHOUT COMPLICATION (HCC): ICD-10-CM

## 2020-11-02 DIAGNOSIS — Z86.11 HISTORY OF TB (TUBERCULOSIS): ICD-10-CM

## 2020-11-02 DIAGNOSIS — J45.30 MILD PERSISTENT ASTHMA WITHOUT COMPLICATION: ICD-10-CM

## 2020-11-02 DIAGNOSIS — Z91.09 ENVIRONMENTAL ALLERGIES: ICD-10-CM

## 2020-11-02 PROCEDURE — 99214 OFFICE O/P EST MOD 30 MIN: CPT | Performed by: INTERNAL MEDICINE

## 2020-11-02 ASSESSMENT — PAIN SCALES - GENERAL: PAINLEVEL: NO PAIN

## 2020-11-02 ASSESSMENT — FIBROSIS 4 INDEX: FIB4 SCORE: 0.9

## 2020-11-03 ENCOUNTER — OFFICE VISIT (OUTPATIENT)
Dept: MEDICAL GROUP | Facility: PHYSICIAN GROUP | Age: 60
End: 2020-11-03
Payer: COMMERCIAL

## 2020-11-03 VITALS
HEIGHT: 62 IN | SYSTOLIC BLOOD PRESSURE: 122 MMHG | BODY MASS INDEX: 27.42 KG/M2 | WEIGHT: 149 LBS | HEART RATE: 90 BPM | OXYGEN SATURATION: 97 % | DIASTOLIC BLOOD PRESSURE: 70 MMHG | RESPIRATION RATE: 14 BRPM | TEMPERATURE: 97.7 F

## 2020-11-03 DIAGNOSIS — J47.9 BRONCHIECTASIS WITHOUT COMPLICATION (HCC): ICD-10-CM

## 2020-11-03 DIAGNOSIS — I10 ESSENTIAL HYPERTENSION: ICD-10-CM

## 2020-11-03 DIAGNOSIS — J45.20 MILD INTERMITTENT ASTHMA WITHOUT COMPLICATION: ICD-10-CM

## 2020-11-03 DIAGNOSIS — Z12.31 ENCOUNTER FOR SCREENING MAMMOGRAM FOR MALIGNANT NEOPLASM OF BREAST: ICD-10-CM

## 2020-11-03 DIAGNOSIS — Z91.09 ENVIRONMENTAL ALLERGIES: ICD-10-CM

## 2020-11-03 DIAGNOSIS — E78.5 DYSLIPIDEMIA: ICD-10-CM

## 2020-11-03 DIAGNOSIS — L29.9 ITCHING: ICD-10-CM

## 2020-11-03 PROCEDURE — 99214 OFFICE O/P EST MOD 30 MIN: CPT | Performed by: FAMILY MEDICINE

## 2020-11-03 ASSESSMENT — FIBROSIS 4 INDEX: FIB4 SCORE: 0.9

## 2020-11-03 NOTE — PROGRESS NOTES
Subjective:      Emily Sanchez is a 60 y.o. female who presents with Blood Pressure Problem            HPI     Patient returns for follow-up of her medical problems.    In terms of her hypertension, this is under control on losartan without side effects.    We follow her for dyslipidemia and she is managing this with her own efforts only.    For her mild intermittent asthma, she continues use her Symbicort inhaler with good control of her asthma.  She also has albuterol rescue inhaler that she uses as needed only.  She follows up with pulmonary medicine.  She has not had any exacerbations even with the change in weather.    She also has bronchiectasis documented on CT of the chest in the posterior medial, posterior lateral right lower lobe.  She is currently without any cough or sputum production.  She also has history of pulmonary tuberculosis treated when she was still living in [place].  She is also followed by the pulmonary medicine for these problems.    In terms of her environmental allergies, she was getting immunotherapy through her allergist but she stopped in March 2020 when the COVID-19 pandemic started.  She continues to take Zyrtec and Singulair and so far her problem has been under control.    She complains of itching of the soles of the feet at night which happens a few times a week.  Denies any pain, numbness, tingling, burning sensation.    She is due for screening mammogram and GYN exam/Pap smear.    We have given her extended leave of absence until the end of the year due to the fact that she is at increased risk to contract COVID-19 infection due to above medical conditions.  She said there is possibility that they will be furloughed after the end of the year if the pandemic does not get better.    Past medical history, past surgical history, family history reviewed-no changes    Social history reviewed-no changes    Allergies reviewed-no changes    Medications reviewed-no  "changes        ROS     As per HPI, the rest are negative.       Objective:     /70 (BP Location: Left arm, Patient Position: Sitting, BP Cuff Size: Adult)   Pulse 90   Temp 36.5 °C (97.7 °F) (Temporal)   Resp 14   Ht 1.575 m (5' 2\")   Wt 67.6 kg (149 lb)   SpO2 97%   BMI 27.25 kg/m²      Physical Exam     Examined alert, awake, oriented, not in distress    Neck-supple, no lymphadenopathy, no thyromegaly  Lungs-clear to auscultation, no rales, no wheezes  Heart-regular rate and rhythm, no murmur  Extremities-no edema, clubbing, cyanosis, foot exam-strong pedal pulses, no skin changes, no swelling, no evidence of open sores, ulcers, blisters, she does have dry skin of the heels and the plantar aspect of the forefoot    Hospital Outpatient Visit on 10/27/2020   Component Date Value Ref Range Status   • Sodium 10/27/2020 141  135 - 145 mmol/L Final   • Potassium 10/27/2020 4.4  3.6 - 5.5 mmol/L Final   • Chloride 10/27/2020 103  96 - 112 mmol/L Final   • Co2 10/27/2020 26  20 - 33 mmol/L Final   • Anion Gap 10/27/2020 12.0  7.0 - 16.0 Final   • Glucose 10/27/2020 90  65 - 99 mg/dL Final   • Bun 10/27/2020 15  8 - 22 mg/dL Final   • Creatinine 10/27/2020 0.76  0.50 - 1.40 mg/dL Final   • Calcium 10/27/2020 9.8  8.5 - 10.5 mg/dL Final   • AST(SGOT) 10/27/2020 22  12 - 45 U/L Final   • ALT(SGPT) 10/27/2020 23  2 - 50 U/L Final   • Alkaline Phosphatase 10/27/2020 71  30 - 99 U/L Final   • Total Bilirubin 10/27/2020 0.5  0.1 - 1.5 mg/dL Final   • Albumin 10/27/2020 4.7  3.2 - 4.9 g/dL Final   • Total Protein 10/27/2020 7.7  6.0 - 8.2 g/dL Final   • Globulin 10/27/2020 3.0  1.9 - 3.5 g/dL Final   • A-G Ratio 10/27/2020 1.6  g/dL Final   • Cholesterol,Tot 10/27/2020 212* 100 - 199 mg/dL Final   • Triglycerides 10/27/2020 147  0 - 149 mg/dL Final   • HDL 10/27/2020 74  >=40 mg/dL Final   • LDL 10/27/2020 109* <100 mg/dL Final   • Fasting Status 10/27/2020 Fasting   Final   • GFR If  10/27/2020 >60  " >60 mL/min/1.73 m 2 Final   • GFR If Non  10/27/2020 >60  >60 mL/min/1.73 m 2 Final              The 10-year ASCVD risk score (Ingallsbessie AVELAR Jr., et al., 2013) is: 3.5%     Assessment/Plan:        1. Essential hypertension  Controlled on losartan.    2. Dyslipidemia  Total cholesterol and LDL cholesterol higher than before.  10-year ASCVD risk is still low at 3.5%.  She will continue to manage this by watching her diet.  Discussed at length diet that she needs to follow.  Advise regular exercises and keeping a healthy weight.    3. Mild intermittent asthma without complication  Currently stable and controlled on her inhaler.    4. Bronchiectasis without complication (HCC)  Currently asymptomatic.    5. Environmental allergies  At this time she is doing well and symptoms are under control on Singulair and Zyrtec.    6. Itching  Symptomatic typical for neuropathy.  Advised to use over-the-counter Aquaphor to help with the dryness because this may be causing the itching.    7. Encounter for screening mammogram for malignant neoplasm of breast  She is due for screening mammogram and she was given order to get this done.    She will return for separate visit for GYN exam/Pap smear in a month.      Please note that this dictation was created using voice recognition software. I have worked with consultants from the vendor as well as technical experts from [employer] to optimize the interface. I have made every reasonable attempt to correct obvious errors, but I expect that there are errors of grammar and possibly content I did not discover before finalizing the note.

## 2020-12-08 ENCOUNTER — HOSPITAL ENCOUNTER (OUTPATIENT)
Facility: MEDICAL CENTER | Age: 60
End: 2020-12-08
Attending: FAMILY MEDICINE
Payer: COMMERCIAL

## 2020-12-08 ENCOUNTER — OFFICE VISIT (OUTPATIENT)
Dept: MEDICAL GROUP | Facility: PHYSICIAN GROUP | Age: 60
End: 2020-12-08
Payer: COMMERCIAL

## 2020-12-08 VITALS
HEIGHT: 62 IN | BODY MASS INDEX: 27.1 KG/M2 | DIASTOLIC BLOOD PRESSURE: 70 MMHG | HEART RATE: 80 BPM | SYSTOLIC BLOOD PRESSURE: 120 MMHG | TEMPERATURE: 96.5 F | OXYGEN SATURATION: 96 % | WEIGHT: 147.27 LBS

## 2020-12-08 DIAGNOSIS — Z11.51 SCREENING FOR HUMAN PAPILLOMAVIRUS (HPV): ICD-10-CM

## 2020-12-08 DIAGNOSIS — Z01.419 ENCOUNTER FOR ROUTINE GYNECOLOGICAL EXAMINATION WITH PAPANICOLAOU SMEAR OF CERVIX: ICD-10-CM

## 2020-12-08 DIAGNOSIS — E78.5 DYSLIPIDEMIA: ICD-10-CM

## 2020-12-08 DIAGNOSIS — I10 ESSENTIAL HYPERTENSION: ICD-10-CM

## 2020-12-08 DIAGNOSIS — R73.01 IMPAIRED FASTING BLOOD SUGAR: ICD-10-CM

## 2020-12-08 PROCEDURE — 99000 SPECIMEN HANDLING OFFICE-LAB: CPT | Performed by: FAMILY MEDICINE

## 2020-12-08 PROCEDURE — 99396 PREV VISIT EST AGE 40-64: CPT | Performed by: FAMILY MEDICINE

## 2020-12-08 PROCEDURE — 87624 HPV HI-RISK TYP POOLED RSLT: CPT

## 2020-12-08 PROCEDURE — 88175 CYTOPATH C/V AUTO FLUID REDO: CPT

## 2020-12-08 ASSESSMENT — FIBROSIS 4 INDEX: FIB4 SCORE: 0.9

## 2020-12-08 NOTE — PROGRESS NOTES
Subjective:      Emily Sanchez is a 60 y.o. female who presents with Annual Exam            HPI     Patient is here for routine GYN exam/Pap smear.  No history of abnormal Pap smears.  No history of STDs.  Last Pap smear was in 2017 that came back no evidence of malignancy and negative for HPV.  Patient is  4 para 3, 1 miscarriage.  Status post bilateral tubal ligation.  She is already postmenopausal.  Her mammogram is scheduled this month.  Her last colonoscopy was in 2011 therefore she is due in 2021.  Up-to-date with all immunizations.    I reviewed the following    Past Medical History:   Diagnosis Date   • Bronchiectasis (HCC)    • Bronchitis    • Environmental allergies 7/3/2013   • Hepatitis B carrier (HCC)    • History of pneumothorax    • Hypertension    • Pneumonia    • Positive TB test     treated    • Tuberculosis    • Tuberculosis exposure         Past Surgical History:   Procedure Laterality Date   • CATARACT EXTRACTION WITH IOL     • LIVER BIOPSY     • TUBAL LIGATION         Allergies   Allergen Reactions   • Amoxicillin-Pot Clavulanate Hives   • Seasonal    • Clavulanic Acid Hives and Nausea       Current Outpatient Medications   Medication Sig Dispense Refill   • losartan (COZAAR) 50 MG Tab TAKE 1/2 TABLET BY MOUTH IN THE MORNING THEN TAKE 1 TABLET IN THE EVENING 135 Tab 1   • SYMBICORT 80-4.5 MCG/ACT Aerosol INHALE 1 TO 2 PUFFS BY MOUTH 2 TIMES A DAY. RINSE MOUTH WITH WATER AFTER EACH USE  4   • albuterol 108 (90 Base) MCG/ACT Aero Soln inhalation aerosol Inhale 2 Puffs by mouth every 6 hours as needed for Shortness of Breath. 1 Inhaler 2   • Omega-3 Fatty Acids (FISH OIL) 1000 MG Cap capsule Take 1,000 mg by mouth every day.     • Cholecalciferol (VITAMIN D) 2000 units Cap Take 2,000 Units by mouth every day.     • acetaminophen (TYLENOL) 500 MG Tab Take 1,000 mg by mouth every 8 hours as needed.     • fluticasone (FLONASE) 50 MCG/ACT nasal spray  Spray 1 Spray in nose 2 times a day. 1 Bottle 3   • Multiple Vitamin (MULTIVITAMIN) capsule Take 1 Cap by mouth every day. 100 Cap 0   • aspirin (ASA) 81 MG CHEW chewable tablet Take 1 Tab by mouth every day. 100 Tab 11   • loratadine (CLARITIN) 10 MG TABS Take 10 mg by mouth every day.     • azithromycin (ZITHROMAX) 250 MG Tab Take as directed (Patient not taking: Reported on 8/5/2020) 6 Tab 0     No current facility-administered medications for this visit.         Family History   Problem Relation Age of Onset   • Heart Disease Mother    • Diabetes Mother    • Heart Disease Father    • Hypertension Sister    • Other Sister         1 sister - kidney transplant   • Diabetes Sister    • Diabetes Brother         1 brother   • Drug abuse Brother    • Kidney Disease Brother        Social History     Socioeconomic History   • Marital status:      Spouse name: Not on file   • Number of children: 3   • Years of education: Not on file   • Highest education level: Not on file   Occupational History   • Occupation: nutrition services   Social Needs   • Financial resource strain: Not on file   • Food insecurity     Worry: Not on file     Inability: Not on file   • Transportation needs     Medical: Not on file     Non-medical: Not on file   Tobacco Use   • Smoking status: Never Smoker   • Smokeless tobacco: Never Used   Substance and Sexual Activity   • Alcohol use: Not Currently     Alcohol/week: 0.0 oz   • Drug use: No   • Sexual activity: Yes     Partners: Male   Lifestyle   • Physical activity     Days per week: Not on file     Minutes per session: Not on file   • Stress: Not on file   Relationships   • Social connections     Talks on phone: Not on file     Gets together: Not on file     Attends Anabaptist service: Not on file     Active member of club or organization: Not on file     Attends meetings of clubs or organizations: Not on file     Relationship status: Not on file   • Intimate partner violence     Fear of  "current or ex partner: Not on file     Emotionally abused: Not on file     Physically abused: Not on file     Forced sexual activity: Not on file   Other Topics Concern   • Not on file   Social History Narrative   • Not on file        ROS     As per HPI, the rest are negative.       Objective:     /70 (BP Location: Left arm, Patient Position: Sitting, BP Cuff Size: Adult)   Pulse 80   Temp 35.8 °C (96.5 °F) (Temporal)   Ht 1.575 m (5' 2\")   Wt 66.8 kg (147 lb 4.3 oz)   SpO2 96%   BMI 26.94 kg/m²      Physical Exam  Vitals signs and nursing note reviewed.   Constitutional:       General: She is not in acute distress.     Appearance: She is well-developed. She is not diaphoretic.   HENT:      Head: Normocephalic and atraumatic.      Right Ear: External ear normal.      Left Ear: External ear normal.      Nose: Nose normal.      Mouth/Throat:      Pharynx: No oropharyngeal exudate.   Eyes:      General: No scleral icterus.        Right eye: No discharge.         Left eye: No discharge.      Conjunctiva/sclera: Conjunctivae normal.      Pupils: Pupils are equal, round, and reactive to light.   Neck:      Musculoskeletal: Normal range of motion and neck supple.      Thyroid: No thyromegaly.      Trachea: No tracheal deviation.   Cardiovascular:      Rate and Rhythm: Normal rate and regular rhythm.      Heart sounds: Normal heart sounds. No murmur. No gallop.    Pulmonary:      Effort: Pulmonary effort is normal. No respiratory distress.      Breath sounds: Normal breath sounds. No stridor. No wheezing or rales.   Chest:      Breasts: Breasts are symmetrical.         Right: No inverted nipple, mass, nipple discharge, skin change or tenderness.         Left: No inverted nipple, mass, nipple discharge, skin change or tenderness.   Abdominal:      General: Bowel sounds are normal. There is no distension.      Palpations: Abdomen is soft. There is no mass.      Tenderness: There is no abdominal tenderness. There " is no guarding or rebound.      Hernia: There is no hernia in the left inguinal area or right inguinal area.   Genitourinary:     General: Normal vulva.      Exam position: Supine.      Vagina: Normal. No vaginal discharge.      Cervix: No cervical motion tenderness, discharge or friability.      Uterus: Not deviated, not enlarged, not fixed and not tender.       Adnexa:         Right: No mass, tenderness or fullness.          Left: No mass, tenderness or fullness.        Rectum: Guaiac result negative. No mass, tenderness, anal fissure, external hemorrhoid or internal hemorrhoid. Normal anal tone.   Musculoskeletal: Normal range of motion.         General: No tenderness.   Lymphadenopathy:      Cervical: No cervical adenopathy.      Lower Body: No right inguinal adenopathy. No left inguinal adenopathy.   Skin:     General: Skin is warm.      Coloration: Skin is not pale.      Findings: No erythema or rash.   Neurological:      Mental Status: She is alert and oriented to person, place, and time.      Cranial Nerves: No cranial nerve deficit.      Motor: No abnormal muscle tone.      Coordination: Coordination normal.      Deep Tendon Reflexes: Reflexes normal.   Psychiatric:         Behavior: Behavior normal.         Thought Content: Thought content normal.                      Assessment/Plan:        1. Encounter for routine gynecological examination with Papanicolaou smear of cervix  Complete exam was done.  Pap smear was done.  Specimen was packaged and sent to the lab.  She is due for screening colonoscopy in January 2021.  Advised to call GI specialist office to schedule an appointment.  Keep appointment for her screening mammogram this month.  Up-to-date with all immunizations.  - THINPREP PAP WITH HPV; Future    2. Screening for human papillomavirus (HPV)  Screening HPV was done.  - THINPREP PAP WITH HPV; Future    3. Dyslipidemia  We will do updated blood work next visit.  - Lipid Profile; Future  - Comp  Metabolic Panel; Future  - HEMOGLOBIN A1C; Future  - CBC WITH DIFFERENTIAL; Future    4. Impaired fasting blood sugar  We will do updated blood work next visit.  - Lipid Profile; Future  - Comp Metabolic Panel; Future  - HEMOGLOBIN A1C; Future  - CBC WITH DIFFERENTIAL; Future    5. Essential hypertension  Controlled on her medication.  We will do updated blood work next visit.  - Lipid Profile; Future  - Comp Metabolic Panel; Future  - HEMOGLOBIN A1C; Future  - CBC WITH DIFFERENTIAL; Future    Follow-up in 4 months.      Please note that this dictation was created using voice recognition software. I have worked with consultants from the vendor as well as technical experts from [facility] to optimize the interface. I have made every reasonable attempt to correct obvious errors, but I expect that there are errors of grammar and possibly content I did not discover before finalizing the note.

## 2020-12-10 ENCOUNTER — HOSPITAL ENCOUNTER (OUTPATIENT)
Dept: RADIOLOGY | Facility: MEDICAL CENTER | Age: 60
End: 2020-12-10
Attending: FAMILY MEDICINE
Payer: COMMERCIAL

## 2020-12-10 DIAGNOSIS — Z12.31 VISIT FOR SCREENING MAMMOGRAM: ICD-10-CM

## 2020-12-10 PROCEDURE — 77067 SCR MAMMO BI INCL CAD: CPT

## 2021-03-15 DIAGNOSIS — Z23 NEED FOR VACCINATION: ICD-10-CM

## 2021-03-30 ENCOUNTER — HOSPITAL ENCOUNTER (OUTPATIENT)
Dept: LAB | Facility: MEDICAL CENTER | Age: 61
End: 2021-03-30
Attending: FAMILY MEDICINE
Payer: COMMERCIAL

## 2021-03-30 DIAGNOSIS — E78.5 DYSLIPIDEMIA: ICD-10-CM

## 2021-03-30 DIAGNOSIS — I10 ESSENTIAL HYPERTENSION: ICD-10-CM

## 2021-03-30 DIAGNOSIS — R73.01 IMPAIRED FASTING BLOOD SUGAR: ICD-10-CM

## 2021-03-30 LAB
ALBUMIN SERPL BCP-MCNC: 4.5 G/DL (ref 3.2–4.9)
ALBUMIN/GLOB SERPL: 1.5 G/DL
ALP SERPL-CCNC: 61 U/L (ref 30–99)
ALT SERPL-CCNC: 18 U/L (ref 2–50)
ANION GAP SERPL CALC-SCNC: 9 MMOL/L (ref 7–16)
AST SERPL-CCNC: 20 U/L (ref 12–45)
BASOPHILS # BLD AUTO: 0.8 % (ref 0–1.8)
BASOPHILS # BLD: 0.06 K/UL (ref 0–0.12)
BILIRUB SERPL-MCNC: 0.3 MG/DL (ref 0.1–1.5)
BUN SERPL-MCNC: 18 MG/DL (ref 8–22)
CALCIUM SERPL-MCNC: 9.2 MG/DL (ref 8.5–10.5)
CHLORIDE SERPL-SCNC: 101 MMOL/L (ref 96–112)
CHOLEST SERPL-MCNC: 170 MG/DL (ref 100–199)
CO2 SERPL-SCNC: 25 MMOL/L (ref 20–33)
CREAT SERPL-MCNC: 0.68 MG/DL (ref 0.5–1.4)
EOSINOPHIL # BLD AUTO: 0.47 K/UL (ref 0–0.51)
EOSINOPHIL NFR BLD: 6.2 % (ref 0–6.9)
ERYTHROCYTE [DISTWIDTH] IN BLOOD BY AUTOMATED COUNT: 41.3 FL (ref 35.9–50)
EST. AVERAGE GLUCOSE BLD GHB EST-MCNC: 105 MG/DL
FASTING STATUS PATIENT QL REPORTED: NORMAL
GLOBULIN SER CALC-MCNC: 3 G/DL (ref 1.9–3.5)
GLUCOSE SERPL-MCNC: 93 MG/DL (ref 65–99)
HBA1C MFR BLD: 5.3 % (ref 4–5.6)
HCT VFR BLD AUTO: 42.6 % (ref 37–47)
HDLC SERPL-MCNC: 74 MG/DL
HGB BLD-MCNC: 14 G/DL (ref 12–16)
IMM GRANULOCYTES # BLD AUTO: 0.02 K/UL (ref 0–0.11)
IMM GRANULOCYTES NFR BLD AUTO: 0.3 % (ref 0–0.9)
LDLC SERPL CALC-MCNC: 84 MG/DL
LYMPHOCYTES # BLD AUTO: 2.71 K/UL (ref 1–4.8)
LYMPHOCYTES NFR BLD: 35.5 % (ref 22–41)
MCH RBC QN AUTO: 30.4 PG (ref 27–33)
MCHC RBC AUTO-ENTMCNC: 32.9 G/DL (ref 33.6–35)
MCV RBC AUTO: 92.4 FL (ref 81.4–97.8)
MONOCYTES # BLD AUTO: 0.61 K/UL (ref 0–0.85)
MONOCYTES NFR BLD AUTO: 8 % (ref 0–13.4)
NEUTROPHILS # BLD AUTO: 3.76 K/UL (ref 2–7.15)
NEUTROPHILS NFR BLD: 49.2 % (ref 44–72)
NRBC # BLD AUTO: 0 K/UL
NRBC BLD-RTO: 0 /100 WBC
PLATELET # BLD AUTO: 297 K/UL (ref 164–446)
PMV BLD AUTO: 10.4 FL (ref 9–12.9)
POTASSIUM SERPL-SCNC: 4.2 MMOL/L (ref 3.6–5.5)
PROT SERPL-MCNC: 7.5 G/DL (ref 6–8.2)
RBC # BLD AUTO: 4.61 M/UL (ref 4.2–5.4)
SODIUM SERPL-SCNC: 135 MMOL/L (ref 135–145)
TRIGL SERPL-MCNC: 60 MG/DL (ref 0–149)
WBC # BLD AUTO: 7.6 K/UL (ref 4.8–10.8)

## 2021-03-30 PROCEDURE — 83036 HEMOGLOBIN GLYCOSYLATED A1C: CPT

## 2021-03-30 PROCEDURE — 85025 COMPLETE CBC W/AUTO DIFF WBC: CPT

## 2021-03-30 PROCEDURE — 80061 LIPID PANEL: CPT

## 2021-03-30 PROCEDURE — 36415 COLL VENOUS BLD VENIPUNCTURE: CPT

## 2021-03-30 PROCEDURE — 80053 COMPREHEN METABOLIC PANEL: CPT

## 2021-04-08 ENCOUNTER — OFFICE VISIT (OUTPATIENT)
Dept: MEDICAL GROUP | Facility: PHYSICIAN GROUP | Age: 61
End: 2021-04-08
Payer: COMMERCIAL

## 2021-04-08 VITALS
TEMPERATURE: 96.4 F | BODY MASS INDEX: 26.82 KG/M2 | WEIGHT: 145.72 LBS | HEIGHT: 62 IN | SYSTOLIC BLOOD PRESSURE: 110 MMHG | DIASTOLIC BLOOD PRESSURE: 70 MMHG | HEART RATE: 82 BPM | OXYGEN SATURATION: 97 %

## 2021-04-08 DIAGNOSIS — E78.5 DYSLIPIDEMIA: ICD-10-CM

## 2021-04-08 DIAGNOSIS — J45.20 MILD INTERMITTENT ASTHMA WITHOUT COMPLICATION: ICD-10-CM

## 2021-04-08 DIAGNOSIS — I10 ESSENTIAL HYPERTENSION: ICD-10-CM

## 2021-04-08 DIAGNOSIS — M25.551 RIGHT HIP PAIN: ICD-10-CM

## 2021-04-08 PROCEDURE — 99214 OFFICE O/P EST MOD 30 MIN: CPT | Performed by: FAMILY MEDICINE

## 2021-04-08 RX ORDER — DICLOFENAC SODIUM 75 MG/1
75 TABLET, DELAYED RELEASE ORAL 2 TIMES DAILY
COMMUNITY
End: 2022-09-29

## 2021-04-08 ASSESSMENT — FIBROSIS 4 INDEX: FIB4 SCORE: 0.97

## 2021-04-08 ASSESSMENT — PATIENT HEALTH QUESTIONNAIRE - PHQ9: CLINICAL INTERPRETATION OF PHQ2 SCORE: 0

## 2021-04-08 NOTE — LETTER
[facility]  Noni Jansen M.D.  [address]  [place] [address]  Fax: [phone]   Authorization for Release/Disclosure of   Protected Health Information   Name: PAMELA SANCHEZ : 1960 SSN: [id number]   Address: [address] [place] [address] [place] [address] Phone:    [phone] (home)    I authorize the entity listed below to release/disclose the PHI below to:   [facility]/Noni Jansen M.D. and Noni Jansen M.D.   Provider or Entity Name:    [facility]   Address   City, State, Zip   Phone:      Fax:     Reason for request: continuity of care   Information to be released:    [  ] LAST COLONOSCOPY,  including any PATH REPORT and follow-up  [  ] LAST FIT/COLOGUARD RESULT [  ] LAST DEXA  [  ] LAST MAMMOGRAM  [  ] LAST PAP  [  ] LAST LABS [  ] RETINA EXAM REPORT  [  ] IMMUNIZATION RECORDS  [  ] Release all info      [  ] Check here and initial the line next to each item to release ALL health information INCLUDING  _____ Care and treatment for drug and / or alcohol abuse  _____ HIV testing, infection status, or AIDS  _____ Genetic Testing    DATES OF SERVICE OR TIME PERIOD TO BE DISCLOSED: _____________  I understand and acknowledge that:  * This Authorization may be revoked at any time by you in writing, except if your health information has already been used or disclosed.  * Your health information that will be used or disclosed as a result of you signing this authorization could be re-disclosed by the recipient. If this occurs, your re-disclosed health information may no longer be protected by State or Federal laws.  * You may refuse to sign this Authorization. Your refusal will not affect your ability to obtain treatment.  * This Authorization becomes effective upon signing and will  on (date) __________.      If no date is indicated, this Authorization will  one (1) year from the signature date.    Name: Pamela Sanchez    Signature:   Date:     2021            PLEASE FAX REQUESTED RECORDS BACK TO: [phone]

## 2021-04-09 PROBLEM — F41.8 DEPRESSION WITH ANXIETY: Status: RESOLVED | Noted: 2018-10-25 | Resolved: 2021-04-09

## 2021-04-09 PROBLEM — F43.23 SITUATIONAL MIXED ANXIETY AND DEPRESSIVE DISORDER: Status: RESOLVED | Noted: 2019-03-14 | Resolved: 2021-04-09

## 2021-04-09 NOTE — PROGRESS NOTES
"Subjective:      Emily Sanchez is a 61 y.o. female who presents with Pain (leg)            HPI     Patient returns for follow-up of her medical problems.    In terms of her hypertension, this is well controlled on losartan.    She continues to manage her dyslipidemia with her own efforts only.  Blood work was done before this visit.    For her mild intermittent asthma, she continues to use Symbicort inhaler regularly with good control of her problem.    She went to this [facility] urgent care on 4/6/2021 which was 2 days ago for 3-week duration of right hip pain located in the lateral side.  She said they did x-ray of the hip and the lumbar spine and she was told she has arthritis.  She was given diclofenac 75 mg twice a day which she has been taking.  She was also sent for physical therapy.  She said initially she had difficulty ambulating and putting weight on the right lower extremity but this has improved.  She has an appointment at [facility] in [place] for follow-up in a month to see how she is doing with conservative measures.    Past medical history, past surgical history, family history reviewed-no changes    Social history reviewed-no changes    Allergies reviewed-no changes    Medications reviewed-no changes    ROS     As per HPI, the rest are negative.       Objective:     /70 (BP Location: Left arm, Patient Position: Sitting, BP Cuff Size: Adult)   Pulse 82   Temp (!) 35.8 °C (96.4 °F) (Temporal)   Ht 1.575 m (5' 2\")   Wt 66.1 kg (145 lb 11.6 oz)   SpO2 97%   BMI 26.65 kg/m²      Physical Exam     Examined alert, awake, oriented, not in distress    Neck-supple, no lymphadenopathy, no thyromegaly  Lungs-clear to auscultation, no rales, no wheezes  Heart-regular rate and rhythm, no murmur  Extremities-no edema, clubbing, cyanosis       Hospital Outpatient Visit on 03/30/2021   Component Date Value Ref Range Status   • Cholesterol,Tot 03/30/2021 170  100 - 199 mg/dL Final   • Triglycerides " 03/30/2021 60  0 - 149 mg/dL Final   • HDL 03/30/2021 74  >=40 mg/dL Final   • LDL 03/30/2021 84  <100 mg/dL Final   • WBC 03/30/2021 7.6  4.8 - 10.8 K/uL Final   • RBC 03/30/2021 4.61  4.20 - 5.40 M/uL Final   • Hemoglobin 03/30/2021 14.0  12.0 - 16.0 g/dL Final   • Hematocrit 03/30/2021 42.6  37.0 - 47.0 % Final   • MCV 03/30/2021 92.4  81.4 - 97.8 fL Final   • MCH 03/30/2021 30.4  27.0 - 33.0 pg Final   • MCHC 03/30/2021 32.9* 33.6 - 35.0 g/dL Final   • RDW 03/30/2021 41.3  35.9 - 50.0 fL Final   • Platelet Count 03/30/2021 297  164 - 446 K/uL Final   • MPV 03/30/2021 10.4  9.0 - 12.9 fL Final   • Neutrophils-Polys 03/30/2021 49.20  44.00 - 72.00 % Final   • Lymphocytes 03/30/2021 35.50  22.00 - 41.00 % Final   • Monocytes 03/30/2021 8.00  0.00 - 13.40 % Final   • Eosinophils 03/30/2021 6.20  0.00 - 6.90 % Final   • Basophils 03/30/2021 0.80  0.00 - 1.80 % Final   • Immature Granulocytes 03/30/2021 0.30  0.00 - 0.90 % Final   • Nucleated RBC 03/30/2021 0.00  /100 WBC Final   • Neutrophils (Absolute) 03/30/2021 3.76  2.00 - 7.15 K/uL Final    Includes immature neutrophils, if present.   • Lymphs (Absolute) 03/30/2021 2.71  1.00 - 4.80 K/uL Final   • Monos (Absolute) 03/30/2021 0.61  0.00 - 0.85 K/uL Final   • Eos (Absolute) 03/30/2021 0.47  0.00 - 0.51 K/uL Final   • Baso (Absolute) 03/30/2021 0.06  0.00 - 0.12 K/uL Final   • Immature Granulocytes (abs) 03/30/2021 0.02  0.00 - 0.11 K/uL Final   • NRBC (Absolute) 03/30/2021 0.00  K/uL Final   • Glycohemoglobin 03/30/2021 5.3  4.0 - 5.6 % Final    Comment: Increased risk for diabetes:  5.7 -6.4%  Diabetes:  >6.4%  Glycemic control for adults with diabetes:  <7.0%  The above interpretations are per ADA guidelines.  Diagnosis  of diabetes mellitus on the basis of elevated Hemoglobin A1c  should be confirmed by repeating the Hb A1c test.     • Est Avg Glucose 03/30/2021 105  mg/dL Final    Comment: The eAG calculation is based on the A1c-Derived Daily Glucose  (ADAG)  study.  See the ADA's website for additional information.     • Sodium 03/30/2021 135  135 - 145 mmol/L Final   • Potassium 03/30/2021 4.2  3.6 - 5.5 mmol/L Final   • Chloride 03/30/2021 101  96 - 112 mmol/L Final   • Co2 03/30/2021 25  20 - 33 mmol/L Final   • Anion Gap 03/30/2021 9.0  7.0 - 16.0 Final   • Glucose 03/30/2021 93  65 - 99 mg/dL Final   • Bun 03/30/2021 18  8 - 22 mg/dL Final   • Creatinine 03/30/2021 0.68  0.50 - 1.40 mg/dL Final   • Calcium 03/30/2021 9.2  8.5 - 10.5 mg/dL Final   • AST(SGOT) 03/30/2021 20  12 - 45 U/L Final   • ALT(SGPT) 03/30/2021 18  2 - 50 U/L Final   • Alkaline Phosphatase 03/30/2021 61  30 - 99 U/L Final   • Total Bilirubin 03/30/2021 0.3  0.1 - 1.5 mg/dL Final   • Albumin 03/30/2021 4.5  3.2 - 4.9 g/dL Final   • Total Protein 03/30/2021 7.5  6.0 - 8.2 g/dL Final   • Globulin 03/30/2021 3.0  1.9 - 3.5 g/dL Final   • A-G Ratio 03/30/2021 1.5  g/dL Final   • Fasting Status 03/30/2021 Fasting   Final   • GFR If  03/30/2021 >60  >60 mL/min/1.73 m 2 Final   • GFR If Non  03/30/2021 >60  >60 mL/min/1.73 m 2 Final          Assessment/Plan:        1. Essential hypertension  Well-controlled on losartan.  Continue medication.  - Lipid Profile; Future  - Comp Metabolic Panel; Future    2. Dyslipidemia  LDL decreased from 109-84.  This is now at goal.  HDL still runs very good at 74.  Continue with own efforts to manage this.  - Lipid Profile; Future  - Comp Metabolic Panel; Future    3. Mild intermittent asthma without complication  Stable and controlled on Symbicort.  - Lipid Profile; Future  - Comp Metabolic Panel; Future    4. Right hip pain  I will get records from [facility] urgent care.  I told her she can take the diclofenac 75 mg 1 tablet twice a day for 1 week and to make sure to take it with food.  After that she can take it as needed.  She will start physical therapy soon.  Hopefully she will improve with conservative measures.      Follow-up in 6  months or sooner if needed.      Please note that this dictation was created using voice recognition software. I have worked with consultants from the vendor as well as technical experts from [facility] to optimize the interface. I have made every reasonable attempt to correct obvious errors, but I expect that there are errors of grammar and possibly content I did not discover before finalizing the note.

## 2021-05-03 ENCOUNTER — PHYSICAL THERAPY (OUTPATIENT)
Dept: PHYSICAL THERAPY | Facility: REHABILITATION | Age: 61
End: 2021-05-03
Attending: PHYSICIAN ASSISTANT
Payer: COMMERCIAL

## 2021-05-03 DIAGNOSIS — M70.71 OTHER BURSITIS OF HIP, RIGHT HIP: ICD-10-CM

## 2021-05-03 DIAGNOSIS — M47.897 OTHER SPONDYLOSIS, LUMBOSACRAL REGION: ICD-10-CM

## 2021-05-03 DIAGNOSIS — S33.6XXA SPRAIN OF SACROILIAC JOINT, INITIAL ENCOUNTER: ICD-10-CM

## 2021-05-03 PROCEDURE — 97110 THERAPEUTIC EXERCISES: CPT

## 2021-05-03 ASSESSMENT — ENCOUNTER SYMPTOMS
PAIN SCALE AT LOWEST: 0
PAIN SCALE AT HIGHEST: 6
PAIN SCALE: 0

## 2021-05-03 NOTE — OP THERAPY EVALUATION
Outpatient Physical Therapy  INITIAL EVALUATION    [facility] [place] [address] [facility], [address]  [place] [address]  Phone:  [phone]    Date of Evaluation: 2021    Patient: Emily Sanchez  YOB: 1960  MRN: [id number]     Referring Provider: Dulce Maria Beckwith P.A.-C.  [address]  [facility] [place] [address]   Referring Diagnosis Other bursitis of hip, right hip [M70.71];Sprain of sacroiliac joint, initial encounter [S33.6XXA];Other spondylosis, lumbosacral region [M47.897]     Time Calculation    Start time: 1435  Stop time: 1525 Time Calculation (min): 50 minutes         Chief Complaint: No chief complaint on file.    Visit Diagnoses     ICD-10-CM   1. Other bursitis of hip, right hip  M70.71   2. Sprain of sacroiliac joint, initial encounter  S33.6XXA   3. 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  Previously could walk as much as she could, but by the end of the day very painful, goes away quickly with sitting, but will come back sooner with activity. Denies any trauma or history of LE or lumbar spine issues.  Reports pain has improved,     Pain Description:  Lateral leg to ankle, sometimes in back.  Ache pain.    Pain Irritability:  Low    Pain AM/PM Pattern:  None     Better:  Sitting, pain medication (anti inflammatories) helped when she was on them, but now pain is back to where it was prior to medication. Heating pad (short term)     Worse:  Walking (an hour)    Increased time needed as patient has difficulty focusing with conversation, answering different questions than asked.   Pain:     Current pain ratin    At best pain ratin    At worst pain ratin      Past Medical History:   Diagnosis Date   • Bronchiectasis (HCC)    •  Bronchitis    • Environmental allergies 7/3/2013   • Hepatitis B carrier (HCC)    • History of pneumothorax    • Hypertension    • Pneumonia    • Positive TB test     treated 1990's   • Tuberculosis    • Tuberculosis exposure      Past Surgical History:   Procedure Laterality Date   • CATARACT EXTRACTION WITH IOL     • LIVER BIOPSY     • TUBAL LIGATION       Social History     Tobacco Use   • Smoking status: Never Smoker   • Smokeless tobacco: Never Used   Substance Use Topics   • Alcohol use: Not Currently     Alcohol/week: 0.0 oz     Family and Occupational History     Socioeconomic History   • Marital status:      Spouse name: Not on file   • Number of children: 3   • Years of education: Not on file   • Highest education level: Not on file   Occupational History   • Occupation: nutrition services       Objective     Lumbar Screen  Lumbar range of motion within normal limits.    Neurological Testing     Sensation     Hip   Left Hip   Intact: light touch    Right Hip   Intact: light touch    Reflexes   Left   Patellar (L4): normal (2+)  Achilles (S1): normal (2+)    Right   Patellar (L4): normal (2+)  Achilles (S1): normal (2+)    Additional Neurological Details  Normal posterior neuro tension testing     Active Range of Motion     Right Hip   Flexion: WFL  Extension: 5 degrees   External rotation (90/90): 45 degrees   Internal rotation (90/90): 15 degrees with pain    Strength:      Right Hip   Planes of Motion   Flexion: 4+  Extension: 4  Abduction: 3  External rotation: 3+        Therapeutic Exercises (CPT 33339):       Therapeutic Exercise Summary: SL ABD - 5 x 20 seconds  Side Planks - 10 x 5 seconds     Next visit:  1/2 kneeling hip flexor stretch for knowledge of position  Clams           Time-based treatments/modalities:    Physical Therapy Timed Treatment Charges  Therapeutic exercise minutes (CPT 99485): 15 minutes      Assessment, Response and Plan:   Impairments: abnormal ADL function, impaired  functional mobility and lacks appropriate home exercise program    Assessment details:  Patient demonstrate signs and symptoms that are consistent with somatic referral pain from gluteus medius, indicated by increased symptoms with gluteus medius stretch and improvement in symptoms with isometric gluteus medius activity.  Current impairments include strength, ROM, balance, activity tolerance, proprioception, and pain which are all negatively impacting functional mobility.  Anticipate patient will progress well with physical therapy.  Focus of therapy will be gluteus medius strengthening, with gradual progression from split stance to single leg stance activities.  Muscular endurance primary limiting factor in pain.       Goals:   Short Term Goals:   Patient will demonstrate compliance with HEP in 1 week in order to progress with physical therapy  Patient will demonstrate full pain free hip IR ROM in order to improve participate in vocation  Patient will demonstrate 4/5 hip ABD Strength in order to improve participate in vocaiton  Short term goal time span:  2-4 weeks      Long Term Goals:    Patient will demonstrate independence with HEP in 4 week in order to progress towards d/c from physical therapy  Patient will demonstrate full pain free hip IR w/ flexion ROM in order to improve participate in vocation  Patient will demonstrate 5/5 hip ABD Strength in order to improve participate in vocaiton  Long term goal time span:  6-8 weeks    Plan:   Therapy options:  Physical therapy treatment to continue  Planned therapy interventions:  Neuromuscular Re-education (CPT 10658), Manual Therapy (CPT 98009), Gait Training (CPT 52967), Therapeutic Activities (CPT 92105) and Therapeutic Exercise (CPT 81422)  Frequency:  2x week  Duration in weeks:  6  Duration in visits:  12  Discussed with:  Patient      Functional Assessment Used        Referring provider co-signature:  I have reviewed this plan of care and my co-signature  certifies the need for services.    Certification Period: 05/03/2021 to  06/21/21    Physician Signature: ________________________________ Date: ______________

## 2021-05-05 ENCOUNTER — PHYSICAL THERAPY (OUTPATIENT)
Dept: PHYSICAL THERAPY | Facility: REHABILITATION | Age: 61
End: 2021-05-05
Attending: PHYSICIAN ASSISTANT
Payer: COMMERCIAL

## 2021-05-05 DIAGNOSIS — M47.897 OTHER SPONDYLOSIS, LUMBOSACRAL REGION: ICD-10-CM

## 2021-05-05 DIAGNOSIS — S33.6XXA SPRAIN OF SACROILIAC JOINT, INITIAL ENCOUNTER: ICD-10-CM

## 2021-05-05 DIAGNOSIS — M70.71 OTHER BURSITIS OF HIP, RIGHT HIP: ICD-10-CM

## 2021-05-05 PROCEDURE — 97110 THERAPEUTIC EXERCISES: CPT

## 2021-05-05 NOTE — OP THERAPY DAILY TREATMENT
Outpatient Physical Therapy  DAILY TREATMENT     [facility] Outpatient Physical Therapy [address], [address]  [place] [address]  Phone:  [phone]    Date: 05/05/2021    Patient: Emily Sanchez  YOB: 1960  MRN: [id number]     Time Calculation    Start time: 1430  Stop time: 1500 Time Calculation (min): 30 minutes         Chief Complaint: No chief complaint on file.    Visit #: 2    SUBJECTIVE:  Reports able to walk 7 miles without pain yesterday.  Normally feel it a little bit everyday, and a lot for the next 2x/week.  Haven't noticed my pain since     OBJECTIVE:  Current objective measures:  None taken.          Therapeutic Exercises (CPT 84313):       Therapeutic Exercise Summary: SL ABD - 5 x 20 seconds  Side Planks - 10 x 5 seconds   1/2 kneeling hip flexor stretch - 3 x 30 seconds   Split squats hold - 5 x 10 seconds       Time-based treatments/modalities:    Physical Therapy Timed Treatment Charges  Therapeutic exercise minutes (CPT 97093): 30 minutes    ASSESSMENT:   Response to treatment: Progressed program today.  Anticipate patient will continue to progress to eventual independent HEP to maintain hip strength.  Overall 5/10 fatigue post session.     PLAN/RECOMMENDATIONS:   Plan for treatment: therapy treatment to continue next visit.  Planned interventions for next visit: continue with current treatment.

## 2021-05-10 ENCOUNTER — PHYSICAL THERAPY (OUTPATIENT)
Dept: PHYSICAL THERAPY | Facility: REHABILITATION | Age: 61
End: 2021-05-10
Attending: PHYSICIAN ASSISTANT
Payer: COMMERCIAL

## 2021-05-10 DIAGNOSIS — M47.897 OTHER SPONDYLOSIS, LUMBOSACRAL REGION: ICD-10-CM

## 2021-05-10 DIAGNOSIS — M70.71 OTHER BURSITIS OF HIP, RIGHT HIP: ICD-10-CM

## 2021-05-10 DIAGNOSIS — M77.8 LEFT SHOULDER TENDINITIS: ICD-10-CM

## 2021-05-10 DIAGNOSIS — S33.6XXA SPRAIN OF SACROILIAC JOINT, INITIAL ENCOUNTER: ICD-10-CM

## 2021-05-10 PROCEDURE — 97110 THERAPEUTIC EXERCISES: CPT

## 2021-05-10 NOTE — OP THERAPY DISCHARGE SUMMARY
Outpatient Physical Therapy  DISCHARGE SUMMARY NOTE      [facility] [place] [id number] [facility], [address]  [place] [address]  Phone:  [phone]    Date of Visit: 05/10/2021    Patient: Emily Sanchez  YOB: 1960  MRN: [id number]     Referring Provider: Dulce Maria Beckwith P.A.-C.  [id number]  [facility] [place] [address]   Referring Diagnosis Other bursitis of hip, right hip [M70.71];Sprain of sacroiliac joint, initial encounter [S33.6XXA];Other spondylosis, lumbosacral region [M47.897]     Functional Assessment Used        Your patient is being discharged from Physical Therapy with the following comments:   · Goals met    Comments:  Full pain free quadrant testing, no somatic referral pain with palpation.     Limitations Remaining:  Lateral hip endurance.  Significant fatigue with 5 x 20 second Sidelying ABD.     Recommendations:  Continue HEP for 4-6 weeks.  If pain present (hasn't been since evaluation), stop activity to not flare up pain.      Magnus Lawrence, PT    Date: 5/10/2021

## 2021-05-10 NOTE — OP THERAPY DAILY TREATMENT
Outpatient Physical Therapy  DAILY TREATMENT     [facility] Outpatient Physical Therapy [address], [address]  [place] [address]  Phone:  [phone]    Date: 05/10/2021    Patient: Emily Sanchez  YOB: 1960  MRN: [id number]     Time Calculation    Start time: 1403  Stop time: 1430 Time Calculation (min): 27 minutes     Chief Complaint: No chief complaint on file.    Visit #: 3    SUBJECTIVE:  Went to the doctor, states she doesn't need to be seen again. I haven't had my pain since evaluation.  Reports fatigue after walking > 7 miles     OBJECTIVE:  Current objective measures:   Pain free to quadrant testing.         Therapeutic Exercises (CPT 03121):       Therapeutic Exercise Summary: SL ABD - 5 x 20 seconds  Side Planks - 10 x 5 seconds   1/2 kneeling hip flexor stretch - 3 x 30 seconds   Split squats hold - 5 x 10 seconds     Time-based treatments/modalities:    Physical Therapy Timed Treatment Charges  Therapeutic exercise minutes (CPT 12547): 30 minutes    ASSESSMENT:   Response to treatment:  Educated patient on continuing to perform HEP for next 4-6 weeks to ensure patient's lateral hip strength maintains.  Patient agreeable.  D/c from PT.     PLAN/RECOMMENDATIONS:   Plan for treatment: therapy treatment to continue next visit.  Planned interventions for next visit: continue with current treatment.

## 2021-05-12 ENCOUNTER — APPOINTMENT (OUTPATIENT)
Dept: PHYSICAL THERAPY | Facility: REHABILITATION | Age: 61
End: 2021-05-12
Attending: PHYSICIAN ASSISTANT
Payer: COMMERCIAL

## 2021-05-17 ENCOUNTER — APPOINTMENT (OUTPATIENT)
Dept: PHYSICAL THERAPY | Facility: REHABILITATION | Age: 61
End: 2021-05-17
Attending: PHYSICIAN ASSISTANT
Payer: COMMERCIAL

## 2021-05-19 ENCOUNTER — APPOINTMENT (OUTPATIENT)
Dept: PHYSICAL THERAPY | Facility: REHABILITATION | Age: 61
End: 2021-05-19
Attending: PHYSICIAN ASSISTANT
Payer: COMMERCIAL

## 2021-05-24 ENCOUNTER — APPOINTMENT (OUTPATIENT)
Dept: PHYSICAL THERAPY | Facility: REHABILITATION | Age: 61
End: 2021-05-24
Attending: PHYSICIAN ASSISTANT
Payer: COMMERCIAL

## 2021-05-26 ENCOUNTER — APPOINTMENT (OUTPATIENT)
Dept: PHYSICAL THERAPY | Facility: REHABILITATION | Age: 61
End: 2021-05-26
Attending: PHYSICIAN ASSISTANT
Payer: COMMERCIAL

## 2021-09-25 ENCOUNTER — HOSPITAL ENCOUNTER (OUTPATIENT)
Dept: LAB | Facility: MEDICAL CENTER | Age: 61
End: 2021-09-25
Attending: FAMILY MEDICINE
Payer: COMMERCIAL

## 2021-09-25 DIAGNOSIS — J45.20 MILD INTERMITTENT ASTHMA WITHOUT COMPLICATION: ICD-10-CM

## 2021-09-25 DIAGNOSIS — I10 ESSENTIAL HYPERTENSION: ICD-10-CM

## 2021-09-25 DIAGNOSIS — E78.5 DYSLIPIDEMIA: ICD-10-CM

## 2021-09-25 LAB
ALBUMIN SERPL BCP-MCNC: 4.7 G/DL (ref 3.2–4.9)
ALBUMIN/GLOB SERPL: 1.6 G/DL
ALP SERPL-CCNC: 66 U/L (ref 30–99)
ALT SERPL-CCNC: 23 U/L (ref 2–50)
ANION GAP SERPL CALC-SCNC: 12 MMOL/L (ref 7–16)
AST SERPL-CCNC: 22 U/L (ref 12–45)
BILIRUB SERPL-MCNC: 0.5 MG/DL (ref 0.1–1.5)
BUN SERPL-MCNC: 16 MG/DL (ref 8–22)
CALCIUM SERPL-MCNC: 9.5 MG/DL (ref 8.5–10.5)
CHLORIDE SERPL-SCNC: 105 MMOL/L (ref 96–112)
CHOLEST SERPL-MCNC: 172 MG/DL (ref 100–199)
CO2 SERPL-SCNC: 25 MMOL/L (ref 20–33)
CREAT SERPL-MCNC: 0.63 MG/DL (ref 0.5–1.4)
FASTING STATUS PATIENT QL REPORTED: NORMAL
GLOBULIN SER CALC-MCNC: 2.9 G/DL (ref 1.9–3.5)
GLUCOSE SERPL-MCNC: 92 MG/DL (ref 65–99)
HDLC SERPL-MCNC: 74 MG/DL
LDLC SERPL CALC-MCNC: 75 MG/DL
POTASSIUM SERPL-SCNC: 4.2 MMOL/L (ref 3.6–5.5)
PROT SERPL-MCNC: 7.6 G/DL (ref 6–8.2)
SODIUM SERPL-SCNC: 142 MMOL/L (ref 135–145)
TRIGL SERPL-MCNC: 117 MG/DL (ref 0–149)

## 2021-09-25 PROCEDURE — 80061 LIPID PANEL: CPT

## 2021-09-25 PROCEDURE — 80053 COMPREHEN METABOLIC PANEL: CPT

## 2021-09-25 PROCEDURE — 36415 COLL VENOUS BLD VENIPUNCTURE: CPT

## 2021-10-28 ENCOUNTER — OFFICE VISIT (OUTPATIENT)
Dept: MEDICAL GROUP | Facility: PHYSICIAN GROUP | Age: 61
End: 2021-10-28
Payer: COMMERCIAL

## 2021-10-28 VITALS
HEIGHT: 62 IN | DIASTOLIC BLOOD PRESSURE: 70 MMHG | TEMPERATURE: 97 F | HEART RATE: 79 BPM | WEIGHT: 144.62 LBS | OXYGEN SATURATION: 97 % | SYSTOLIC BLOOD PRESSURE: 120 MMHG | BODY MASS INDEX: 26.61 KG/M2

## 2021-10-28 DIAGNOSIS — I10 ESSENTIAL HYPERTENSION: ICD-10-CM

## 2021-10-28 DIAGNOSIS — E78.5 DYSLIPIDEMIA: ICD-10-CM

## 2021-10-28 DIAGNOSIS — J45.20 MILD INTERMITTENT ASTHMA WITHOUT COMPLICATION: ICD-10-CM

## 2021-10-28 DIAGNOSIS — B18.1 HEPATITIS B CARRIER (HCC): ICD-10-CM

## 2021-10-28 PROCEDURE — 99214 OFFICE O/P EST MOD 30 MIN: CPT | Performed by: FAMILY MEDICINE

## 2021-10-28 ASSESSMENT — FIBROSIS 4 INDEX: FIB4 SCORE: 0.94

## 2021-10-28 NOTE — PROGRESS NOTES
"Subjective     Emliy Sanchez is a 61 y.o. female who presents with Hypertension            HPI     Patient returns for follow-up of her medical problems.    In terms of her hypertension, this is under control on losartan without side effects.    She is managing her dyslipidemia with her own efforts only.    For her mild intermittent asthma, she continues to use her Symbicort inhaler regularly with good control of her asthma.  She has a follow-up appointment with her pulmonary specialist in December.    For her history of hepatitis B carrier state, she follows up with the GI specialist once a year.  She gets ultrasound done yearly with no evidence of liver masses.  Her last visit notes from GI specialist in March 2021 her antigen has disappeared and she now has core antibody.  Her last colonoscopy was in July 2010 therefore she is due already for another one.  She had FIT done through [facility] this year which came back negative.    She said she already had her flu shot through her work.  She also had her COVID-19 booster dose.  She will provide me with the dates through [employer].    Past medical history, past surgical history, family history reviewed-no changes    Social history reviewed-no changes    Allergies reviewed-no changes    Medications reviewed-no changes    ROS     As per HPI, the rest are negative.           Objective     /70 (BP Location: Left arm, Patient Position: Sitting, BP Cuff Size: Adult)   Pulse 79   Temp 36.1 °C (97 °F) (Temporal)   Ht 1.575 m (5' 2\")   Wt 65.6 kg (144 lb 10 oz)   SpO2 97%   BMI 26.45 kg/m²      Physical Exam     Examined alert, awake, oriented, not in distress    Neck-supple, no lymphadenopathy, no thyromegaly  Lungs-clear to auscultation, no rales, no wheezes  Heart-regular rate and rhythm, no murmur  Extremities-no edema, clubbing, cyanosis           Hospital Outpatient Visit on 09/25/2021   Component Date Value Ref Range Status   • Sodium 09/25/2021 142  " 135 - 145 mmol/L Final   • Potassium 09/25/2021 4.2  3.6 - 5.5 mmol/L Final   • Chloride 09/25/2021 105  96 - 112 mmol/L Final   • Co2 09/25/2021 25  20 - 33 mmol/L Final   • Anion Gap 09/25/2021 12.0  7.0 - 16.0 Final   • Glucose 09/25/2021 92  65 - 99 mg/dL Final   • Bun 09/25/2021 16  8 - 22 mg/dL Final   • Creatinine 09/25/2021 0.63  0.50 - 1.40 mg/dL Final   • Calcium 09/25/2021 9.5  8.5 - 10.5 mg/dL Final   • AST(SGOT) 09/25/2021 22  12 - 45 U/L Final   • ALT(SGPT) 09/25/2021 23  2 - 50 U/L Final   • Alkaline Phosphatase 09/25/2021 66  30 - 99 U/L Final   • Total Bilirubin 09/25/2021 0.5  0.1 - 1.5 mg/dL Final   • Albumin 09/25/2021 4.7  3.2 - 4.9 g/dL Final   • Total Protein 09/25/2021 7.6  6.0 - 8.2 g/dL Final   • Globulin 09/25/2021 2.9  1.9 - 3.5 g/dL Final   • A-G Ratio 09/25/2021 1.6  g/dL Final   • Cholesterol,Tot 09/25/2021 172  100 - 199 mg/dL Final   • Triglycerides 09/25/2021 117  0 - 149 mg/dL Final   • HDL 09/25/2021 74  >=40 mg/dL Final   • LDL 09/25/2021 75  <100 mg/dL Final   • Fasting Status 09/25/2021 Fasting   Final   • GFR If  09/25/2021 >60  >60 mL/min/1.73 m 2 Final   • GFR If Non  09/25/2021 >60  >60 mL/min/1.73 m 2 Final                  Assessment & Plan        1. Essential hypertension  Controlled on her medication.  - Lipid Profile; Future  - Comp Metabolic Panel; Future  - CBC WITH DIFFERENTIAL; Future    2. Dyslipidemia  All at target with her own efforts only.  Continue managing this with diet, exercise and keeping a healthy weight.  - Lipid Profile; Future  - Comp Metabolic Panel; Future  - CBC WITH DIFFERENTIAL; Future    3. Mild intermittent asthma without complication  Stable and under control.  Continue Symbicort.  Keep appointment with pulmonary specialist in December.  - Lipid Profile; Future  - Comp Metabolic Panel; Future  - CBC WITH DIFFERENTIAL; Future    4. Hepatitis B carrier (HCC)  Continue follow-up with GI yearly.  We will follow  along.  Advised to check with GI specialist the need to have her screening colonoscopy done next year when she goes for her follow-up.    Follow-up in 6 months.      Please note that this dictation was created using voice recognition software. I have worked with consultants from the vendor as well as technical experts from [facility]  [employer] to optimize the interface. I have made every reasonable attempt to correct obvious errors, but I expect that there are errors of grammar and possibly content I did not discover before finalizing the note.

## 2021-12-08 ENCOUNTER — OFFICE VISIT (OUTPATIENT)
Dept: SLEEP MEDICINE | Facility: MEDICAL CENTER | Age: 61
End: 2021-12-08
Payer: COMMERCIAL

## 2021-12-08 VITALS
RESPIRATION RATE: 14 BRPM | TEMPERATURE: 97.5 F | DIASTOLIC BLOOD PRESSURE: 64 MMHG | HEART RATE: 77 BPM | HEIGHT: 62 IN | WEIGHT: 142 LBS | BODY MASS INDEX: 26.13 KG/M2 | SYSTOLIC BLOOD PRESSURE: 100 MMHG | OXYGEN SATURATION: 98 %

## 2021-12-08 DIAGNOSIS — Z91.09 ENVIRONMENTAL ALLERGIES: ICD-10-CM

## 2021-12-08 DIAGNOSIS — J47.9 BRONCHIECTASIS WITHOUT COMPLICATION (HCC): ICD-10-CM

## 2021-12-08 DIAGNOSIS — Z86.11 HISTORY OF TB (TUBERCULOSIS): ICD-10-CM

## 2021-12-08 DIAGNOSIS — J45.20 MILD INTERMITTENT ASTHMA WITHOUT COMPLICATION: ICD-10-CM

## 2021-12-08 DIAGNOSIS — J45.30 MILD PERSISTENT ASTHMA WITHOUT COMPLICATION: ICD-10-CM

## 2021-12-08 PROCEDURE — 99214 OFFICE O/P EST MOD 30 MIN: CPT | Performed by: INTERNAL MEDICINE

## 2021-12-08 RX ORDER — ALBUTEROL SULFATE 90 UG/1
2 AEROSOL, METERED RESPIRATORY (INHALATION) EVERY 6 HOURS PRN
Qty: 3 EACH | Refills: 4 | Status: SHIPPED | OUTPATIENT
Start: 2021-12-08 | End: 2023-04-04 | Stop reason: SDUPTHER

## 2021-12-08 ASSESSMENT — PAIN SCALES - GENERAL: PAINLEVEL: NO PAIN

## 2021-12-08 ASSESSMENT — FIBROSIS 4 INDEX: FIB4 SCORE: 0.94

## 2021-12-08 NOTE — PROGRESS NOTES
Chief Complaint   Patient presents with   • Follow-Up     Last Seen 11/2/20   • Asthma     Labs 9/25/21       HPI:  The patient is a 61-year-old woman with a history of chronic asthma that has been well controlled.  She also has a history of remote tuberculosis that was treated with 1 year of medications.  She was living in [place] at that time.  She was treated by  with immunotherapy for about 3 years which helped to improve her symptoms.  She stopped her immunotherapy during the Covid epidemic.  She remains in good control.  She had previously been on Symbicort and ProAir.  Most recently she has been only taking the ProAir twice a day and still has not had any worsening of her asthma.  She does have a mild chronic cough but that has not worsened.  Her chest CT does show some minimal bronchiectasis and some chronic pleural thickening on the right side.  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    Past Medical History:   Diagnosis Date   • Bronchiectasis (HCC)    • Bronchitis    • Environmental allergies 7/3/2013   • Hepatitis B carrier (HCC)    • History of pneumothorax    • Hypertension    • Pneumonia    • Positive TB test     treated 1990's   • Tuberculosis    • Tuberculosis exposure        ROS:   Constitutional: Denies fevers, chills, night sweats, fatigue or weight loss  Eyes: Denies vision loss, pain, drainage, double vision  Ears, Nose, Throat: Denies earache, tinnitus, hoarseness  Cardiovascular: Denies chest pain, tightness, palpitations  Respiratory: Denies shortness of breath, sputum production, cough, hemoptysis at this time  Sleep: Denies, snoring, apnea  GI: Denies abdominal pain, nausea, vomiting, diarrhea  : Denies frequent urination, hematuria, painful urination  Musculoskeletal: Denies back pain, painful joints, sore muscles  Neurological: Denies headaches, seizures  Skin: Denies rashes, color changes  Psychiatric: Denies depression or thoughts of suicide  Hematologic: Denies bleeding  tendency or clotting tendency  Allergic/Immunologic: Denies rhinitis, skin sensitivity    Social History     Socioeconomic History   • Marital status:      Spouse name: Not on file   • Number of children: 3   • Years of education: Not on file   • Highest education level: Not on file   Occupational History   • Occupation: nutrition services   Tobacco Use   • Smoking status: Never Smoker   • Smokeless tobacco: Never Used   Vaping Use   • Vaping Use: Never used   Substance and Sexual Activity   • Alcohol use: Not Currently     Alcohol/week: 0.0 oz   • Drug use: No   • Sexual activity: Yes     Partners: Male   Other Topics Concern   • Not on file   Social History Narrative   • Not on file     Social Determinants of Health     Financial Resource Strain:    • Difficulty of Paying Living Expenses: Not on file   Food Insecurity:    • Worried About Running Out of Food in the Last Year: Not on file   • Ran Out of Food in the Last Year: Not on file   Transportation Needs:    • Lack of Transportation (Medical): Not on file   • Lack of Transportation (Non-Medical): Not on file   Physical Activity:    • Days of Exercise per Week: Not on file   • Minutes of Exercise per Session: Not on file   Stress:    • Feeling of Stress : Not on file   Social Connections:    • Frequency of Communication with Friends and Family: Not on file   • Frequency of Social Gatherings with Friends and Family: Not on file   • Attends Zoroastrianism Services: Not on file   • Active Member of Clubs or Organizations: Not on file   • Attends Club or Organization Meetings: Not on file   • Marital Status: Not on file   Intimate Partner Violence:    • Fear of Current or Ex-Partner: Not on file   • Emotionally Abused: Not on file   • Physically Abused: Not on file   • Sexually Abused: Not on file   Housing Stability:    • Unable to Pay for Housing in the Last Year: Not on file   • Number of Places Lived in the Last Year: Not on file   • Unstable Housing in the  "Last Year: Not on file     Pollen extract  Current Outpatient Medications on File Prior to Visit   Medication Sig Dispense Refill   • losartan (COZAAR) 50 MG Tab TAKE 1/2 TABLET BY MOUTH IN THE MORNING THEN TAKE 1 TABLET IN THE EVENING 135 Tablet 1   • SYMBICORT 80-4.5 MCG/ACT Aerosol INHALE 1 TO 2 PUFFS BY MOUTH 2 TIMES A DAY. RINSE MOUTH WITH WATER AFTER EACH USE  4   • albuterol 108 (90 Base) MCG/ACT Aero Soln inhalation aerosol Inhale 2 Puffs by mouth every 6 hours as needed for Shortness of Breath. 1 Inhaler 2   • Omega-3 Fatty Acids (FISH OIL) 1000 MG Cap capsule Take 1,000 mg by mouth every day.     • Cholecalciferol (VITAMIN D) 2000 units Cap Take 2,000 Units by mouth every day.     • acetaminophen (TYLENOL) 500 MG Tab Take 1,000 mg by mouth every 8 hours as needed.     • Multiple Vitamin (MULTIVITAMIN) capsule Take 1 Cap by mouth every day. 100 Cap 0   • aspirin (ASA) 81 MG CHEW chewable tablet Take 1 Tab by mouth every day. 100 Tab 11   • loratadine (CLARITIN) 10 MG TABS Take 10 mg by mouth every day.     • diclofenac DR (VOLTAREN) 75 MG Tablet Delayed Response Take 75 mg by mouth 2 times a day. (Patient not taking: Reported on 10/28/2021)     • fluticasone (FLONASE) 50 MCG/ACT nasal spray Spray 1 Spray in nose 2 times a day. (Patient not taking: Reported on 10/28/2021) 1 Bottle 3     No current facility-administered medications on file prior to visit.     /64 (BP Location: Right arm, Patient Position: Sitting, BP Cuff Size: Adult)   Pulse 77   Temp 36.4 °C (97.5 °F) (Temporal)   Resp 14   Ht 1.575 m (5' 2\")   Wt 64.4 kg (142 lb)   SpO2 98%   Family History   Problem Relation Age of Onset   • Heart Disease Mother    • Diabetes Mother    • Heart Disease Father    • Hypertension Sister    • Other Sister         1 sister - kidney transplant   • Diabetes Sister    • Diabetes Brother         1 brother   • Drug abuse Brother    • Kidney Disease Brother        Physical Exam:    HEENT: PERRLA, EOMI, no " scleral icterus, no nasal or oral lesions  Neck: No thyromegaly, no adenopathy, no bruits  Mallampatti: Grade II  Lungs: Equal breath sounds, no wheezes or crackles  Heart: Regular rate and rhythm, no gallops or murmurs  Abdomen: Soft, benign, no organomegaly  Extremities: No clubbing, cyanosis, or edema  Neurologic: Cranial nerve, motor, and sensory exam are normal    1. Mild persistent asthma without complication    2. Environmental allergies    3. Bronchiectasis without complication (HCC)    4. History of TB (tuberculosis)        Her asthma seems well controlled with use of albuterol 2 times per day on a regular basis.  She has not been using her Symbicort.  She will continue her current regimen.  If she does have any worsening of her shortness of breath or cough then we may want to reintroduce anti-inflammatory therapy with Symbicort.  We will see her back in 1 year or sooner if she has issues.

## 2021-12-20 RX ORDER — DILTIAZEM HYDROCHLORIDE 60 MG/1
TABLET, FILM COATED ORAL
Qty: 1 EACH | Refills: 4 | Status: SHIPPED | OUTPATIENT
Start: 2021-12-20 | End: 2022-04-11 | Stop reason: SDUPTHER

## 2021-12-21 ENCOUNTER — HOSPITAL ENCOUNTER (OUTPATIENT)
Dept: RADIOLOGY | Facility: MEDICAL CENTER | Age: 61
End: 2021-12-21
Attending: FAMILY MEDICINE
Payer: COMMERCIAL

## 2021-12-21 DIAGNOSIS — Z12.31 VISIT FOR SCREENING MAMMOGRAM: ICD-10-CM

## 2021-12-21 PROCEDURE — 77063 BREAST TOMOSYNTHESIS BI: CPT

## 2022-01-12 ENCOUNTER — HOSPITAL ENCOUNTER (OUTPATIENT)
Dept: LAB | Facility: MEDICAL CENTER | Age: 62
End: 2022-01-12
Attending: PHYSICIAN ASSISTANT
Payer: COMMERCIAL

## 2022-01-12 LAB
ALBUMIN SERPL BCP-MCNC: 4.7 G/DL (ref 3.2–4.9)
ALP SERPL-CCNC: 74 U/L (ref 30–99)
ALT SERPL-CCNC: 34 U/L (ref 2–50)
AST SERPL-CCNC: 27 U/L (ref 12–45)
BILIRUB CONJ SERPL-MCNC: <0.2 MG/DL (ref 0.1–0.5)
BILIRUB INDIRECT SERPL-MCNC: NORMAL MG/DL (ref 0–1)
BILIRUB SERPL-MCNC: 0.2 MG/DL (ref 0.1–1.5)
HBV CORE AB SERPL QL IA: REACTIVE
HBV SURFACE AB SERPL IA-ACNC: 923 MIU/ML (ref 0–10)
HBV SURFACE AG SER QL: NORMAL
PROT SERPL-MCNC: 7.7 G/DL (ref 6–8.2)

## 2022-01-12 PROCEDURE — 87517 HEPATITIS B DNA QUANT: CPT

## 2022-01-12 PROCEDURE — 80076 HEPATIC FUNCTION PANEL: CPT

## 2022-01-12 PROCEDURE — 36415 COLL VENOUS BLD VENIPUNCTURE: CPT

## 2022-01-12 PROCEDURE — 82107 ALPHA-FETOPROTEIN L3: CPT

## 2022-01-12 PROCEDURE — 86704 HEP B CORE ANTIBODY TOTAL: CPT

## 2022-01-12 PROCEDURE — 87340 HEPATITIS B SURFACE AG IA: CPT

## 2022-01-12 PROCEDURE — 86706 HEP B SURFACE ANTIBODY: CPT

## 2022-01-26 LAB
AFP L3 MFR SERPL: <0.5 % (ref 0–9.9)
AFP SERPL-MCNC: 3 NG/ML (ref 0–15)

## 2022-03-29 ENCOUNTER — HOSPITAL ENCOUNTER (OUTPATIENT)
Dept: RADIOLOGY | Facility: MEDICAL CENTER | Age: 62
End: 2022-03-29
Attending: PHYSICIAN ASSISTANT
Payer: COMMERCIAL

## 2022-03-29 DIAGNOSIS — B18.1 HEPATITIS B CARRIER (HCC): ICD-10-CM

## 2022-03-29 DIAGNOSIS — Z12.11 SPECIAL SCREENING FOR MALIGNANT NEOPLASMS, COLON: ICD-10-CM

## 2022-03-29 PROCEDURE — 76705 ECHO EXAM OF ABDOMEN: CPT

## 2022-04-09 ENCOUNTER — PATIENT MESSAGE (OUTPATIENT)
Dept: SLEEP MEDICINE | Facility: MEDICAL CENTER | Age: 62
End: 2022-04-09
Payer: COMMERCIAL

## 2022-04-11 RX ORDER — DILTIAZEM HYDROCHLORIDE 60 MG/1
TABLET, FILM COATED ORAL
Qty: 1 EACH | Refills: 4 | Status: SHIPPED | OUTPATIENT
Start: 2022-04-11 | End: 2023-04-04 | Stop reason: SDUPTHER

## 2022-04-22 ENCOUNTER — HOSPITAL ENCOUNTER (OUTPATIENT)
Dept: LAB | Facility: MEDICAL CENTER | Age: 62
End: 2022-04-22
Attending: FAMILY MEDICINE
Payer: COMMERCIAL

## 2022-04-22 DIAGNOSIS — J45.20 MILD INTERMITTENT ASTHMA WITHOUT COMPLICATION: ICD-10-CM

## 2022-04-22 DIAGNOSIS — E78.5 DYSLIPIDEMIA: ICD-10-CM

## 2022-04-22 DIAGNOSIS — I10 ESSENTIAL HYPERTENSION: ICD-10-CM

## 2022-04-22 LAB
ALBUMIN SERPL BCP-MCNC: 4.5 G/DL (ref 3.2–4.9)
ALBUMIN/GLOB SERPL: 1.7 G/DL
ALP SERPL-CCNC: 66 U/L (ref 30–99)
ALT SERPL-CCNC: 20 U/L (ref 2–50)
ANION GAP SERPL CALC-SCNC: 10 MMOL/L (ref 7–16)
AST SERPL-CCNC: 26 U/L (ref 12–45)
BASOPHILS # BLD AUTO: 1.4 % (ref 0–1.8)
BASOPHILS # BLD: 0.1 K/UL (ref 0–0.12)
BILIRUB SERPL-MCNC: 0.5 MG/DL (ref 0.1–1.5)
BUN SERPL-MCNC: 17 MG/DL (ref 8–22)
CALCIUM SERPL-MCNC: 9.4 MG/DL (ref 8.5–10.5)
CHLORIDE SERPL-SCNC: 107 MMOL/L (ref 96–112)
CHOLEST SERPL-MCNC: 165 MG/DL (ref 100–199)
CO2 SERPL-SCNC: 25 MMOL/L (ref 20–33)
CREAT SERPL-MCNC: 0.66 MG/DL (ref 0.5–1.4)
EOSINOPHIL # BLD AUTO: 0.55 K/UL (ref 0–0.51)
EOSINOPHIL NFR BLD: 7.5 % (ref 0–6.9)
ERYTHROCYTE [DISTWIDTH] IN BLOOD BY AUTOMATED COUNT: 41.6 FL (ref 35.9–50)
FASTING STATUS PATIENT QL REPORTED: NORMAL
GFR SERPLBLD CREATININE-BSD FMLA CKD-EPI: 99 ML/MIN/1.73 M 2
GLOBULIN SER CALC-MCNC: 2.6 G/DL (ref 1.9–3.5)
GLUCOSE SERPL-MCNC: 92 MG/DL (ref 65–99)
HCT VFR BLD AUTO: 44 % (ref 37–47)
HDLC SERPL-MCNC: 76 MG/DL
HGB BLD-MCNC: 14.2 G/DL (ref 12–16)
IMM GRANULOCYTES # BLD AUTO: 0.02 K/UL (ref 0–0.11)
IMM GRANULOCYTES NFR BLD AUTO: 0.3 % (ref 0–0.9)
LDLC SERPL CALC-MCNC: 71 MG/DL
LYMPHOCYTES # BLD AUTO: 2.84 K/UL (ref 1–4.8)
LYMPHOCYTES NFR BLD: 38.5 % (ref 22–41)
MCH RBC QN AUTO: 30.3 PG (ref 27–33)
MCHC RBC AUTO-ENTMCNC: 32.3 G/DL (ref 33.6–35)
MCV RBC AUTO: 94 FL (ref 81.4–97.8)
MONOCYTES # BLD AUTO: 0.54 K/UL (ref 0–0.85)
MONOCYTES NFR BLD AUTO: 7.3 % (ref 0–13.4)
NEUTROPHILS # BLD AUTO: 3.32 K/UL (ref 2–7.15)
NEUTROPHILS NFR BLD: 45 % (ref 44–72)
NRBC # BLD AUTO: 0 K/UL
NRBC BLD-RTO: 0 /100 WBC
PLATELET # BLD AUTO: 292 K/UL (ref 164–446)
PMV BLD AUTO: 9.8 FL (ref 9–12.9)
POTASSIUM SERPL-SCNC: 4.3 MMOL/L (ref 3.6–5.5)
PROT SERPL-MCNC: 7.1 G/DL (ref 6–8.2)
RBC # BLD AUTO: 4.68 M/UL (ref 4.2–5.4)
SODIUM SERPL-SCNC: 142 MMOL/L (ref 135–145)
TRIGL SERPL-MCNC: 92 MG/DL (ref 0–149)
WBC # BLD AUTO: 7.4 K/UL (ref 4.8–10.8)

## 2022-04-22 PROCEDURE — 80061 LIPID PANEL: CPT

## 2022-04-22 PROCEDURE — 85025 COMPLETE CBC W/AUTO DIFF WBC: CPT

## 2022-04-22 PROCEDURE — 36415 COLL VENOUS BLD VENIPUNCTURE: CPT

## 2022-04-22 PROCEDURE — 80053 COMPREHEN METABOLIC PANEL: CPT

## 2022-04-28 ENCOUNTER — OFFICE VISIT (OUTPATIENT)
Dept: MEDICAL GROUP | Facility: PHYSICIAN GROUP | Age: 62
End: 2022-04-28
Payer: COMMERCIAL

## 2022-04-28 VITALS
HEIGHT: 62 IN | RESPIRATION RATE: 16 BRPM | SYSTOLIC BLOOD PRESSURE: 130 MMHG | WEIGHT: 148.59 LBS | OXYGEN SATURATION: 96 % | TEMPERATURE: 98.5 F | BODY MASS INDEX: 27.34 KG/M2 | DIASTOLIC BLOOD PRESSURE: 72 MMHG | HEART RATE: 82 BPM

## 2022-04-28 DIAGNOSIS — J06.9 ACUTE URI: ICD-10-CM

## 2022-04-28 DIAGNOSIS — J45.20 MILD INTERMITTENT ASTHMA WITHOUT COMPLICATION: ICD-10-CM

## 2022-04-28 DIAGNOSIS — E78.5 DYSLIPIDEMIA: ICD-10-CM

## 2022-04-28 DIAGNOSIS — B18.1 HEPATITIS B CARRIER (HCC): ICD-10-CM

## 2022-04-28 DIAGNOSIS — I10 ESSENTIAL HYPERTENSION: ICD-10-CM

## 2022-04-28 PROCEDURE — 99214 OFFICE O/P EST MOD 30 MIN: CPT | Performed by: FAMILY MEDICINE

## 2022-04-28 ASSESSMENT — FIBROSIS 4 INDEX: FIB4 SCORE: 1.23

## 2022-04-28 NOTE — PROGRESS NOTES
"Subjective     Emily Sanchze is a 62 y.o. female who presents with Follow-Up            HPI     Patient returns for follow-up of her medical problems.    In terms of her hypertension, this is under control on losartan.    She continues to manage her dyslipidemia with her own efforts only.    For mild intermittent asthma without complication, she continues to follow-up with her pulmonary specialist with the last visit in November 2021.  She could not get the brand Symbicort inhaler because of cost and so her pulmonary specialist changed to the generic version last week and she is waiting for the pharmacy for its availability.  She uses the albuterol inhaler rarely.  She has been coughing in the last few days with clear phlegm.  Denies any fever, chills, shortness of breath.  She said it started after she was working in the yard.  She does not feel rundown or fatigued.    She follows up with the GI specialist for her hepatitis B carrier status.  Her most recent blood work came back positive hepatitis B core antibody and hepatitis B surface antibody and negative hepatitis B surface antigen.  Her hepatitis B virus PCR came back undetectable.  Alpha-fetoprotein within normal range.  Most recent ultrasound in March did not show any mass in the liver.    Past medical history, past surgical history, family history reviewed-no changes    Social history reviewed-no changes    Allergies reviewed-no changes    Medications reviewed-no changes    ROS     As per HPI, the rest are negative.           Objective     /72 (BP Location: Left arm, Patient Position: Sitting, BP Cuff Size: Adult)   Pulse 82   Temp 36.9 °C (98.5 °F) (Temporal)   Resp 16   Ht 1.575 m (5' 2\")   Wt 67.4 kg (148 lb 9.4 oz)   SpO2 96%   BMI 27.18 kg/m²      Physical Exam     Examined alert, awake, oriented, not in distress    Neck-supple, no lymphadenopathy, no thyromegaly  Lungs-clear to auscultation, no rales, no wheezes  Heart-regular " rate and rhythm, no murmur  Extremities-no edema, clubbing, cyanosis             Hospital Outpatient Visit on 04/22/2022   Component Date Value Ref Range Status   • WBC 04/22/2022 7.4  4.8 - 10.8 K/uL Final   • RBC 04/22/2022 4.68  4.20 - 5.40 M/uL Final   • Hemoglobin 04/22/2022 14.2  12.0 - 16.0 g/dL Final   • Hematocrit 04/22/2022 44.0  37.0 - 47.0 % Final   • MCV 04/22/2022 94.0  81.4 - 97.8 fL Final   • MCH 04/22/2022 30.3  27.0 - 33.0 pg Final   • MCHC 04/22/2022 32.3 (A) 33.6 - 35.0 g/dL Final   • RDW 04/22/2022 41.6  35.9 - 50.0 fL Final   • Platelet Count 04/22/2022 292  164 - 446 K/uL Final   • MPV 04/22/2022 9.8  9.0 - 12.9 fL Final   • Neutrophils-Polys 04/22/2022 45.00  44.00 - 72.00 % Final   • Lymphocytes 04/22/2022 38.50  22.00 - 41.00 % Final   • Monocytes 04/22/2022 7.30  0.00 - 13.40 % Final   • Eosinophils 04/22/2022 7.50 (A) 0.00 - 6.90 % Final   • Basophils 04/22/2022 1.40  0.00 - 1.80 % Final   • Immature Granulocytes 04/22/2022 0.30  0.00 - 0.90 % Final   • Nucleated RBC 04/22/2022 0.00  /100 WBC Final   • Neutrophils (Absolute) 04/22/2022 3.32  2.00 - 7.15 K/uL Final    Includes immature neutrophils, if present.   • Lymphs (Absolute) 04/22/2022 2.84  1.00 - 4.80 K/uL Final   • Monos (Absolute) 04/22/2022 0.54  0.00 - 0.85 K/uL Final   • Eos (Absolute) 04/22/2022 0.55 (A) 0.00 - 0.51 K/uL Final   • Baso (Absolute) 04/22/2022 0.10  0.00 - 0.12 K/uL Final   • Immature Granulocytes (abs) 04/22/2022 0.02  0.00 - 0.11 K/uL Final   • NRBC (Absolute) 04/22/2022 0.00  K/uL Final   • Sodium 04/22/2022 142  135 - 145 mmol/L Final   • Potassium 04/22/2022 4.3  3.6 - 5.5 mmol/L Final   • Chloride 04/22/2022 107  96 - 112 mmol/L Final   • Co2 04/22/2022 25  20 - 33 mmol/L Final   • Anion Gap 04/22/2022 10.0  7.0 - 16.0 Final   • Glucose 04/22/2022 92  65 - 99 mg/dL Final   • Bun 04/22/2022 17  8 - 22 mg/dL Final   • Creatinine 04/22/2022 0.66  0.50 - 1.40 mg/dL Final   • Calcium 04/22/2022 9.4  8.5 - 10.5  mg/dL Final   • AST(SGOT) 04/22/2022 26  12 - 45 U/L Final   • ALT(SGPT) 04/22/2022 20  2 - 50 U/L Final   • Alkaline Phosphatase 04/22/2022 66  30 - 99 U/L Final   • Total Bilirubin 04/22/2022 0.5  0.1 - 1.5 mg/dL Final   • Albumin 04/22/2022 4.5  3.2 - 4.9 g/dL Final   • Total Protein 04/22/2022 7.1  6.0 - 8.2 g/dL Final   • Globulin 04/22/2022 2.6  1.9 - 3.5 g/dL Final   • A-G Ratio 04/22/2022 1.7  g/dL Final   • Cholesterol,Tot 04/22/2022 165  100 - 199 mg/dL Final   • Triglycerides 04/22/2022 92  0 - 149 mg/dL Final   • HDL 04/22/2022 76  >=40 mg/dL Final   • LDL 04/22/2022 71  <100 mg/dL Final   • Fasting Status 04/22/2022 Fasting   Final   • GFR (CKD-EPI) 04/22/2022 99  >60 mL/min/1.73 m 2 Final    Comment: Effective 3/15/2022, estimated Glomerular Filtration Rate  is calculated using race neutral CKD-EPI 2021 equation  per [facility]AS[facility] recommendations.                    Assessment & Plan        1. Essential hypertension  Controlled on her medication.  - Lipid Profile; Future  - Comp Metabolic Panel; Future    2. Dyslipidemia  All at goal with her own efforts only.  - Lipid Profile; Future  - Comp Metabolic Panel; Future    3. Mild intermittent asthma without complication  Currently compensated.  She is waiting to get the generic version of Symbicort because of the cost.  Hopefully this is available.  She has the albuterol inhaler for as needed use.  - Lipid Profile; Future  - Comp Metabolic Panel; Future    4. Hepatitis B carrier (HCC)  No evidence of active infection.  She is under surveillance by the GI specialist with most recent ultrasound no evidence of liver mass and alpha-fetoprotein within normal limits.  - Lipid Profile; Future  - Comp Metabolic Panel; Future    5. Acute URI  Her cough is most likely viral illness versus allergy.  Normal lung exam.  She is not hypoxic.  If she develops colored phlegm, fever, chills, feeling rundown, shortness of breath, she will let me know.  For now we will do  supportive measures.       Please note that this dictation was created using voice recognition software. I have worked with consultants from the vendor as well as technical experts from [facility] to optimize the interface. I have made every reasonable attempt to correct obvious errors, but I expect that there are errors of grammar and possibly content I did not discover before finalizing the note.

## 2022-09-29 ENCOUNTER — HOSPITAL ENCOUNTER (OUTPATIENT)
Facility: MEDICAL CENTER | Age: 62
End: 2022-09-29
Attending: CLINICAL NURSE SPECIALIST
Payer: COMMERCIAL

## 2022-09-29 ENCOUNTER — OFFICE VISIT (OUTPATIENT)
Dept: MEDICAL GROUP | Facility: IMAGING CENTER | Age: 62
End: 2022-09-29
Payer: COMMERCIAL

## 2022-09-29 VITALS
SYSTOLIC BLOOD PRESSURE: 124 MMHG | TEMPERATURE: 97.1 F | BODY MASS INDEX: 26.98 KG/M2 | HEIGHT: 62 IN | OXYGEN SATURATION: 98 % | DIASTOLIC BLOOD PRESSURE: 80 MMHG | WEIGHT: 146.6 LBS | HEART RATE: 86 BPM

## 2022-09-29 DIAGNOSIS — I10 PRIMARY HYPERTENSION: ICD-10-CM

## 2022-09-29 DIAGNOSIS — N89.8 VAGINAL IRRITATION: ICD-10-CM

## 2022-09-29 DIAGNOSIS — Z23 NEED FOR VACCINATION: ICD-10-CM

## 2022-09-29 DIAGNOSIS — R10.2 PELVIC PAIN: ICD-10-CM

## 2022-09-29 DIAGNOSIS — Z76.89 ENCOUNTER TO ESTABLISH CARE WITH NEW DOCTOR: ICD-10-CM

## 2022-09-29 LAB
APPEARANCE UR: CLEAR
BILIRUB UR STRIP-MCNC: NEGATIVE MG/DL
COLOR UR AUTO: YELLOW
GLUCOSE UR STRIP.AUTO-MCNC: NEGATIVE MG/DL
KETONES UR STRIP.AUTO-MCNC: NEGATIVE MG/DL
LEUKOCYTE ESTERASE UR QL STRIP.AUTO: NEGATIVE
NITRITE UR QL STRIP.AUTO: NEGATIVE
PH UR STRIP.AUTO: 7 [PH] (ref 5–8)
PROT UR QL STRIP: NEGATIVE MG/DL
RBC UR QL AUTO: NEGATIVE
SP GR UR STRIP.AUTO: 1.01
UROBILINOGEN UR STRIP-MCNC: 0.2 MG/DL

## 2022-09-29 PROCEDURE — 90686 IIV4 VACC NO PRSV 0.5 ML IM: CPT | Performed by: CLINICAL NURSE SPECIALIST

## 2022-09-29 PROCEDURE — 87660 TRICHOMONAS VAGIN DIR PROBE: CPT

## 2022-09-29 PROCEDURE — 99214 OFFICE O/P EST MOD 30 MIN: CPT | Mod: 25 | Performed by: CLINICAL NURSE SPECIALIST

## 2022-09-29 PROCEDURE — 87510 GARDNER VAG DNA DIR PROBE: CPT

## 2022-09-29 PROCEDURE — 87480 CANDIDA DNA DIR PROBE: CPT

## 2022-09-29 PROCEDURE — 90471 IMMUNIZATION ADMIN: CPT | Performed by: CLINICAL NURSE SPECIALIST

## 2022-09-29 PROCEDURE — 81002 URINALYSIS NONAUTO W/O SCOPE: CPT | Performed by: CLINICAL NURSE SPECIALIST

## 2022-09-29 RX ORDER — MELOXICAM 7.5 MG/1
TABLET ORAL
COMMUNITY
Start: 2022-09-28 | End: 2023-04-04

## 2022-09-29 ASSESSMENT — FIBROSIS 4 INDEX: FIB4 SCORE: 1.23

## 2022-09-29 ASSESSMENT — PAIN SCALES - GENERAL: PAINLEVEL: NO PAIN

## 2022-09-29 ASSESSMENT — PATIENT HEALTH QUESTIONNAIRE - PHQ9: CLINICAL INTERPRETATION OF PHQ2 SCORE: 0

## 2022-09-29 NOTE — ASSESSMENT & PLAN NOTE
Pelvic pain for 2-3 weeks bilaterally constant, worse when lifting heavy objects.  Most pain over bladder 4/10 aching and some burning.  No pain with urination. No back pain. No foul odor or blood in urine.  No abnormal vaginal discharge. She has not tried any interventions.

## 2022-09-29 NOTE — PROGRESS NOTES
Subjective     Emily Sanchez is a 62 y.o. female who presents with Establish Care and Pelvic Pain (When carrying heavy items [id number] )            HPI  Pelvic pain  Pelvic pain for 2-3 weeks bilaterally constant, worse when lifting heavy objects.  Most pain over bladder 4/10 aching and some burning.  No pain with urination. No back pain. No foul odor or blood in urine.  No abnormal vaginal discharge. She has not tried any interventions.     Hypertension  Takes losartan 50mg.    ROS  See HPI     Allergies   Allergen Reactions    Penicillins Rash     On body     Pollen Extract Runny Nose     Runny Nose       Current Outpatient Medications on File Prior to Visit   Medication Sig Dispense Refill    losartan (COZAAR) 50 MG Tab TAKE 1/2 TABLET BY MOUTH IN THE MORNING THEN TAKE 1 TABLET IN THE EVENING 135 Tablet 3    SYMBICORT 80-4.5 MCG/ACT Aerosol INHALE 1 TO 2 PUFFS BY MOUTH 2 TIMES A DAY. RINSE MOUTH WITH WATER AFTER EACH USE 1 Each 4    albuterol 108 (90 Base) MCG/ACT Aero Soln inhalation aerosol Inhale 2 Puffs every 6 hours as needed for Shortness of Breath. 3 Each 4    Omega-3 Fatty Acids (FISH OIL) 1000 MG Cap capsule Take 1,000 mg by mouth every day.      Cholecalciferol (VITAMIN D) 2000 units Cap Take 2,000 Units by mouth every day.      acetaminophen (TYLENOL) 500 MG Tab Take 1,000 mg by mouth every 8 hours as needed.      Multiple Vitamin (MULTIVITAMIN) capsule Take 1 Cap by mouth every day. 100 Cap 0    aspirin (ASA) 81 MG CHEW chewable tablet Take 1 Tab by mouth every day. 100 Tab 11    loratadine (CLARITIN) 10 MG TABS Take 10 mg by mouth every day.      meloxicam (MOBIC) 7.5 MG Tab       fluticasone (FLONASE) 50 MCG/ACT nasal spray Spray 1 Spray in nose 2 times a day. (Patient not taking: No sig reported) 1 Bottle 3     No current facility-administered medications on file prior to visit.           Objective     /80 (BP Location: Left arm, Patient Position: Sitting, BP Cuff Size: Adult)    "Pulse 86   Temp 36.2 °C (97.1 °F) (Temporal)   Ht 1.575 m (5' 2\")   Wt 66.5 kg (146 lb 9.6 oz)   SpO2 98%   BMI 26.81 kg/m²      Physical Exam  Constitutional:       General: She is not in acute distress.     Appearance: Normal appearance. She is not ill-appearing, toxic-appearing or diaphoretic.   HENT:      Head: Normocephalic and atraumatic.   Eyes:      General: No scleral icterus.     Pupils: Pupils are equal, round, and reactive to light.   Cardiovascular:      Rate and Rhythm: Normal rate.   Pulmonary:      Effort: Pulmonary effort is normal.   Genitourinary:     Comments: Vaginal os with swelling. Vaginal canal with edema and petechiae, similar to strawberry cervix but in vaginal canal. Cervix not seen as speculum exam painful and slight opening of speculum difficult to tolerate.  Bimanual without abdominal tenderness but with extra tissue just past cervical os.    Skin:     General: Skin is warm and dry.   Neurological:      Mental Status: She is alert and oriented to person, place, and time.      Gait: Gait normal.   Psychiatric:         Mood and Affect: Mood normal.         Behavior: Behavior normal.         Thought Content: Thought content normal.         Judgment: Judgment normal.       7/9/2019 CXR   IMPRESSION:     RIGHT pleural scar with overlying atelectasis and scar, not demonstrably changed                   Assessment & Plan      1. Encounter to establish care with new doctor      2. Pelvic pain  Pelvic pain for 2 weeks. Vaginal os with swelling possibly related to prolapse. Vaginal canal with edema and petechiae, similar to strawberry cervix but in vaginal canal. Cervix not seen as speculum exam painful and slight opening of speculum difficult to tolerate.  Bimanual without abdominal tenderness but with extra tissue just past cervical os.   Referral to gynecology placed and vaginal pathogens sample obtained and sent.     - POCT Urinalysis-Negative  - Referral to Gynecology    3. Primary " hypertension  Controlled    4. Need for vaccination    - INFLUENZA VACCINE QUAD INJ (PF)    5. Vaginal irritation  See #1  - Referral to Gynecology  - VAGINAL PATHOGENS DNA PANEL; Future            Return if symptoms worsen or fail to improve.

## 2022-09-30 DIAGNOSIS — N89.8 VAGINAL IRRITATION: ICD-10-CM

## 2022-09-30 LAB
CANDIDA DNA VAG QL PROBE+SIG AMP: NEGATIVE
G VAGINALIS DNA VAG QL PROBE+SIG AMP: NEGATIVE
T VAGINALIS DNA VAG QL PROBE+SIG AMP: NEGATIVE

## 2022-10-03 ENCOUNTER — OFFICE VISIT (OUTPATIENT)
Dept: URGENT CARE | Facility: CLINIC | Age: 62
End: 2022-10-03
Payer: COMMERCIAL

## 2022-10-03 VITALS
HEART RATE: 90 BPM | OXYGEN SATURATION: 93 % | HEIGHT: 62 IN | DIASTOLIC BLOOD PRESSURE: 70 MMHG | RESPIRATION RATE: 20 BRPM | TEMPERATURE: 97.2 F | BODY MASS INDEX: 26.68 KG/M2 | WEIGHT: 145 LBS | SYSTOLIC BLOOD PRESSURE: 124 MMHG

## 2022-10-03 DIAGNOSIS — J22 LRTI (LOWER RESPIRATORY TRACT INFECTION): ICD-10-CM

## 2022-10-03 DIAGNOSIS — J45.31 MILD PERSISTENT ASTHMA WITH ACUTE EXACERBATION: ICD-10-CM

## 2022-10-03 PROCEDURE — 99214 OFFICE O/P EST MOD 30 MIN: CPT | Performed by: NURSE PRACTITIONER

## 2022-10-03 RX ORDER — METHYLPREDNISOLONE 4 MG/1
TABLET ORAL
Qty: 21 TABLET | Refills: 0 | Status: SHIPPED | OUTPATIENT
Start: 2022-10-03 | End: 2023-04-04

## 2022-10-03 RX ORDER — AZITHROMYCIN 250 MG/1
TABLET, FILM COATED ORAL
Qty: 6 TABLET | Refills: 0 | Status: SHIPPED | OUTPATIENT
Start: 2022-10-03 | End: 2023-04-04

## 2022-10-03 ASSESSMENT — FIBROSIS 4 INDEX: FIB4 SCORE: 1.23

## 2022-10-03 NOTE — PROGRESS NOTES
Emily Sanchez is a 62 y.o. female who presents for Cough (X1wk, cough, chest congestion, concerned about pneumonia)      HPI This is a new problem. Emily Sanchez is a 62 y.o. patient who presents to urgent care with c/o: coughing for 4 days now. Hx of asthma. Taking coricidin HB for her cough. Sneezing sometimes. + sore throat.   Has environmental allergies.  Did a home test for covid today that was negative.   Treatments tried: inhaler - albuterol - not using it very often ( once a day). Helps some.  Takes OTC antihistamine.   Denies fever,  bodyaches. Neg orthopnea, leg swelling, c/p, N/V/D   Long hx of asthma.   No other aggravating or alleviating factors.       ROSsee HPI     Allergies:       Allergies   Allergen Reactions    Penicillins Rash     On body     Pollen Extract Runny Nose     Runny Nose       PMSFS Hx:  Past Medical History:   Diagnosis Date    Bronchiectasis (HCC)     Bronchitis     Environmental allergies 7/3/2013    Hepatitis B carrier (HCC)     History of pneumothorax     Hypertension     Pneumonia     Positive TB test     treated 1990's    Tuberculosis     Tuberculosis exposure      Past Surgical History:   Procedure Laterality Date    CATARACT EXTRACTION WITH IOL      LIVER BIOPSY      TUBAL LIGATION       Family History   Problem Relation Age of Onset    Heart Disease Mother     Diabetes Mother     Heart Disease Father     Hypertension Sister     Other Sister         1 sister - kidney transplant    Diabetes Sister     Diabetes Brother         1 brother    Drug abuse Brother     Kidney Disease Brother      Social History     Tobacco Use    Smoking status: Never    Smokeless tobacco: Never   Substance Use Topics    Alcohol use: Not Currently     Alcohol/week: 0.0 oz       Problems:   Patient Active Problem List   Diagnosis    Hypertension    Hepatitis B carrier (HCC)    Environmental allergies    Shoulder pain, left    Right foot pain    Family history of diabetes mellitus  "(DM)    Abnormal CXR    Abnormal thyroid stimulating hormone (TSH) level    Abnormal CT scan of lung    Bronchiectasis without complication (HCC)    History of TB (tuberculosis)    Pelvic pain       Medications:   Current Outpatient Medications on File Prior to Visit   Medication Sig Dispense Refill    meloxicam (MOBIC) 7.5 MG Tab       losartan (COZAAR) 50 MG Tab TAKE 1/2 TABLET BY MOUTH IN THE MORNING THEN TAKE 1 TABLET IN THE EVENING 135 Tablet 3    SYMBICORT 80-4.5 MCG/ACT Aerosol INHALE 1 TO 2 PUFFS BY MOUTH 2 TIMES A DAY. RINSE MOUTH WITH WATER AFTER EACH USE 1 Each 4    albuterol 108 (90 Base) MCG/ACT Aero Soln inhalation aerosol Inhale 2 Puffs every 6 hours as needed for Shortness of Breath. 3 Each 4    Omega-3 Fatty Acids (FISH OIL) 1000 MG Cap capsule Take 1,000 mg by mouth every day.      Cholecalciferol (VITAMIN D) 2000 units Cap Take 2,000 Units by mouth every day.      acetaminophen (TYLENOL) 500 MG Tab Take 1,000 mg by mouth every 8 hours as needed.      Multiple Vitamin (MULTIVITAMIN) capsule Take 1 Cap by mouth every day. 100 Cap 0    aspirin (ASA) 81 MG CHEW chewable tablet Take 1 Tab by mouth every day. 100 Tab 11    loratadine (CLARITIN) 10 MG TABS Take 10 mg by mouth every day.      fluticasone (FLONASE) 50 MCG/ACT nasal spray Spray 1 Spray in nose 2 times a day. (Patient not taking: No sig reported) 1 Bottle 3     No current facility-administered medications on file prior to visit.          Objective:     /70 (BP Location: Left arm, Patient Position: Sitting, BP Cuff Size: Small adult)   Pulse 90   Temp 36.2 °C (97.2 °F) (Temporal)   Resp 20   Ht 1.575 m (5' 2\")   Wt 65.8 kg (145 lb)   SpO2 93%   BMI 26.52 kg/m²     Physical Exam  Vitals and nursing note reviewed.   Constitutional:       General: She is not in acute distress.     Appearance: Normal appearance. She is well-developed. She is not ill-appearing or toxic-appearing.   HENT:      Head: Normocephalic.      Right Ear: " Hearing normal. No middle ear effusion. Tympanic membrane is injected. Tympanic membrane is not erythematous.      Left Ear: Hearing normal.  No middle ear effusion. Tympanic membrane is injected. Tympanic membrane is not erythematous.      Nose: No mucosal edema or rhinorrhea.      Right Sinus: No maxillary sinus tenderness or frontal sinus tenderness.      Left Sinus: No maxillary sinus tenderness or frontal sinus tenderness.      Mouth/Throat:      Pharynx: Uvula midline. No posterior oropharyngeal erythema.      Tonsils: No tonsillar abscesses.   Neck:      Trachea: Trachea normal.   Cardiovascular:      Rate and Rhythm: Normal rate and regular rhythm.      Chest Wall: PMI is not displaced.      Pulses: Normal pulses.      Heart sounds: Normal heart sounds.   Pulmonary:      Effort: Pulmonary effort is normal. No respiratory distress.      Breath sounds: Wheezing and rhonchi present. No decreased breath sounds or rales.   Musculoskeletal:         General: Normal range of motion.      Cervical back: Full passive range of motion without pain, normal range of motion and neck supple.   Lymphadenopathy:      Cervical: No cervical adenopathy.      Upper Body:      Right upper body: No supraclavicular adenopathy.      Left upper body: No supraclavicular adenopathy.   Skin:     General: Skin is warm and dry.      Capillary Refill: Capillary refill takes less than 2 seconds.   Neurological:      Mental Status: She is alert and oriented to person, place, and time.      Gait: Gait normal.   Psychiatric:         Mood and Affect: Mood normal.         Speech: Speech normal.         Behavior: Behavior normal. Behavior is cooperative.         Assessment /Associated Orders:      1. LRTI (lower respiratory tract infection)  azithromycin (ZITHROMAX) 250 MG Tab      2. Mild persistent asthma with acute exacerbation  methylPREDNISolone (MEDROL DOSEPAK) 4 MG Tablet Therapy Pack            Medical Decision Making:    Pt is clinically  stable at today's acute urgent care visit.  No acute distress noted. Appropriate for outpatient care at this time.   Acute problem today .   Educated in proper administration of  prescription medication(s) ordered today including safety, possible SE, risks, benefits, rationale and alternatives to therapy.   Keep well hydrated  Start Medrol Kannan tomorrow. Do not take additional NSAIDS or steroids while taking RX medication. Educated on risk of lower immunity in short term, weight changes, mood changes, increased serum glucose and elevated BP while taking steroids.    Humidifier at night prn   Resume all prior  RX medications. Take as prescribed.   Declines COVID PCR test today. Advised that symptoms could be consistent with covid viral infection. Verbalized understanding.  She feels very confident in her home covid test that she already did today.    Discussed Dx, management options (risks,benefits, and alternatives to planned treatment), natural progression and supportive care.  Expressed understanding and the treatment plan was agreed upon.   Questions were encouraged and answered   Return to urgent care prn if new or worsening sx or if there is no improvement in condition prn.    Educated in Red flags and indications to immediately call 911 or present to the Emergency Department.       Time I spent evaluating Emily Sanchez in urgent care today was 34  minutes. This time includes preparing for visit, reviewing any pertinent notes or test results, counseling/education, exam, obtaining HPI, interpretation of lab tests, medication management and documentation as indicated above.Time does not include separately billable procedures noted .       Please note that this dictation was created using voice recognition software. I have worked with consultants from the vendor as well as technical experts from [employer] [employer] to optimize the interface. I have made every reasonable attempt to correct obvious errors, but I  expect that there are errors of grammar and possibly content that I did not discover before finalizing the note.  This note was electronically signed by provider

## 2022-11-03 ENCOUNTER — HOSPITAL ENCOUNTER (OUTPATIENT)
Dept: LAB | Facility: MEDICAL CENTER | Age: 62
End: 2022-11-03
Attending: FAMILY MEDICINE
Payer: COMMERCIAL

## 2022-11-03 DIAGNOSIS — I10 ESSENTIAL HYPERTENSION: ICD-10-CM

## 2022-11-03 DIAGNOSIS — B18.1 HEPATITIS B CARRIER (HCC): ICD-10-CM

## 2022-11-03 DIAGNOSIS — E78.5 DYSLIPIDEMIA: ICD-10-CM

## 2022-11-03 DIAGNOSIS — J45.20 MILD INTERMITTENT ASTHMA WITHOUT COMPLICATION: ICD-10-CM

## 2022-11-03 LAB
ALBUMIN SERPL BCP-MCNC: 4.4 G/DL (ref 3.2–4.9)
ALBUMIN/GLOB SERPL: 1.4 G/DL
ALP SERPL-CCNC: 69 U/L (ref 30–99)
ALT SERPL-CCNC: 18 U/L (ref 2–50)
ANION GAP SERPL CALC-SCNC: 9 MMOL/L (ref 7–16)
AST SERPL-CCNC: 22 U/L (ref 12–45)
BILIRUB SERPL-MCNC: 0.4 MG/DL (ref 0.1–1.5)
BUN SERPL-MCNC: 14 MG/DL (ref 8–22)
CALCIUM SERPL-MCNC: 9.3 MG/DL (ref 8.5–10.5)
CHLORIDE SERPL-SCNC: 104 MMOL/L (ref 96–112)
CHOLEST SERPL-MCNC: 197 MG/DL (ref 100–199)
CO2 SERPL-SCNC: 27 MMOL/L (ref 20–33)
CREAT SERPL-MCNC: 0.65 MG/DL (ref 0.5–1.4)
FASTING STATUS PATIENT QL REPORTED: NORMAL
GFR SERPLBLD CREATININE-BSD FMLA CKD-EPI: 99 ML/MIN/1.73 M 2
GLOBULIN SER CALC-MCNC: 3.1 G/DL (ref 1.9–3.5)
GLUCOSE SERPL-MCNC: 88 MG/DL (ref 65–99)
HDLC SERPL-MCNC: 70 MG/DL
LDLC SERPL CALC-MCNC: 106 MG/DL
POTASSIUM SERPL-SCNC: 4.7 MMOL/L (ref 3.6–5.5)
PROT SERPL-MCNC: 7.5 G/DL (ref 6–8.2)
SODIUM SERPL-SCNC: 140 MMOL/L (ref 135–145)
TRIGL SERPL-MCNC: 104 MG/DL (ref 0–149)

## 2022-11-03 PROCEDURE — 36415 COLL VENOUS BLD VENIPUNCTURE: CPT

## 2022-11-03 PROCEDURE — 80061 LIPID PANEL: CPT

## 2022-11-03 PROCEDURE — 80053 COMPREHEN METABOLIC PANEL: CPT

## 2023-04-04 ENCOUNTER — OFFICE VISIT (OUTPATIENT)
Dept: SLEEP MEDICINE | Facility: MEDICAL CENTER | Age: 63
End: 2023-04-04
Attending: INTERNAL MEDICINE
Payer: COMMERCIAL

## 2023-04-04 ENCOUNTER — HOSPITAL ENCOUNTER (OUTPATIENT)
Dept: RADIOLOGY | Facility: MEDICAL CENTER | Age: 63
End: 2023-04-04
Attending: CLINICAL NURSE SPECIALIST
Payer: COMMERCIAL

## 2023-04-04 VITALS
BODY MASS INDEX: 26.31 KG/M2 | DIASTOLIC BLOOD PRESSURE: 60 MMHG | SYSTOLIC BLOOD PRESSURE: 114 MMHG | WEIGHT: 143 LBS | OXYGEN SATURATION: 95 % | RESPIRATION RATE: 16 BRPM | HEIGHT: 62 IN | HEART RATE: 86 BPM

## 2023-04-04 DIAGNOSIS — J45.30 MILD PERSISTENT ASTHMA WITHOUT COMPLICATION: ICD-10-CM

## 2023-04-04 DIAGNOSIS — Z12.31 VISIT FOR SCREENING MAMMOGRAM: ICD-10-CM

## 2023-04-04 DIAGNOSIS — Z86.11 HISTORY OF TB (TUBERCULOSIS): ICD-10-CM

## 2023-04-04 DIAGNOSIS — J47.9 BRONCHIECTASIS WITHOUT COMPLICATION (HCC): ICD-10-CM

## 2023-04-04 PROCEDURE — 99214 OFFICE O/P EST MOD 30 MIN: CPT | Performed by: INTERNAL MEDICINE

## 2023-04-04 PROCEDURE — 99212 OFFICE O/P EST SF 10 MIN: CPT | Performed by: INTERNAL MEDICINE

## 2023-04-04 PROCEDURE — 77063 BREAST TOMOSYNTHESIS BI: CPT

## 2023-04-04 RX ORDER — ALBUTEROL SULFATE 90 UG/1
2 AEROSOL, METERED RESPIRATORY (INHALATION) EVERY 6 HOURS PRN
Qty: 3 EACH | Refills: 4 | Status: SHIPPED | OUTPATIENT
Start: 2023-04-04 | End: 2023-04-04

## 2023-04-04 RX ORDER — DILTIAZEM HYDROCHLORIDE 60 MG/1
TABLET, FILM COATED ORAL
Qty: 3 EACH | Refills: 3 | Status: SHIPPED | OUTPATIENT
Start: 2023-04-04

## 2023-04-04 RX ORDER — DILTIAZEM HYDROCHLORIDE 60 MG/1
TABLET, FILM COATED ORAL
Qty: 3 EACH | Refills: 3 | Status: SHIPPED | OUTPATIENT
Start: 2023-04-04 | End: 2023-04-04

## 2023-04-04 RX ORDER — ALBUTEROL SULFATE 90 UG/1
2 AEROSOL, METERED RESPIRATORY (INHALATION) EVERY 6 HOURS PRN
Qty: 3 EACH | Refills: 4 | Status: SHIPPED | OUTPATIENT
Start: 2023-04-04

## 2023-04-04 ASSESSMENT — ENCOUNTER SYMPTOMS
FOCAL WEAKNESS: 0
WEIGHT LOSS: 0
HEMOPTYSIS: 0
STRIDOR: 0
VOMITING: 0
DEPRESSION: 0
SPUTUM PRODUCTION: 0
DIAPHORESIS: 0
WEAKNESS: 0
ORTHOPNEA: 0
DIZZINESS: 0
WHEEZING: 0
FEVER: 0
CONSTIPATION: 0
FALLS: 0
DOUBLE VISION: 0
COUGH: 0
PALPITATIONS: 0
PND: 0
HEADACHES: 0
EYE DISCHARGE: 0
PHOTOPHOBIA: 0
EYE PAIN: 0
EYE REDNESS: 0
NAUSEA: 0
SORE THROAT: 0
BACK PAIN: 0
NECK PAIN: 0
CHILLS: 0
SINUS PAIN: 0
TREMORS: 0
MYALGIAS: 0
SHORTNESS OF BREATH: 0
CLAUDICATION: 0
HEARTBURN: 0
DIARRHEA: 0
ABDOMINAL PAIN: 0
BLURRED VISION: 0
SPEECH CHANGE: 0

## 2023-04-04 ASSESSMENT — FIBROSIS 4 INDEX: FIB4 SCORE: 1.12

## 2023-04-04 NOTE — PROGRESS NOTES
Chief Complaint   Patient presents with    Follow-Up     Last seen 12/8/21 Dr. Beach          HPI: This patient is a 63 y.o. female whom is followed in our clinic for RAD and mild bronchiectasis  last seen by Dr. Beach on 12/8/21.  She is originally from the [facility] with a hx of TB tx for one year while living in [place]. She is a life-long non-smoker.  She does have asthma and PFTs from 2016 show proportionate reduction in FEV1 and FVC with normal ratio, normal TLC with mild air trapping and normal DLCO. She does have allergies for which she was being tx by Dr. Haines not currently following with her. She has mild bronchiectasis and calcification/scarring in RLL based on CT imaging   From 2017. Her sxs are currently well controlled on low dose ICS, symbicort 80 which she uses prn with albuterol. She overall feels well, is at her baseline, no recent illness/exacerbations.     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   Tobacco Use    Smoking status: Never     Passive exposure: Never    Smokeless tobacco: Never   Vaping Use    Vaping Use: Never used   Substance and Sexual Activity    Alcohol use: Not Currently     Alcohol/week: 0.0 oz    Drug use: No    Sexual activity: Yes     Partners: Male   Other Topics Concern    Not on file   Social History Narrative    Not on file     Social Determinants of Health     Financial Resource Strain: Not on file   Food Insecurity: Not on file   Transportation Needs: Not  on file   Physical Activity: Not on file   Stress: Not on file   Social Connections: Not on file   Intimate Partner Violence: Not on file   Housing Stability: Not on file       Family History   Problem Relation Age of Onset    Heart Disease Mother     Diabetes Mother     Heart Disease Father     Hypertension Sister     Other Sister         1 sister - kidney transplant    Diabetes Sister     Diabetes Brother         1 brother    Drug abuse Brother     Kidney Disease Brother        Current Outpatient Medications on File Prior to Visit   Medication Sig Dispense Refill    losartan (COZAAR) 50 MG Tab TAKE 1/2 TABLET BY MOUTH IN THE MORNING THEN TAKE 1 TABLET IN THE EVENING 135 Tablet 3    Omega-3 Fatty Acids (FISH OIL) 1000 MG Cap capsule Take 1,000 mg by mouth every day.      Cholecalciferol (VITAMIN D) 2000 units Cap Take 2,000 Units by mouth every day.      acetaminophen (TYLENOL) 500 MG Tab Take 1,000 mg by mouth every 8 hours as needed.      Multiple Vitamin (MULTIVITAMIN) capsule Take 1 Cap by mouth every day. 100 Cap 0    aspirin (ASA) 81 MG CHEW chewable tablet Take 1 Tab by mouth every day. 100 Tab 11    loratadine (CLARITIN) 10 MG TABS Take 10 mg by mouth every day.       No current facility-administered medications on file prior to visit.       Penicillins and Pollen extract      ROS:   Review of Systems   Constitutional:  Negative for chills, diaphoresis, fever, malaise/fatigue and weight loss.   HENT:  Negative for congestion, ear discharge, ear pain, hearing loss, nosebleeds, sinus pain, sore throat and tinnitus.    Eyes:  Negative for blurred vision, double vision, photophobia, pain, discharge and redness.   Respiratory:  Negative for cough, hemoptysis, sputum production, shortness of breath, wheezing and stridor.    Cardiovascular:  Negative for chest pain, palpitations, orthopnea, claudication, leg swelling and PND.   Gastrointestinal:  Negative for abdominal pain, constipation, diarrhea, heartburn,  "nausea and vomiting.   Genitourinary:  Negative for dysuria and urgency.   Musculoskeletal:  Negative for back pain, falls, joint pain, myalgias and neck pain.   Skin:  Negative for itching and rash.   Neurological:  Negative for dizziness, tremors, speech change, focal weakness, weakness and headaches.   Endo/Heme/Allergies:  Negative for environmental allergies.   Psychiatric/Behavioral:  Negative for depression.      /60 (BP Location: Right arm, Patient Position: Sitting, BP Cuff Size: Adult)   Pulse 86   Resp 16   Ht 1.575 m (5' 2\")   Wt 64.9 kg (143 lb)   SpO2 95%   Physical Exam  Constitutional:       General: She is not in acute distress.     Appearance: Normal appearance. She is well-developed and normal weight.   HENT:      Head: Normocephalic and atraumatic.      Right Ear: External ear normal.      Left Ear: External ear normal.      Nose: Nose normal. No congestion.      Mouth/Throat:      Mouth: Mucous membranes are moist.      Pharynx: Oropharynx is clear. No oropharyngeal exudate.   Eyes:      General: No scleral icterus.     Extraocular Movements: Extraocular movements intact.      Conjunctiva/sclera: Conjunctivae normal.      Pupils: Pupils are equal, round, and reactive to light.   Neck:      Vascular: No JVD.      Trachea: No tracheal deviation.   Cardiovascular:      Rate and Rhythm: Normal rate and regular rhythm.      Heart sounds: Normal heart sounds. No murmur heard.    No friction rub. No gallop.   Pulmonary:      Effort: Pulmonary effort is normal. No accessory muscle usage or respiratory distress.      Breath sounds: Normal breath sounds. No wheezing or rales.   Abdominal:      General: There is no distension.      Palpations: Abdomen is soft.      Tenderness: There is no abdominal tenderness.   Musculoskeletal:         General: No tenderness or deformity. Normal range of motion.      Cervical back: Normal range of motion and neck supple.      Right lower leg: No edema.      " Left lower leg: No edema.   Lymphadenopathy:      Cervical: No cervical adenopathy.   Skin:     General: Skin is warm and dry.      Findings: No rash.      Nails: There is no clubbing.   Neurological:      Mental Status: She is alert and oriented to person, place, and time.      Cranial Nerves: No cranial nerve deficit.      Gait: Gait normal.   Psychiatric:         Behavior: Behavior normal.       PFTs as reviewed by me personally: as per hPI    Imaging as reviewed by me personally:  as per hPI    Assessment:  1. Mild persistent asthma without complication  PULMONARY FUNCTION TESTS -Test requested: Complete Pulmonary Function Test    albuterol 108 (90 Base) MCG/ACT Aero Soln inhalation aerosol    SYMBICORT 80-4.5 MCG/ACT Aerosol      2. History of TB (tuberculosis)        3. Bronchiectasis without complication (HCC)  SYMBICORT 80-4.5 MCG/ACT Aerosol    DISCONTINUED: SYMBICORT 80-4.5 MCG/ACT Aerosol          Plan:  Chronic, stable with good control of sxs on low dose ICS. Will continue symbicort 80, albuterol, update PFTs prior to f/u  Reportedly completed 1 year of therapy  Very mild on CT; no exacerbations; continue supportive care  Return in about 1 year (around 4/4/2024) for PFT any time after today, then f/u in one year.

## 2023-06-01 ENCOUNTER — HOSPITAL ENCOUNTER (OUTPATIENT)
Dept: LAB | Facility: MEDICAL CENTER | Age: 63
End: 2023-06-01
Attending: FAMILY MEDICINE
Payer: COMMERCIAL

## 2023-06-01 LAB
ALBUMIN SERPL BCP-MCNC: 4.6 G/DL (ref 3.2–4.9)
ALBUMIN/GLOB SERPL: 1.6 G/DL
ALP SERPL-CCNC: 68 U/L (ref 30–99)
ALT SERPL-CCNC: 18 U/L (ref 2–50)
ANION GAP SERPL CALC-SCNC: 10 MMOL/L (ref 7–16)
AST SERPL-CCNC: 25 U/L (ref 12–45)
BILIRUB SERPL-MCNC: 0.5 MG/DL (ref 0.1–1.5)
BUN SERPL-MCNC: 14 MG/DL (ref 8–22)
CALCIUM ALBUM COR SERPL-MCNC: 9.2 MG/DL (ref 8.5–10.5)
CALCIUM SERPL-MCNC: 9.7 MG/DL (ref 8.5–10.5)
CHLORIDE SERPL-SCNC: 105 MMOL/L (ref 96–112)
CHOLEST SERPL-MCNC: 205 MG/DL (ref 100–199)
CO2 SERPL-SCNC: 26 MMOL/L (ref 20–33)
CREAT SERPL-MCNC: 0.76 MG/DL (ref 0.5–1.4)
FASTING STATUS PATIENT QL REPORTED: NORMAL
GFR SERPLBLD CREATININE-BSD FMLA CKD-EPI: 88 ML/MIN/1.73 M 2
GLOBULIN SER CALC-MCNC: 2.9 G/DL (ref 1.9–3.5)
GLUCOSE SERPL-MCNC: 84 MG/DL (ref 65–99)
HDLC SERPL-MCNC: 73 MG/DL
LDLC SERPL CALC-MCNC: 112 MG/DL
POTASSIUM SERPL-SCNC: 5 MMOL/L (ref 3.6–5.5)
PROT SERPL-MCNC: 7.5 G/DL (ref 6–8.2)
SODIUM SERPL-SCNC: 141 MMOL/L (ref 135–145)
TRIGL SERPL-MCNC: 101 MG/DL (ref 0–149)
TSH SERPL DL<=0.005 MIU/L-ACNC: 0.92 UIU/ML (ref 0.38–5.33)

## 2023-06-01 PROCEDURE — 84443 ASSAY THYROID STIM HORMONE: CPT

## 2023-06-01 PROCEDURE — 80061 LIPID PANEL: CPT

## 2023-06-01 PROCEDURE — 36415 COLL VENOUS BLD VENIPUNCTURE: CPT

## 2023-06-01 PROCEDURE — 80053 COMPREHEN METABOLIC PANEL: CPT

## 2023-09-04 ENCOUNTER — OFFICE VISIT (OUTPATIENT)
Dept: URGENT CARE | Facility: CLINIC | Age: 63
End: 2023-09-04
Payer: COMMERCIAL

## 2023-09-04 VITALS
DIASTOLIC BLOOD PRESSURE: 40 MMHG | RESPIRATION RATE: 16 BRPM | WEIGHT: 141 LBS | SYSTOLIC BLOOD PRESSURE: 100 MMHG | OXYGEN SATURATION: 97 % | HEIGHT: 61 IN | TEMPERATURE: 96.8 F | BODY MASS INDEX: 26.62 KG/M2 | HEART RATE: 96 BPM

## 2023-09-04 DIAGNOSIS — G89.29 CHRONIC LEFT SHOULDER PAIN: ICD-10-CM

## 2023-09-04 DIAGNOSIS — M25.512 CHRONIC LEFT SHOULDER PAIN: ICD-10-CM

## 2023-09-04 PROCEDURE — 3074F SYST BP LT 130 MM HG: CPT | Performed by: FAMILY MEDICINE

## 2023-09-04 PROCEDURE — 99213 OFFICE O/P EST LOW 20 MIN: CPT | Performed by: FAMILY MEDICINE

## 2023-09-04 PROCEDURE — 3078F DIAST BP <80 MM HG: CPT | Performed by: FAMILY MEDICINE

## 2023-09-04 RX ORDER — MELOXICAM 15 MG/1
15 TABLET ORAL DAILY
Qty: 30 TABLET | Refills: 0 | Status: SHIPPED | OUTPATIENT
Start: 2023-09-04

## 2023-09-04 ASSESSMENT — FIBROSIS 4 INDEX: FIB4 SCORE: 1.27

## 2023-09-04 ASSESSMENT — ENCOUNTER SYMPTOMS
MYALGIAS: 0
WEIGHT LOSS: 0

## 2023-09-04 NOTE — PROGRESS NOTES
"Subjective     Emily Sanchez is a 63 y.o. female who presents with Shoulder Pain (Left shoulder pain, O1espfn. )            PMH chronic left shoulder pain.  She was seen recently by PCP and got a new x-ray at [facility].  She reports that it notes degenerative changes.  She does have PMH calcific tendinitis as well.  Pain is moderate severity.  Worse with movement.  No new trauma.  No radiating neck pain.  No weakness.  No other aggravating alleviating factors.        Review of Systems   Constitutional:  Negative for malaise/fatigue and weight loss.   Musculoskeletal:  Negative for joint pain and myalgias.   Skin:  Negative for itching and rash.              Objective     /40 (BP Location: Left arm, Patient Position: Sitting, BP Cuff Size: Adult)   Pulse 96   Temp 36 °C (96.8 °F) (Temporal)   Resp 16   Ht 1.56 m (5' 1.42\")   Wt 64 kg (141 lb)   SpO2 97%   BMI 26.28 kg/m²      Physical Exam  Constitutional:       Appearance: Normal appearance.   Musculoskeletal:      Comments: Left shoulder: No point tenderness.  Range of motion is intact.  She has reproduction of pain with movement in all planes, greatest with internal rotation against resistance.  Distal neurovascular intact.   Neurological:      Mental Status: She is alert.                             Assessment & Plan        1. Chronic left shoulder pain    - Referral to Sports Medicine  - meloxicam (MOBIC) 15 MG tablet; Take 1 Tablet by mouth every day.  Dispense: 30 Tablet; Refill: 0  - diclofenac sodium (VOLTAREN) 1 % Gel; Apply 4 g topically 4 times a day as needed (left shoulder pain).  Dispense: 350 g; Refill: 0    Differential diagnosis, natural history, supportive care, and indications for immediate follow-up were discussed.     F/u sports medicine                "

## 2023-09-22 ENCOUNTER — OFFICE VISIT (OUTPATIENT)
Dept: SPORTS MEDICINE | Facility: CLINIC | Age: 63
End: 2023-09-22
Attending: FAMILY MEDICINE
Payer: COMMERCIAL

## 2023-09-22 VITALS
OXYGEN SATURATION: 96 % | HEART RATE: 84 BPM | SYSTOLIC BLOOD PRESSURE: 118 MMHG | RESPIRATION RATE: 18 BRPM | WEIGHT: 141 LBS | DIASTOLIC BLOOD PRESSURE: 80 MMHG | HEIGHT: 61 IN | TEMPERATURE: 98.5 F | BODY MASS INDEX: 26.62 KG/M2

## 2023-09-22 DIAGNOSIS — M75.32 CALCIFIC TENDINITIS OF LEFT SHOULDER: ICD-10-CM

## 2023-09-22 PROCEDURE — 20610 DRAIN/INJ JOINT/BURSA W/O US: CPT | Mod: LT | Performed by: FAMILY MEDICINE

## 2023-09-22 PROCEDURE — 3074F SYST BP LT 130 MM HG: CPT | Performed by: FAMILY MEDICINE

## 2023-09-22 PROCEDURE — 3079F DIAST BP 80-89 MM HG: CPT | Performed by: FAMILY MEDICINE

## 2023-09-22 RX ORDER — TRIAMCINOLONE ACETONIDE 40 MG/ML
40 INJECTION, SUSPENSION INTRA-ARTICULAR; INTRAMUSCULAR ONCE
Status: COMPLETED | OUTPATIENT
Start: 2023-09-22 | End: 2023-09-22

## 2023-09-22 RX ADMIN — TRIAMCINOLONE ACETONIDE 40 MG: 40 INJECTION, SUSPENSION INTRA-ARTICULAR; INTRAMUSCULAR at 10:58

## 2023-09-22 ASSESSMENT — FIBROSIS 4 INDEX: FIB4 SCORE: 1.27

## 2023-09-22 NOTE — PROGRESS NOTES
"Chief Complaint   Patient presents with    Shoulder Pain     L shoulder pain      CHIEF COMPLAINT:  Emily Sanchez female presenting at the request of Eduar Calvillo PA-C for evaluation of Shoulder pain.     Emily Sanchez is complaining of left shoulder pain LEFT handed  Symptoms began insidiously around early August 2023  No specific injury, but had been tooling in the yard  Woke the next day with more pain  getting worse over time  Pain is at the deltoid region  Quality is aching and can be sharp  Pain is Non-radiating  Aggravated by movements such as cleaning tables and at the end of the day as well as sleeping on the LEFT side  Improved with  Elevating the shoulder and avoiding movement/rest   She had a similar episode in 2019 and she has been doing her home program since her shoulder started hurting again  Prior Treatments: Seen at urgent care  Prior studies: X-Ray   Medications tried for pain include: mobic and voltaren help some  Mechanical Symptom history: No Locking    Retired  Gardening, walking     PHYSICAL EXAM:  /80 (BP Location: Left arm, Patient Position: Sitting, BP Cuff Size: Adult)   Pulse 84   Temp 36.9 °C (98.5 °F) (Temporal)   Resp 18   Ht 1.56 m (5' 1.42\")   Wt 64 kg (141 lb)   SpO2 96%   BMI 26.28 kg/m²      well-developed, well-nourished in no apparent distress, alert and oriented x 3.  Gait: normal    Cervical spine:  Range of motion Intact  Spurling's testing is NEGATIVE  Cervical spine tenderness NEGATIVE    Strength testing:     Deltoid, bilateral 5/5  Bicep, bilateral 5/5  Tricep, bilateral 5/5  Wrist Extension, bilateral 5/5  Wrist Flexion, bilateral 5/5  Finger Abduction, bilateral 5/5    Sensation:  INTACT Bilaterally    Reflexes:   Biceps: R 2+/L 2+  Triceps: R 2+/L  2+  Brachial radialis R 2+/L  2+  Perez's testing is NEGATIVE  The arms are otherwise neurovascularly intact     Shoulder Exam:    RIGHT Shoulder:  No visible swelling   Range of motion " INTACT  Tenderness: Non-tender  Empty Can Testing 5/5  Internal Rotation 5/5  External Rotation 5/5  Lift Off Testing 5/5  Impingement testing Chase  NEGATIVE  Neer's testing NEGATIVE  Apprehension testing NEGATIVE    LEFT Shoulder:  No visible swelling   Range of motion INTACT  Tenderness: Non-tender  Empty Can Testing 5/5  Internal Rotation 5/5  External Rotation 5/5  Lift Off Testing 5/5  Impingement testing Chase  POSITIVE  Neer's testing POSITIVE  Apprehension testing POSITIVE    Additional Findings: None    1. Calcific tendinitis of left shoulder  triamcinolone acetonide (Kenalog-40) injection 40 mg        Symptoms began insidiously around early August 2023  No specific injury, but had been tooling in the yard  Woke the next day with more pain    She had an injection back in late 2019 which HELPED    LEFT subacromial corticosteroid junction performed the office TODAY (September 22, 2023)     We offered formal physical therapy, but patient politely declined and would prefer to try home exercise program which was provided for her today    Follow-up as needed        11/19/2019 2:34 PM     HISTORY/REASON FOR EXAM:  Left shoulder pain.     TECHNIQUE/EXAM DESCRIPTION AND NUMBER OF VIEWS:  3 views of the LEFT shoulder.     COMPARISON: None     FINDINGS:  Bone mineralization is normal.  There is no evidence of fracture or dislocation.  There is degenerative change of the acromioclavicular joint.  There is calcific tendinitis of the rotator cuff. There is mild degenerative change of glenohumeral joint characterized by small osteophytes.     IMPRESSION:     1.  Mild degenerative change. No evidence of fracture.     2.  Calcific tendinitis of the rotator cuff.      Thank you Eduar Calvillo PA-C for allowing me to participate in caring for your patient.

## 2023-10-04 ENCOUNTER — NON-PROVIDER VISIT (OUTPATIENT)
Dept: SLEEP MEDICINE | Facility: MEDICAL CENTER | Age: 63
End: 2023-10-04
Attending: INTERNAL MEDICINE
Payer: COMMERCIAL

## 2023-10-04 VITALS — WEIGHT: 135 LBS | BODY MASS INDEX: 25.49 KG/M2 | HEIGHT: 61 IN

## 2023-10-04 DIAGNOSIS — J45.30 MILD PERSISTENT ASTHMA WITHOUT COMPLICATION: ICD-10-CM

## 2023-10-04 PROCEDURE — 94729 DIFFUSING CAPACITY: CPT | Mod: 26 | Performed by: INTERNAL MEDICINE

## 2023-10-04 PROCEDURE — 94010 BREATHING CAPACITY TEST: CPT | Mod: 26 | Performed by: INTERNAL MEDICINE

## 2023-10-04 PROCEDURE — 94726 PLETHYSMOGRAPHY LUNG VOLUMES: CPT | Performed by: INTERNAL MEDICINE

## 2023-10-04 PROCEDURE — 94726 PLETHYSMOGRAPHY LUNG VOLUMES: CPT | Mod: 26 | Performed by: INTERNAL MEDICINE

## 2023-10-04 PROCEDURE — 94010 BREATHING CAPACITY TEST: CPT | Performed by: INTERNAL MEDICINE

## 2023-10-04 PROCEDURE — 94729 DIFFUSING CAPACITY: CPT | Performed by: INTERNAL MEDICINE

## 2023-10-04 ASSESSMENT — PULMONARY FUNCTION TESTS
FEV1/FVC_PERCENT_LLN: 67
FEV1/FVC_PREDICTED: 80
FEV1: 1.29
FEV1/FVC_PERCENT_PREDICTED: 98
FEV1_LLN: 1.65
FEV1/FVC_PERCENT_PREDICTED: 96
FEV1/FVC: 78
FVC_PERCENT_PREDICTED: 66
FEV1/FVC_PERCENT_PREDICTED: 80
FVC_LLN: 2.06
FVC_PREDICTED: 2.47
FEV1_PERCENT_PREDICTED: 65
FVC: 1.65
FEV1_PREDICTED: 1.98
FEV1/FVC: 78

## 2023-10-04 ASSESSMENT — FIBROSIS 4 INDEX: FIB4 SCORE: 1.27

## 2023-10-04 NOTE — PROCEDURES
Technician: Angélica Foss RRT, CPFT  Good patient effort & cooperation. Patient took Albuterol 1.5 hr prior to testing, NO POST FVC. The results of this test meet the ATS/ERS standards for acceptability & reproducibility.  Test was performed on the [employer] Body Plethysmograph-Elite DX system.  Predicted equations for Spirometry are GLI-2012, ITS for lung volumes, and GLI-2017 for DLCO.  The DLCO was uncorrected for Hgb.  DLCO performed during dilation period.    Interpretation;    Baseline spirometry shows proportionate reduction in FVC at 1.65 L or 66% predicted and FEV1 at 1.29 L or 65% predicted with a normal FEV1/FVC ratio 78.  Bronchodilator testing was not performed.  Total lung capacity is reduced at 3.57 L or 78% predicted.  Diffusion capacity is normal at 109% predicted.  Pulmonary function testing suggest restriction based on reduction in FVC and TLC however DLCO is within normal limits.  Correlate clinically and with imaging.

## 2024-02-11 ENCOUNTER — OFFICE VISIT (OUTPATIENT)
Dept: URGENT CARE | Facility: CLINIC | Age: 64
End: 2024-02-11
Payer: COMMERCIAL

## 2024-02-11 VITALS
SYSTOLIC BLOOD PRESSURE: 160 MMHG | RESPIRATION RATE: 20 BRPM | HEART RATE: 95 BPM | DIASTOLIC BLOOD PRESSURE: 70 MMHG | OXYGEN SATURATION: 95 % | TEMPERATURE: 98.8 F | HEIGHT: 62 IN | WEIGHT: 135 LBS | BODY MASS INDEX: 24.84 KG/M2

## 2024-02-11 DIAGNOSIS — U07.1 COVID-19: ICD-10-CM

## 2024-02-11 PROCEDURE — 3078F DIAST BP <80 MM HG: CPT | Performed by: PHYSICIAN ASSISTANT

## 2024-02-11 PROCEDURE — 99213 OFFICE O/P EST LOW 20 MIN: CPT | Performed by: PHYSICIAN ASSISTANT

## 2024-02-11 PROCEDURE — 3077F SYST BP >= 140 MM HG: CPT | Performed by: PHYSICIAN ASSISTANT

## 2024-02-11 ASSESSMENT — ENCOUNTER SYMPTOMS
MYALGIAS: 1
VOMITING: 0
NAUSEA: 0
SORE THROAT: 1
COUGH: 1
EYE DISCHARGE: 0
SHORTNESS OF BREATH: 1
HEADACHES: 1
FEVER: 1
EYE REDNESS: 0
DIARRHEA: 0

## 2024-02-11 ASSESSMENT — FIBROSIS 4 INDEX: FIB4 SCORE: 1.29

## 2024-02-11 NOTE — PROGRESS NOTES
Subjective     Emily Sanchez is a 64 y.o. female who presents with Coronavirus Screening (Covid positive )            URI   This is a new problem. Episode onset: x 2 days ago. The problem has been unchanged. Maximum temperature: The patient reports a subjective fever. Associated symptoms include congestion, coughing, headaches and a sore throat. Pertinent negatives include no chest pain, diarrhea, ear pain, nausea or vomiting. She has tried inhaler use and acetaminophen (and OTC Coricidin HBP) for the symptoms.     The patient states she recently returned from vacationing in [place].  The patient states her  is sick with similar symptoms.  The patient states she took an at-home COVID test this morning, which was positive.    The patient's past medical history is significant for asthma and COPD.      PMH:  has a past medical history of Bronchiectasis (HCC), Bronchitis, Environmental allergies (7/3/2013), Hepatitis B carrier (HCC), History of pneumothorax, Hypertension, Pneumonia, Positive TB test, Tuberculosis, and Tuberculosis exposure.  MEDS:   Current Outpatient Medications:     meloxicam (MOBIC) 15 MG tablet, Take 1 Tablet by mouth every day., Disp: 30 Tablet, Rfl: 0    diclofenac sodium (VOLTAREN) 1 % Gel, Apply 4 g topically 4 times a day as needed (left shoulder pain)., Disp: 350 g, Rfl: 0    albuterol 108 (90 Base) MCG/ACT Aero Soln inhalation aerosol, Inhale 2 Puffs every 6 hours as needed for Shortness of Breath., Disp: 3 Each, Rfl: 4    Omega-3 Fatty Acids (FISH OIL) 1000 MG Cap capsule, Take 1,000 mg by mouth every day., Disp: , Rfl:     Cholecalciferol (VITAMIN D) 2000 units Cap, Take 2,000 Units by mouth every day., Disp: , Rfl:     acetaminophen (TYLENOL) 500 MG Tab, Take 1,000 mg by mouth every 8 hours as needed., Disp: , Rfl:     Multiple Vitamin (MULTIVITAMIN) capsule, Take 1 Cap by mouth every day., Disp: 100 Cap, Rfl: 0    aspirin (ASA) 81 MG CHEW chewable tablet, Take 1 Tab by  "mouth every day., Disp: 100 Tab, Rfl: 11    loratadine (CLARITIN) 10 MG TABS, Take 10 mg by mouth every day., Disp: , Rfl:     SYMBICORT 80-4.5 MCG/ACT Aerosol, INHALE 1 TO 2 PUFFS BY MOUTH 2 TIMES A DAY. RINSE MOUTH WITH WATER AFTER EACH USE, Disp: 3 Each, Rfl: 3    losartan (COZAAR) 50 MG Tab, TAKE 1/2 TABLET BY MOUTH IN THE MORNING THEN TAKE 1 TABLET IN THE EVENING, Disp: 135 Tablet, Rfl: 3  ALLERGIES:   Allergies   Allergen Reactions    Penicillins Rash     On body     Pollen Extract Runny Nose     Runny Nose     SURGHX:   Past Surgical History:   Procedure Laterality Date    CATARACT EXTRACTION WITH IOL      LIVER BIOPSY      TUBAL LIGATION       SOCHX:  reports that she has never smoked. She has never been exposed to tobacco smoke. She has never used smokeless tobacco. She reports that she does not currently use alcohol. She reports that she does not use drugs.  FH: Family history was reviewed, no pertinent findings to report      Review of Systems   Constitutional:  Positive for fever.   HENT:  Positive for congestion and sore throat. Negative for ear pain.    Eyes:  Negative for discharge and redness.   Respiratory:  Positive for cough and shortness of breath (The patient reports intermittent shortness of breath, which she attributes to her underlying asthma.  The patient states she has been using her albuterol inhaler as needed with improvement of her symptoms.).    Cardiovascular:  Negative for chest pain.   Gastrointestinal:  Negative for diarrhea, nausea and vomiting.   Musculoskeletal:  Positive for myalgias.   Neurological:  Positive for headaches.              Objective     BP (!) 160/70 (BP Location: Left arm, Patient Position: Sitting, BP Cuff Size: Adult)   Pulse 95   Temp 37.1 °C (98.8 °F) (Temporal)   Resp 20   Ht 1.575 m (5' 2\")   Wt 61.2 kg (135 lb)   SpO2 95%   BMI 24.69 kg/m²      Physical Exam  Constitutional:       General: She is not in acute distress.     Appearance: Normal " appearance. She is not ill-appearing.   HENT:      Head: Normocephalic and atraumatic.      Right Ear: Tympanic membrane, ear canal and external ear normal.      Left Ear: Tympanic membrane, ear canal and external ear normal.      Nose: Nose normal.      Mouth/Throat:      Mouth: Mucous membranes are moist.      Pharynx: Oropharynx is clear. No posterior oropharyngeal erythema.   Eyes:      Extraocular Movements: Extraocular movements intact.      Conjunctiva/sclera: Conjunctivae normal.   Cardiovascular:      Rate and Rhythm: Normal rate and regular rhythm.      Heart sounds: Normal heart sounds.   Pulmonary:      Effort: Pulmonary effort is normal. No respiratory distress.      Breath sounds: Wheezing (slight diffuse expiratory wheezing) present.   Musculoskeletal:         General: Normal range of motion.      Cervical back: Normal range of motion and neck supple.   Skin:     General: Skin is warm and dry.   Neurological:      Mental Status: She is alert and oriented to person, place, and time.                             Assessment & Plan          1. COVID-19  - Nirmatrelvir&Ritonavir 300/100 20 x 150 MG & 10 x 100MG Tablet Therapy Pack; Take 300 mg nirmatrelvir (two 150 mg tablets) with 100 mg ritonavir (one 100 mg tablet) by mouth, with all three tablets taken together twice daily for 5 days.  Dispense: 30 Each; Refill: 0    The patient's presenting symptoms and physical exam findings are consistent with COVID-19.  On physical exam, the patient had slight diffuse expiratory wheezing without rhonchi.  The patient's pulse ox was within normal limits.  The remainder the patient's physical exam today in clinic was normal.  The patient is nontoxic and appears in no acute distress.  The patient's vital signs are stable and within normal limits, with the exception of a mildly elevated blood pressure.  The patient reports a history of hypertension, and admits that she has not taken her blood pressure medication yet  today.  This is likely contributing to the patient's elevated blood pressure.  She is afebrile today in clinic.  The patient states she took an at-home COVID-19 test earlier today, which was positive.  The patient's current illness is consistent with COVID-19.  The patient is eligible for treatment with Paxlovid.  Advised the patient of the associated risks and benefits of taking Paxlovid.  The patient would like to proceed with Paxlovid treatment.  The patient reports no history of renal dysfunction.  In reviewing the patient's most recent GFR was within normal limits.  Will prescribe the patient Paxlovid for her acute COVID-19 illness.  Advised the patient to stay at home under self quarantine per CDC guidelines.  Recommend OTC medications and supportive care for symptomatic management.  Recommend the patient follow-up with primary care as needed.  Discussed strict return precautions with the patient, and she verbalized understanding.    Differential diagnoses, supportive care, and indications for immediate follow-up discussed with patient.   Instructed to return to clinic or nearest emergency department for any change in condition, further concerns, or worsening of symptoms.    OTC Tylenol or Motrin for fever/discomfort.  OTC cough/cold medication for symptomatic relief  OTC Supportive Care for Congestion - saline nasal spray or neti pot  Drink plenty of fluids  Follow-up with PCP  Return to clinic or go to the ED if symptoms worsen or fail to improve, or if the patient should develop worsening/increasing cough, congestion, ear pain, sore throat, shortness of breath, wheezing, chest pain, fever/chills, and/or any concerning symptoms.    Discussed plan with the patient, and she agrees to the above.     I personally reviewed prior external notes and test results pertinent to today's visit.  I have independently reviewed and interpreted all diagnostics ordered during this urgent care visit.     Please note that this  dictation was created using voice recognition software. I have made every reasonable attempt to correct obvious errors, but I expect that there may be errors of grammar and possibly content that I did not discover before finalizing the note.       This note was electronically signed by Josie Stark PA-C

## 2024-04-11 ENCOUNTER — HOSPITAL ENCOUNTER (OUTPATIENT)
Dept: LAB | Facility: MEDICAL CENTER | Age: 64
End: 2024-04-11
Attending: FAMILY MEDICINE
Payer: COMMERCIAL

## 2024-04-11 LAB
ALBUMIN SERPL BCP-MCNC: 4.7 G/DL (ref 3.2–4.9)
ALBUMIN/GLOB SERPL: 1.7 G/DL
ALP SERPL-CCNC: 68 U/L (ref 30–99)
ALT SERPL-CCNC: 26 U/L (ref 2–50)
ANION GAP SERPL CALC-SCNC: 12 MMOL/L (ref 7–16)
AST SERPL-CCNC: 27 U/L (ref 12–45)
BASOPHILS # BLD AUTO: 1.3 % (ref 0–1.8)
BASOPHILS # BLD: 0.09 K/UL (ref 0–0.12)
BILIRUB SERPL-MCNC: 0.4 MG/DL (ref 0.1–1.5)
BUN SERPL-MCNC: 20 MG/DL (ref 8–22)
CALCIUM ALBUM COR SERPL-MCNC: 8.5 MG/DL (ref 8.5–10.5)
CALCIUM SERPL-MCNC: 9.1 MG/DL (ref 8.5–10.5)
CHLORIDE SERPL-SCNC: 104 MMOL/L (ref 96–112)
CHOLEST SERPL-MCNC: 206 MG/DL (ref 100–199)
CO2 SERPL-SCNC: 24 MMOL/L (ref 20–33)
CREAT SERPL-MCNC: 0.71 MG/DL (ref 0.5–1.4)
EOSINOPHIL # BLD AUTO: 0.67 K/UL (ref 0–0.51)
EOSINOPHIL NFR BLD: 9.4 % (ref 0–6.9)
ERYTHROCYTE [DISTWIDTH] IN BLOOD BY AUTOMATED COUNT: 40.1 FL (ref 35.9–50)
EST. AVERAGE GLUCOSE BLD GHB EST-MCNC: 105 MG/DL
FASTING STATUS PATIENT QL REPORTED: NORMAL
GFR SERPLBLD CREATININE-BSD FMLA CKD-EPI: 95 ML/MIN/1.73 M 2
GLOBULIN SER CALC-MCNC: 2.7 G/DL (ref 1.9–3.5)
GLUCOSE SERPL-MCNC: 86 MG/DL (ref 65–99)
HBA1C MFR BLD: 5.3 % (ref 4–5.6)
HCT VFR BLD AUTO: 43.7 % (ref 37–47)
HDLC SERPL-MCNC: 79 MG/DL
HGB BLD-MCNC: 14.4 G/DL (ref 12–16)
IMM GRANULOCYTES # BLD AUTO: 0.01 K/UL (ref 0–0.11)
IMM GRANULOCYTES NFR BLD AUTO: 0.1 % (ref 0–0.9)
LDLC SERPL CALC-MCNC: 106 MG/DL
LYMPHOCYTES # BLD AUTO: 2.67 K/UL (ref 1–4.8)
LYMPHOCYTES NFR BLD: 37.4 % (ref 22–41)
MCH RBC QN AUTO: 30.3 PG (ref 27–33)
MCHC RBC AUTO-ENTMCNC: 33 G/DL (ref 32.2–35.5)
MCV RBC AUTO: 92 FL (ref 81.4–97.8)
MONOCYTES # BLD AUTO: 0.44 K/UL (ref 0–0.85)
MONOCYTES NFR BLD AUTO: 6.2 % (ref 0–13.4)
NEUTROPHILS # BLD AUTO: 3.25 K/UL (ref 1.82–7.42)
NEUTROPHILS NFR BLD: 45.6 % (ref 44–72)
NRBC # BLD AUTO: 0 K/UL
NRBC BLD-RTO: 0 /100 WBC (ref 0–0.2)
PLATELET # BLD AUTO: 283 K/UL (ref 164–446)
PMV BLD AUTO: 9.8 FL (ref 9–12.9)
POTASSIUM SERPL-SCNC: 4 MMOL/L (ref 3.6–5.5)
PROT SERPL-MCNC: 7.4 G/DL (ref 6–8.2)
RBC # BLD AUTO: 4.75 M/UL (ref 4.2–5.4)
SODIUM SERPL-SCNC: 140 MMOL/L (ref 135–145)
TRIGL SERPL-MCNC: 104 MG/DL (ref 0–149)
WBC # BLD AUTO: 7.1 K/UL (ref 4.8–10.8)

## 2024-04-11 PROCEDURE — 85025 COMPLETE CBC W/AUTO DIFF WBC: CPT

## 2024-04-11 PROCEDURE — 80053 COMPREHEN METABOLIC PANEL: CPT

## 2024-04-11 PROCEDURE — 83036 HEMOGLOBIN GLYCOSYLATED A1C: CPT

## 2024-04-11 PROCEDURE — 80061 LIPID PANEL: CPT

## 2024-04-11 PROCEDURE — 36415 COLL VENOUS BLD VENIPUNCTURE: CPT

## 2024-04-15 ENCOUNTER — OFFICE VISIT (OUTPATIENT)
Dept: SLEEP MEDICINE | Facility: MEDICAL CENTER | Age: 64
End: 2024-04-15
Attending: INTERNAL MEDICINE
Payer: COMMERCIAL

## 2024-04-15 VITALS
HEART RATE: 82 BPM | DIASTOLIC BLOOD PRESSURE: 60 MMHG | WEIGHT: 140 LBS | HEIGHT: 62 IN | BODY MASS INDEX: 25.76 KG/M2 | OXYGEN SATURATION: 99 % | SYSTOLIC BLOOD PRESSURE: 104 MMHG

## 2024-04-15 DIAGNOSIS — J45.30 MILD PERSISTENT ASTHMA WITHOUT COMPLICATION: ICD-10-CM

## 2024-04-15 DIAGNOSIS — Z86.11 HISTORY OF TB (TUBERCULOSIS): ICD-10-CM

## 2024-04-15 DIAGNOSIS — J47.9 BRONCHIECTASIS WITHOUT COMPLICATION (HCC): ICD-10-CM

## 2024-04-15 PROCEDURE — 99211 OFF/OP EST MAY X REQ PHY/QHP: CPT | Performed by: INTERNAL MEDICINE

## 2024-04-15 PROCEDURE — 99214 OFFICE O/P EST MOD 30 MIN: CPT | Performed by: INTERNAL MEDICINE

## 2024-04-15 PROCEDURE — 3078F DIAST BP <80 MM HG: CPT | Performed by: INTERNAL MEDICINE

## 2024-04-15 PROCEDURE — 3074F SYST BP LT 130 MM HG: CPT | Performed by: INTERNAL MEDICINE

## 2024-04-15 RX ORDER — BUDESONIDE AND FORMOTEROL FUMARATE DIHYDRATE 80; 4.5 UG/1; UG/1
2 AEROSOL RESPIRATORY (INHALATION) 2 TIMES DAILY
COMMUNITY

## 2024-04-15 ASSESSMENT — ENCOUNTER SYMPTOMS
WEIGHT LOSS: 0
PND: 0
EYE PAIN: 0
NECK PAIN: 0
SHORTNESS OF BREATH: 0
CLAUDICATION: 0
DEPRESSION: 0
ORTHOPNEA: 0
FEVER: 0
HEARTBURN: 0
PHOTOPHOBIA: 0
ABDOMINAL PAIN: 0
DIAPHORESIS: 0
EYE DISCHARGE: 0
MYALGIAS: 0
CHILLS: 0
VOMITING: 0
SORE THROAT: 0
NAUSEA: 0
FALLS: 0
SINUS PAIN: 0
DOUBLE VISION: 0
PALPITATIONS: 0
CONSTIPATION: 0
DIZZINESS: 0
SPUTUM PRODUCTION: 0
BACK PAIN: 0
COUGH: 0
DIARRHEA: 0
BLURRED VISION: 0
HEMOPTYSIS: 0
SPEECH CHANGE: 0
TREMORS: 0
WHEEZING: 0
STRIDOR: 0
HEADACHES: 0
WEAKNESS: 0
FOCAL WEAKNESS: 0
EYE REDNESS: 0

## 2024-04-15 ASSESSMENT — FIBROSIS 4 INDEX: FIB4 SCORE: 1.2

## 2024-04-15 ASSESSMENT — PATIENT HEALTH QUESTIONNAIRE - PHQ9: CLINICAL INTERPRETATION OF PHQ2 SCORE: 0

## 2024-04-15 NOTE — PROGRESS NOTES
Chief Complaint   Patient presents with    Follow-Up     LAST SEEN 4/4/23    Results     PFT 10/4/23         HPI: This patient is a 64 y.o. female whom is followed in our clinic for asthma last seen by me on 4/4/23.  She is originally from the [facility] with a hx of TB tx for one year while living in [place]. She is a life-long non-smoker. She does have asthma and PFTs from 2016 show proportionate reduction in FEV1 and FVC with normal ratio, normal TLC with mild air trapping and normal DLCO. She does have allergies for which she was being tx by Dr. Haines not currently following with her.  Updated PFT from 10/2023 showed mild reduction in TLC at 78% pred but stable air flows and DLCO. She has mild bronchiectasis and calcification/scarring in RLL based on CT imaging from 2017. Her sxs are currently well controlled on low dose ICS, symbicort 80 which she uses prn with albuterol.  No exacerbations in the last year. She suffered from a mild case of Covid in February after taking a month long cruise. Sxs were mild. No acute concerns today.    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   Tobacco Use    Smoking status: Never     Passive exposure: Never    Smokeless tobacco: Never   Vaping Use    Vaping Use: Never used   Substance and Sexual Activity    Alcohol use: Not Currently     Alcohol/week: 0.0 oz    Drug use: No    Sexual activity: Yes     Partners: Male   Other Topics Concern    Not on file   Social  History Narrative    Not on file     Social Determinants of Health     Financial Resource Strain: Not on file   Food Insecurity: Not on file   Transportation Needs: Not on file   Physical Activity: Not on file   Stress: Not on file   Social Connections: Not on file   Intimate Partner Violence: Not on file   Housing Stability: Not on file       Family History   Problem Relation Age of Onset    Heart Disease Mother     Diabetes Mother     Heart Disease Father     Hypertension Sister     Other Sister         1 sister - kidney transplant    Diabetes Sister     Diabetes Brother         1 brother    Drug abuse Brother     Kidney Disease Brother        Current Outpatient Medications on File Prior to Visit   Medication Sig Dispense Refill    budesonide-formoterol (SYMBICORT) 80-4.5 MCG/ACT Aerosol Inhale 2 Puffs 2 times a day.      albuterol 108 (90 Base) MCG/ACT Aero Soln inhalation aerosol Inhale 2 Puffs every 6 hours as needed for Shortness of Breath. 3 Each 4    losartan (COZAAR) 50 MG Tab TAKE 1/2 TABLET BY MOUTH IN THE MORNING THEN TAKE 1 TABLET IN THE EVENING 135 Tablet 3    Nirmatrelvir&Ritonavir 300/100 20 x 150 MG & 10 x 100MG Tablet Therapy Pack Take 300 mg nirmatrelvir (two 150 mg tablets) with 100 mg ritonavir (one 100 mg tablet) by mouth, with all three tablets taken together twice daily for 5 days. 30 Each 0    meloxicam (MOBIC) 15 MG tablet Take 1 Tablet by mouth every day. 30 Tablet 0    diclofenac sodium (VOLTAREN) 1 % Gel Apply 4 g topically 4 times a day as needed (left shoulder pain). 350 g 0    SYMBICORT 80-4.5 MCG/ACT Aerosol INHALE 1 TO 2 PUFFS BY MOUTH 2 TIMES A DAY. RINSE MOUTH WITH WATER AFTER EACH USE 3 Each 3    Omega-3 Fatty Acids (FISH OIL) 1000 MG Cap capsule Take 1,000 mg by mouth every day.      Cholecalciferol (VITAMIN D) 2000 units Cap Take 2,000 Units by mouth every day.      acetaminophen (TYLENOL) 500 MG Tab Take 1,000 mg by mouth every 8 hours as needed.      Multiple Vitamin  "(MULTIVITAMIN) capsule Take 1 Cap by mouth every day. 100 Cap 0    aspirin (ASA) 81 MG CHEW chewable tablet Take 1 Tab by mouth every day. 100 Tab 11    loratadine (CLARITIN) 10 MG TABS Take 10 mg by mouth every day.       No current facility-administered medications on file prior to visit.       Penicillins and Pollen extract      ROS:   Review of Systems   Constitutional:  Negative for chills, diaphoresis, fever, malaise/fatigue and weight loss.   HENT:  Negative for congestion, ear discharge, ear pain, hearing loss, nosebleeds, sinus pain, sore throat and tinnitus.    Eyes:  Negative for blurred vision, double vision, photophobia, pain, discharge and redness.   Respiratory:  Negative for cough, hemoptysis, sputum production, shortness of breath, wheezing and stridor.    Cardiovascular:  Negative for chest pain, palpitations, orthopnea, claudication, leg swelling and PND.   Gastrointestinal:  Negative for abdominal pain, constipation, diarrhea, heartburn, nausea and vomiting.   Genitourinary:  Negative for dysuria and urgency.   Musculoskeletal:  Negative for back pain, falls, joint pain, myalgias and neck pain.   Skin:  Negative for itching and rash.   Neurological:  Negative for dizziness, tremors, speech change, focal weakness, weakness and headaches.   Endo/Heme/Allergies:  Negative for environmental allergies.   Psychiatric/Behavioral:  Negative for depression.        /60 (BP Location: Right arm, Patient Position: Sitting, BP Cuff Size: Adult)   Pulse 82   Ht 1.575 m (5' 2\")   Wt 63.5 kg (140 lb)   SpO2 99%   Physical Exam  Constitutional:       General: She is not in acute distress.     Appearance: Normal appearance. She is well-developed and normal weight.   HENT:      Head: Normocephalic and atraumatic.      Right Ear: External ear normal.      Left Ear: External ear normal.      Nose: Nose normal. No congestion.      Mouth/Throat:      Mouth: Mucous membranes are moist.      Pharynx: Oropharynx " is clear. No oropharyngeal exudate.   Eyes:      General: No scleral icterus.     Extraocular Movements: Extraocular movements intact.      Conjunctiva/sclera: Conjunctivae normal.      Pupils: Pupils are equal, round, and reactive to light.   Neck:      Vascular: No JVD.      Trachea: No tracheal deviation.   Cardiovascular:      Rate and Rhythm: Normal rate and regular rhythm.      Heart sounds: Normal heart sounds. No murmur heard.     No friction rub. No gallop.   Pulmonary:      Effort: Pulmonary effort is normal. No accessory muscle usage or respiratory distress.      Breath sounds: Normal breath sounds. No wheezing or rales.   Abdominal:      General: There is no distension.      Palpations: Abdomen is soft.      Tenderness: There is no abdominal tenderness.   Musculoskeletal:         General: No tenderness or deformity. Normal range of motion.      Cervical back: Normal range of motion and neck supple.      Right lower leg: No edema.      Left lower leg: No edema.   Lymphadenopathy:      Cervical: No cervical adenopathy.   Skin:     General: Skin is warm and dry.      Findings: No rash.      Nails: There is no clubbing.   Neurological:      Mental Status: She is alert and oriented to person, place, and time.      Cranial Nerves: No cranial nerve deficit.      Gait: Gait normal.   Psychiatric:         Behavior: Behavior normal.       PFTs as reviewed by me personally: as per hPI    Imaging as reviewed by me personally:  as per HPI    Assessment:  1. Mild persistent asthma without complication        2. Bronchiectasis without complication (HCC)        3. History of TB (tuberculosis)            Plan:  Chronic, mild, well controlled. Consider tapering to prn ICS next visit.   Chronic. Never with chronic sxs. Likely sequelae of TB.   Pt with some radiographci sequelae and mildly abnormal PFT but overall normal function. Continue observation.  Return in about 1 year (around 4/15/2025) for asthma.

## 2024-06-04 ENCOUNTER — OFFICE VISIT (OUTPATIENT)
Dept: URGENT CARE | Facility: CLINIC | Age: 64
End: 2024-06-04
Payer: COMMERCIAL

## 2024-06-04 VITALS
DIASTOLIC BLOOD PRESSURE: 80 MMHG | HEIGHT: 62 IN | WEIGHT: 139 LBS | HEART RATE: 81 BPM | OXYGEN SATURATION: 96 % | TEMPERATURE: 97.3 F | RESPIRATION RATE: 16 BRPM | BODY MASS INDEX: 25.58 KG/M2 | SYSTOLIC BLOOD PRESSURE: 132 MMHG

## 2024-06-04 DIAGNOSIS — M25.512 CHRONIC LEFT SHOULDER PAIN: ICD-10-CM

## 2024-06-04 DIAGNOSIS — J44.1 COPD EXACERBATION (HCC): ICD-10-CM

## 2024-06-04 DIAGNOSIS — J32.9 RHINOSINUSITIS: ICD-10-CM

## 2024-06-04 DIAGNOSIS — G89.29 CHRONIC LEFT SHOULDER PAIN: ICD-10-CM

## 2024-06-04 PROCEDURE — 3075F SYST BP GE 130 - 139MM HG: CPT | Performed by: FAMILY MEDICINE

## 2024-06-04 PROCEDURE — 99214 OFFICE O/P EST MOD 30 MIN: CPT | Performed by: FAMILY MEDICINE

## 2024-06-04 PROCEDURE — 3079F DIAST BP 80-89 MM HG: CPT | Performed by: FAMILY MEDICINE

## 2024-06-04 RX ORDER — BENZONATATE 200 MG/1
200 CAPSULE ORAL 3 TIMES DAILY PRN
Qty: 30 CAPSULE | Refills: 0 | Status: SHIPPED | OUTPATIENT
Start: 2024-06-04

## 2024-06-04 RX ORDER — DOXYCYCLINE HYCLATE 100 MG
100 TABLET ORAL 2 TIMES DAILY
Qty: 14 TABLET | Refills: 0 | Status: SHIPPED | OUTPATIENT
Start: 2024-06-04 | End: 2024-06-04

## 2024-06-04 RX ORDER — AZITHROMYCIN 250 MG/1
TABLET, FILM COATED ORAL
Qty: 6 TABLET | Refills: 0 | Status: SHIPPED | OUTPATIENT
Start: 2024-06-04

## 2024-06-04 ASSESSMENT — FIBROSIS 4 INDEX: FIB4 SCORE: 1.2

## 2024-06-04 ASSESSMENT — ENCOUNTER SYMPTOMS
WEIGHT LOSS: 0
VOMITING: 0
NAUSEA: 0
MYALGIAS: 0
EYE DISCHARGE: 0
EYE REDNESS: 0

## 2024-06-04 NOTE — PROGRESS NOTES
"Subjective     Emily Sanchez is a 64 y.o. female who presents with Cough (Mucous, congestion x 1 week, h/x of COPD and Asthma)            1 week productive cough without blood in sputum.  Sinus pressure and nasal congestion.  Intermittent shortness of breath and wheeze.  PMH COPD/asthma. Initial fever resolved.  No limb swelling.  No other aggravating or alleviating factors.    Chronic left shoulder pain. Has done well with voltaren gel and would like a refill.         Review of Systems   Constitutional:  Negative for malaise/fatigue and weight loss.   Eyes:  Negative for discharge and redness.   Cardiovascular:  Negative for leg swelling.   Gastrointestinal:  Negative for nausea and vomiting.   Musculoskeletal:  Negative for joint pain and myalgias.   Skin:  Negative for itching and rash.              Objective     /80 (BP Location: Left arm, Patient Position: Sitting, BP Cuff Size: Adult)   Pulse 81   Temp 36.3 °C (97.3 °F) (Temporal)   Resp 16   Ht 1.575 m (5' 2\")   Wt 63 kg (139 lb)   SpO2 96%   BMI 25.42 kg/m²      Physical Exam  Constitutional:       General: She is not in acute distress.     Appearance: She is well-developed.   HENT:      Head: Normocephalic and atraumatic.      Nose: Congestion present.      Mouth/Throat:      Comments: PND  Eyes:      Conjunctiva/sclera: Conjunctivae normal.   Cardiovascular:      Rate and Rhythm: Normal rate and regular rhythm.      Heart sounds: Normal heart sounds. No murmur heard.  Pulmonary:      Effort: Pulmonary effort is normal.      Breath sounds: Wheezing present.   Musculoskeletal:         General: Normal range of motion.   Skin:     General: Skin is warm and dry.      Findings: No rash.   Neurological:      Mental Status: She is alert.                             Assessment & Plan        1. Rhinosinusitis        2. COPD exacerbation (HCC)  azithromycin (ZITHROMAX) 250 MG Tab    benzonatate (TESSALON) 200 MG capsule    DISCONTINUED: " doxycycline (VIBRAMYCIN) 100 MG Tab      3. Chronic left shoulder pain  diclofenac sodium (VOLTAREN) 1 % Gel        Nasal saline. Nasal CS.     Differential diagnosis, natural history, supportive care, and indications for immediate follow-up were discussed.

## 2024-07-11 ENCOUNTER — TELEPHONE (OUTPATIENT)
Dept: CARDIOLOGY | Facility: MEDICAL CENTER | Age: 64
End: 2024-07-11
Payer: COMMERCIAL

## 2024-07-19 ENCOUNTER — APPOINTMENT (OUTPATIENT)
Dept: CARDIOLOGY | Facility: MEDICAL CENTER | Age: 64
End: 2024-07-19
Attending: INTERNAL MEDICINE
Payer: COMMERCIAL

## 2024-07-25 ENCOUNTER — HOSPITAL ENCOUNTER (OUTPATIENT)
Dept: RADIOLOGY | Facility: MEDICAL CENTER | Age: 64
End: 2024-07-25
Attending: OBSTETRICS & GYNECOLOGY
Payer: COMMERCIAL

## 2024-07-25 DIAGNOSIS — Z12.31 ENCOUNTER FOR SCREENING MAMMOGRAM FOR MALIGNANT NEOPLASM OF BREAST: ICD-10-CM

## 2024-07-25 PROCEDURE — 77063 BREAST TOMOSYNTHESIS BI: CPT

## 2024-08-21 DIAGNOSIS — J45.30 MILD PERSISTENT ASTHMA WITHOUT COMPLICATION: ICD-10-CM

## 2024-08-21 RX ORDER — ALBUTEROL SULFATE 90 UG/1
2 AEROSOL, METERED RESPIRATORY (INHALATION) EVERY 6 HOURS PRN
Qty: 3 EACH | Refills: 4 | Status: SHIPPED | OUTPATIENT
Start: 2024-08-21

## 2024-08-21 NOTE — TELEPHONE ENCOUNTER
Have we ever prescribed this med? Yes.  If yes, what date? 4/4/23    Last OV: 4/15/24    Next OV: 10/30/24      Medications: Albuterol HFA.

## 2024-09-03 ENCOUNTER — TELEPHONE (OUTPATIENT)
Dept: CARDIOLOGY | Facility: MEDICAL CENTER | Age: 64
End: 2024-09-03
Payer: COMMERCIAL

## 2024-09-03 NOTE — TELEPHONE ENCOUNTER
Called pt in regards to NP upcoming appointment. Pt confirms this will be their first time seeing a cardiologist. PCP Dr. Mcgarry possibly has a recent EKG. Records have been requested from [facility]. Fax confirmation has been received and sent to scan through [facility].     Reminded pt of appointment time and date.

## 2024-09-04 ENCOUNTER — OFFICE VISIT (OUTPATIENT)
Dept: CARDIOLOGY | Facility: MEDICAL CENTER | Age: 64
End: 2024-09-04
Attending: INTERNAL MEDICINE
Payer: COMMERCIAL

## 2024-09-04 VITALS
HEART RATE: 93 BPM | HEIGHT: 62 IN | SYSTOLIC BLOOD PRESSURE: 126 MMHG | RESPIRATION RATE: 16 BRPM | DIASTOLIC BLOOD PRESSURE: 70 MMHG | WEIGHT: 145 LBS | OXYGEN SATURATION: 96 % | BODY MASS INDEX: 26.68 KG/M2

## 2024-09-04 DIAGNOSIS — E78.5 DYSLIPIDEMIA: ICD-10-CM

## 2024-09-04 DIAGNOSIS — R94.31 NONSPECIFIC ABNORMAL ELECTROCARDIOGRAM (ECG) (EKG): ICD-10-CM

## 2024-09-04 DIAGNOSIS — R07.89 OTHER CHEST PAIN: ICD-10-CM

## 2024-09-04 LAB — EKG IMPRESSION: NORMAL

## 2024-09-04 PROCEDURE — 99213 OFFICE O/P EST LOW 20 MIN: CPT | Performed by: INTERNAL MEDICINE

## 2024-09-04 PROCEDURE — 3074F SYST BP LT 130 MM HG: CPT | Performed by: INTERNAL MEDICINE

## 2024-09-04 PROCEDURE — 99204 OFFICE O/P NEW MOD 45 MIN: CPT | Mod: 25 | Performed by: INTERNAL MEDICINE

## 2024-09-04 PROCEDURE — 3078F DIAST BP <80 MM HG: CPT | Performed by: INTERNAL MEDICINE

## 2024-09-04 PROCEDURE — 93005 ELECTROCARDIOGRAM TRACING: CPT | Performed by: INTERNAL MEDICINE

## 2024-09-04 PROCEDURE — 93010 ELECTROCARDIOGRAM REPORT: CPT | Performed by: INTERNAL MEDICINE

## 2024-09-04 RX ORDER — ATORVASTATIN CALCIUM 20 MG/1
40 TABLET, FILM COATED ORAL DAILY
Qty: 90 TABLET | Refills: 3 | Status: SHIPPED | OUTPATIENT
Start: 2024-09-04

## 2024-09-04 ASSESSMENT — FIBROSIS 4 INDEX: FIB4 SCORE: 1.2

## 2024-09-04 NOTE — PROGRESS NOTES
Interventional cardiology Initial Consultation Note      Chief Complaint: Establish care, chest pains, abnormal EKG is    Emily Sanchez is a 64 y.o. female  patient presented today with several complaints, she complains of chest pain that last for few seconds, she also complains of dizziness when she stands up from bending over.  She also had an episode of syncope 2 years ago when she was under significant stress.  When she is working in the yard if it is hot sometimes she gets tunnel vision.  Her mom and dad both has coronary artery disease.        Past Medical History:   Diagnosis Date    Bronchiectasis (HCC)     Bronchitis     Environmental allergies 7/3/2013    Hepatitis B carrier (HCC)     History of pneumothorax     Hypertension     Pneumonia     Positive TB test     treated 1990's    Tuberculosis     Tuberculosis exposure              Current Outpatient Medications   Medication Sig Dispense Refill    atorvastatin (LIPITOR) 20 MG Tab Take 2 Tablets by mouth every day. 90 Tablet 3    albuterol 108 (90 Base) MCG/ACT Aero Soln inhalation aerosol Inhale 2 Puffs every 6 hours as needed for Shortness of Breath. 3 Each 4    budesonide-formoterol (SYMBICORT) 80-4.5 MCG/ACT Aerosol Inhale 2 Puffs 2 times a day.      SYMBICORT 80-4.5 MCG/ACT Aerosol INHALE 1 TO 2 PUFFS BY MOUTH 2 TIMES A DAY. RINSE MOUTH WITH WATER AFTER EACH USE 3 Each 3    losartan (COZAAR) 50 MG Tab TAKE 1/2 TABLET BY MOUTH IN THE MORNING THEN TAKE 1 TABLET IN THE EVENING 135 Tablet 3    Cholecalciferol (VITAMIN D) 2000 units Cap Take 2,000 Units by mouth every day.      acetaminophen (TYLENOL) 500 MG Tab Take 1,000 mg by mouth every 8 hours as needed.      Multiple Vitamin (MULTIVITAMIN) capsule Take 1 Cap by mouth every day. 100 Cap 0    aspirin (ASA) 81 MG CHEW chewable tablet Take 1 Tab by mouth every day. 100 Tab 11    loratadine (CLARITIN) 10 MG TABS Take 10 mg by mouth every day.      diclofenac sodium (VOLTAREN) 1 % Gel Apply  "4 g topically 4 times a day as needed (left shoulder pain). (Patient not taking: Reported on 9/4/2024) 350 g 0    azithromycin (ZITHROMAX) 250 MG Tab 2 PO day #1 then 1 PO day #2-5 (Patient not taking: Reported on 9/4/2024) 6 Tablet 0    benzonatate (TESSALON) 200 MG capsule Take 1 Capsule by mouth 3 times a day as needed for Cough. (Patient not taking: Reported on 9/4/2024) 30 Capsule 0    Nirmatrelvir&Ritonavir 300/100 20 x 150 MG & 10 x 100MG Tablet Therapy Pack Take 300 mg nirmatrelvir (two 150 mg tablets) with 100 mg ritonavir (one 100 mg tablet) by mouth, with all three tablets taken together twice daily for 5 days. (Patient not taking: Reported on 9/4/2024) 30 Each 0     No current facility-administered medications for this visit.             Physical Exam:  Ambulatory Vitals  /70 (BP Location: Left arm, Patient Position: Sitting, BP Cuff Size: Adult)   Pulse 93   Resp 16   Ht 1.575 m (5' 2\")   Wt 65.8 kg (145 lb)   SpO2 96%    Oxygen Therapy:  Pulse Oximetry: 96 %  BP Readings from Last 4 Encounters:   09/04/24 126/70   06/04/24 132/80   04/15/24 104/60   02/11/24 (!) 160/70       Weight/BMI: Body mass index is 26.52 kg/m².  Wt Readings from Last 4 Encounters:   09/04/24 65.8 kg (145 lb)   06/04/24 63 kg (139 lb)   04/15/24 63.5 kg (140 lb)   02/11/24 61.2 kg (135 lb)           General: Well appearing and in no apparent distress  Neck: carotid bruits absent  Lungs: respiratory sounds  normal  Heart: Regular rhythm,  No palpable thrills on palpation, murmurs absent, no rubs,   Lower extremity edema absent.     EKG sinus rhythm      Medical Decision Making:  Problem List Items Addressed This Visit    None  Visit Diagnoses       Nonspecific abnormal electrocardiogram (ECG) (EKG)        Relevant Orders    EKG (Completed)    Dyslipidemia        Relevant Medications    atorvastatin (LIPITOR) 20 MG Tab    Other chest pain        Relevant Orders    EC-ECHOCARDIOGRAM REST/STRESS W/O CONT(DOES NOT INCLUDE A " FULL RESTING ECHO)          Her chest pains are very atypical, however given her family history, she is at intermediate risk given her age, family history, hypertension.  Recommend stress echocardiogram to rule out coronary artery disease.  Also recommend intermediate dose statin to reduce LDL less than 70.  Regarding her spells of tunnel vision, syncope, I think mostly from dehydration.  Counseled on adequate water intake.  We discussed cardiac healthy diet.  We discussed regular exercise.    If stress echocardiogram is negative, no need to follow-up on a regular basis.    This note was dictated using Dragon speech recognition software.    Donald DALAL  Interventional cardiologist  [facility]  [facility], [address]  [address] [address]  Phone: [phone]  Fax: [phone]

## 2024-09-09 NOTE — TELEPHONE ENCOUNTER
Correspondence has been received and scanned into media.     [facility] states to not have any records for patient.

## 2024-09-12 ENCOUNTER — APPOINTMENT (OUTPATIENT)
Dept: CARDIOLOGY | Facility: MEDICAL CENTER | Age: 64
End: 2024-09-12
Attending: INTERNAL MEDICINE
Payer: COMMERCIAL

## 2024-10-17 ENCOUNTER — HOSPITAL ENCOUNTER (OUTPATIENT)
Dept: CARDIOLOGY | Facility: MEDICAL CENTER | Age: 64
End: 2024-10-17
Attending: INTERNAL MEDICINE
Payer: COMMERCIAL

## 2024-10-17 DIAGNOSIS — R07.89 OTHER CHEST PAIN: ICD-10-CM

## 2024-10-17 PROCEDURE — 93017 CV STRESS TEST TRACING ONLY: CPT

## 2024-10-22 ENCOUNTER — TELEPHONE (OUTPATIENT)
Dept: CARDIOLOGY | Facility: MEDICAL CENTER | Age: 64
End: 2024-10-22

## 2024-10-22 DIAGNOSIS — I49.3 PVC'S (PREMATURE VENTRICULAR CONTRACTIONS): ICD-10-CM

## 2024-10-22 DIAGNOSIS — I49.3 SYMPTOMATIC PVCS: ICD-10-CM

## 2024-10-22 LAB — LV EJECT FRACT  99904: 60

## 2024-10-22 PROCEDURE — 93350 STRESS TTE ONLY: CPT | Mod: 26 | Performed by: INTERNAL MEDICINE

## 2024-10-22 PROCEDURE — 93018 CV STRESS TEST I&R ONLY: CPT | Performed by: INTERNAL MEDICINE

## 2024-10-28 ENCOUNTER — PATIENT MESSAGE (OUTPATIENT)
Dept: SLEEP MEDICINE | Facility: MEDICAL CENTER | Age: 64
End: 2024-10-28
Payer: COMMERCIAL

## 2024-10-28 DIAGNOSIS — J45.30 MILD PERSISTENT ASTHMA WITHOUT COMPLICATION: ICD-10-CM

## 2024-10-29 RX ORDER — METOPROLOL SUCCINATE 25 MG/1
25 TABLET, EXTENDED RELEASE ORAL EVERY EVENING
Qty: 90 TABLET | Refills: 3 | Status: SHIPPED | OUTPATIENT
Start: 2024-10-29

## 2024-10-30 ENCOUNTER — OFFICE VISIT (OUTPATIENT)
Dept: SLEEP MEDICINE | Facility: MEDICAL CENTER | Age: 64
End: 2024-10-30
Payer: COMMERCIAL

## 2024-10-30 VITALS
DIASTOLIC BLOOD PRESSURE: 72 MMHG | HEIGHT: 61 IN | OXYGEN SATURATION: 94 % | SYSTOLIC BLOOD PRESSURE: 124 MMHG | WEIGHT: 143 LBS | BODY MASS INDEX: 27 KG/M2 | HEART RATE: 69 BPM

## 2024-10-30 DIAGNOSIS — J45.30 MILD PERSISTENT ASTHMA WITHOUT COMPLICATION: ICD-10-CM

## 2024-10-30 DIAGNOSIS — J47.9 BRONCHIECTASIS WITHOUT COMPLICATION (HCC): ICD-10-CM

## 2024-10-30 PROCEDURE — 99212 OFFICE O/P EST SF 10 MIN: CPT

## 2024-10-30 RX ORDER — LEVALBUTEROL TARTRATE 45 UG/1
2 AEROSOL, METERED ORAL EVERY 4 HOURS PRN
Qty: 15 G | Refills: 11 | Status: SHIPPED | OUTPATIENT
Start: 2024-10-30

## 2024-10-30 RX ORDER — BECLOMETHASONE DIPROPIONATE HFA 80 UG/1
2 AEROSOL, METERED RESPIRATORY (INHALATION) 2 TIMES DAILY
Qty: 31.8 G | Refills: 3 | Status: SHIPPED | OUTPATIENT
Start: 2024-10-30

## 2024-10-30 ASSESSMENT — ENCOUNTER SYMPTOMS
SPUTUM PRODUCTION: 0
WHEEZING: 0
DIARRHEA: 0
DIZZINESS: 0
DIAPHORESIS: 0
NAUSEA: 0
HEADACHES: 0
PALPITATIONS: 0
CHILLS: 0
COUGH: 0
SINUS PAIN: 0
FEVER: 0
SHORTNESS OF BREATH: 1
MYALGIAS: 0
WEAKNESS: 0
HEARTBURN: 0
VOMITING: 0
HEMOPTYSIS: 0
FALLS: 0

## 2024-10-30 ASSESSMENT — FIBROSIS 4 INDEX: FIB4 SCORE: 1.2

## 2024-11-04 RX ORDER — FLUTICASONE PROPIONATE 220 UG/1
2 AEROSOL, METERED RESPIRATORY (INHALATION) 2 TIMES DAILY
Qty: 36 G | Refills: 3 | Status: SHIPPED | OUTPATIENT
Start: 2024-11-04

## 2025-02-26 DIAGNOSIS — E78.5 DYSLIPIDEMIA: ICD-10-CM

## 2025-02-26 RX ORDER — ATORVASTATIN CALCIUM 20 MG/1
40 TABLET, FILM COATED ORAL DAILY
Qty: 90 TABLET | Refills: 0 | OUTPATIENT
Start: 2025-02-26

## 2025-03-11 DIAGNOSIS — E78.5 DYSLIPIDEMIA: ICD-10-CM

## 2025-03-11 RX ORDER — ATORVASTATIN CALCIUM 20 MG/1
40 TABLET, FILM COATED ORAL DAILY
Qty: 180 TABLET | Refills: 1 | Status: SHIPPED | OUTPATIENT
Start: 2025-03-11

## 2025-04-22 ENCOUNTER — HOSPITAL ENCOUNTER (OUTPATIENT)
Dept: LAB | Facility: MEDICAL CENTER | Age: 65
End: 2025-04-22
Attending: FAMILY MEDICINE
Payer: MEDICARE

## 2025-04-22 LAB
ALBUMIN SERPL BCP-MCNC: 4.3 G/DL (ref 3.2–4.9)
ALBUMIN/GLOB SERPL: 1.4 G/DL
ALP SERPL-CCNC: 69 U/L (ref 30–99)
ALT SERPL-CCNC: 30 U/L (ref 2–50)
ANION GAP SERPL CALC-SCNC: 10 MMOL/L (ref 7–16)
AST SERPL-CCNC: 29 U/L (ref 12–45)
BILIRUB SERPL-MCNC: 0.4 MG/DL (ref 0.1–1.5)
BUN SERPL-MCNC: 16 MG/DL (ref 8–22)
CALCIUM ALBUM COR SERPL-MCNC: 9.1 MG/DL (ref 8.5–10.5)
CALCIUM SERPL-MCNC: 9.3 MG/DL (ref 8.5–10.5)
CHLORIDE SERPL-SCNC: 105 MMOL/L (ref 96–112)
CHOLEST SERPL-MCNC: 151 MG/DL (ref 100–199)
CO2 SERPL-SCNC: 24 MMOL/L (ref 20–33)
CREAT SERPL-MCNC: 0.77 MG/DL (ref 0.5–1.4)
EST. AVERAGE GLUCOSE BLD GHB EST-MCNC: 120 MG/DL
FASTING STATUS PATIENT QL REPORTED: NORMAL
GFR SERPLBLD CREATININE-BSD FMLA CKD-EPI: 85 ML/MIN/1.73 M 2
GLOBULIN SER CALC-MCNC: 3.1 G/DL (ref 1.9–3.5)
GLUCOSE SERPL-MCNC: 92 MG/DL (ref 65–99)
HBA1C MFR BLD: 5.8 % (ref 4–5.6)
HDLC SERPL-MCNC: 92 MG/DL
LDLC SERPL CALC-MCNC: 50 MG/DL
POTASSIUM SERPL-SCNC: 4.3 MMOL/L (ref 3.6–5.5)
PROT SERPL-MCNC: 7.4 G/DL (ref 6–8.2)
SODIUM SERPL-SCNC: 139 MMOL/L (ref 135–145)
TRIGL SERPL-MCNC: 44 MG/DL (ref 0–149)

## 2025-04-22 PROCEDURE — 83036 HEMOGLOBIN GLYCOSYLATED A1C: CPT | Mod: GA

## 2025-04-22 PROCEDURE — 80061 LIPID PANEL: CPT

## 2025-04-22 PROCEDURE — 36415 COLL VENOUS BLD VENIPUNCTURE: CPT

## 2025-04-22 PROCEDURE — 80053 COMPREHEN METABOLIC PANEL: CPT

## 2025-06-27 ENCOUNTER — OFFICE VISIT (OUTPATIENT)
Dept: URGENT CARE | Facility: CLINIC | Age: 65
End: 2025-06-27
Payer: MEDICARE

## 2025-06-27 VITALS
SYSTOLIC BLOOD PRESSURE: 116 MMHG | HEIGHT: 62 IN | BODY MASS INDEX: 26.68 KG/M2 | OXYGEN SATURATION: 96 % | RESPIRATION RATE: 18 BRPM | TEMPERATURE: 97 F | WEIGHT: 145 LBS | DIASTOLIC BLOOD PRESSURE: 72 MMHG | HEART RATE: 86 BPM

## 2025-06-27 DIAGNOSIS — R21 RASH: Primary | ICD-10-CM

## 2025-06-27 DIAGNOSIS — L20.82 FLEXURAL ECZEMA: ICD-10-CM

## 2025-06-27 DIAGNOSIS — L50.9 URTICARIA: ICD-10-CM

## 2025-06-27 PROCEDURE — 99214 OFFICE O/P EST MOD 30 MIN: CPT

## 2025-06-27 PROCEDURE — 3074F SYST BP LT 130 MM HG: CPT

## 2025-06-27 PROCEDURE — 3078F DIAST BP <80 MM HG: CPT

## 2025-06-27 RX ORDER — GABAPENTIN 300 MG/1
CAPSULE ORAL
COMMUNITY
Start: 2025-06-23

## 2025-06-27 RX ORDER — HYDROCORTISONE CREAM 1% 10 MG/G
1 CREAM TOPICAL 2 TIMES DAILY
Qty: 15 G | Refills: 0 | Status: SHIPPED | OUTPATIENT
Start: 2025-06-27 | End: 2025-07-02

## 2025-06-27 ASSESSMENT — ENCOUNTER SYMPTOMS
EYE DISCHARGE: 0
DIARRHEA: 0
FEVER: 0
BLOOD IN STOOL: 0
BRUISES/BLEEDS EASILY: 0
EYE REDNESS: 0
COUGH: 0
WHEEZING: 0
CONSTIPATION: 0
VOMITING: 0

## 2025-06-27 ASSESSMENT — FIBROSIS 4 INDEX: FIB4 SCORE: 1.22

## 2025-06-27 NOTE — PROGRESS NOTES
Subjective:     Emily Sanchez is a 65 y.o. female who presents for Rash (X2days, left elbow rash, itchy, painful)      Rash  This is a new problem. The current episode started yesterday. Pertinent negatives include no congestion, cough, diarrhea, fever or vomiting.       Review of Systems   Constitutional:  Negative for fever.   HENT:  Negative for congestion and ear discharge.    Eyes:  Negative for discharge and redness.   Respiratory:  Negative for cough and wheezing.    Gastrointestinal:  Negative for blood in stool, constipation, diarrhea and vomiting.   Genitourinary:  Negative for frequency and hematuria.   Skin:  Positive for itching and rash.   Endo/Heme/Allergies:  Does not bruise/bleed easily.        CURRENT MEDICATIONS:  acetaminophen Tabs  albuterol Aers  aspirin Chew  atorvastatin Tabs  azithromycin Tabs  benzonatate  diclofenac sodium Gel  fluticasone Aero  levalbuterol  loratadine Tabs  losartan Tabs  metoprolol SR Tb24  multivitamin  Nirmatrelvir&Ritonavir 300/100 Tbpk  Qvar RediHaler Aerb  Vitamin D Caps    Allergies:   Allergies[1]    Current Problems: Emily Sanchez does not have any pertinent problems on file.  Past Surgical Hx:    Past Surgical History:   Procedure Laterality Date    CATARACT EXTRACTION WITH IOL      LIVER BIOPSY      TUBAL LIGATION        Past Social Hx:  reports that she has never smoked. She has never been exposed to tobacco smoke. She has never used smokeless tobacco. She reports that she does not currently use alcohol. She reports that she does not use drugs.   Past Family Hx:  Emily Sanchez family history includes Diabetes in her brother, mother, and sister; Drug abuse in her brother; Heart Disease in her father and mother; Hypertension in her sister; Kidney Disease in her brother; Other in her sister.     (Allergies, Medications, & Tobacco/Substance Use were reconciled by the Medical Assistant and reviewed by myself. The family  "history is prepopulated)       Objective:     /72 (BP Location: Left arm, Patient Position: Sitting, BP Cuff Size: Adult)   Pulse 86   Temp 36.1 °C (97 °F) (Temporal)   Resp 18   Ht 1.575 m (5' 2\")   Wt 65.8 kg (145 lb)   SpO2 96%   BMI 26.52 kg/m²     Physical Exam  Constitutional:       General: She is not in acute distress.     Appearance: Normal appearance. She is not ill-appearing, toxic-appearing or diaphoretic.   HENT:      Head: Normocephalic and atraumatic.      Nose: Nose normal.   Eyes:      General: No scleral icterus.        Right eye: No discharge.         Left eye: No discharge.   Cardiovascular:      Rate and Rhythm: Normal rate and regular rhythm.      Heart sounds: Normal heart sounds. No murmur heard.     No friction rub. No gallop.   Pulmonary:      Effort: Pulmonary effort is normal. No respiratory distress.      Breath sounds: No stridor. No wheezing, rhonchi or rales.   Chest:      Chest wall: No tenderness.   Abdominal:      General: Abdomen is flat.      Palpations: Abdomen is soft.   Musculoskeletal:         General: Normal range of motion.      Cervical back: No rigidity.   Skin:     General: Skin is warm and dry.      Findings: Rash present. Rash is papular, purpuric and urticarial.          Neurological:      General: No focal deficit present.      Mental Status: She is alert and oriented to person, place, and time. Mental status is at baseline.   Psychiatric:         Behavior: Behavior normal.         Judgment: Judgment normal.         Assessment/Plan:     Emily Chaparro was seen today for rash.    Diagnoses and all orders for this visit:    Rash  -     Hydrocortisone Acetate 1 % Cream; Apply 1 Film topically 2 times a day for 5 days.    Flexural eczema  -     Hydrocortisone Acetate 1 % Cream; Apply 1 Film topically 2 times a day for 5 days.    Urticaria     Based on history of presenting illness, review of systems and physical exam findings, most likely etiology of rash is " flexural eczema. discussed symptomatic management with pharmacotherapy and over-the-counter medications.  On my exam I do not see any signs or symptoms consistent with a bacterial infection currently requiring oral antibiotics.  Discussed the risks the benefits and the indications of all new medications prescribed today.  Strict return and ED precautions were discussed and all questions were answered.     Differential diagnosis, natural history, supportive care, and indications for immediate follow-up discussed.    Advised the patient to follow-up with the primary care physician for recheck, reevaluation, and consideration of further management.    Please note that this dictation was created using voice recognition software. I have made reasonable attempt to correct obvious errors, but I expect that there are errors of grammar and possibly content that I did not discover before finalizing the note.    This note was electronically signed by Ami Carrera MD PhD         [1]   Allergies  Allergen Reactions    Penicillins Rash     On body     Pollen Extract Runny Nose     Runny Nose

## 2025-07-28 ENCOUNTER — HOSPITAL ENCOUNTER (OUTPATIENT)
Dept: RADIOLOGY | Facility: MEDICAL CENTER | Age: 65
End: 2025-07-28
Attending: OBSTETRICS & GYNECOLOGY
Payer: MEDICARE

## 2025-07-28 ENCOUNTER — HOSPITAL ENCOUNTER (OUTPATIENT)
Dept: RADIOLOGY | Facility: MEDICAL CENTER | Age: 65
End: 2025-07-28
Attending: FAMILY MEDICINE
Payer: MEDICARE

## 2025-07-28 DIAGNOSIS — M81.0 AGE-RELATED OSTEOPOROSIS WITHOUT CURRENT PATHOLOGICAL FRACTURE: ICD-10-CM

## 2025-07-28 DIAGNOSIS — Z12.31 VISIT FOR SCREENING MAMMOGRAM: ICD-10-CM

## 2025-07-28 PROCEDURE — 77063 BREAST TOMOSYNTHESIS BI: CPT

## 2025-07-28 PROCEDURE — 77080 DXA BONE DENSITY AXIAL: CPT
